# Patient Record
Sex: FEMALE | Race: WHITE | NOT HISPANIC OR LATINO | Employment: FULL TIME | URBAN - METROPOLITAN AREA
[De-identification: names, ages, dates, MRNs, and addresses within clinical notes are randomized per-mention and may not be internally consistent; named-entity substitution may affect disease eponyms.]

---

## 2017-03-10 ENCOUNTER — HOSPITAL ENCOUNTER (EMERGENCY)
Facility: MEDICAL CENTER | Age: 22
End: 2017-03-10
Attending: EMERGENCY MEDICINE
Payer: COMMERCIAL

## 2017-03-10 ENCOUNTER — APPOINTMENT (OUTPATIENT)
Dept: RADIOLOGY | Facility: MEDICAL CENTER | Age: 22
End: 2017-03-10
Attending: EMERGENCY MEDICINE
Payer: COMMERCIAL

## 2017-03-10 VITALS
SYSTOLIC BLOOD PRESSURE: 142 MMHG | DIASTOLIC BLOOD PRESSURE: 71 MMHG | HEART RATE: 120 BPM | BODY MASS INDEX: 30.33 KG/M2 | WEIGHT: 204.81 LBS | HEIGHT: 69 IN | RESPIRATION RATE: 20 BRPM | OXYGEN SATURATION: 96 % | TEMPERATURE: 98.7 F

## 2017-03-10 DIAGNOSIS — E05.90 HYPERTHYROIDISM: ICD-10-CM

## 2017-03-10 DIAGNOSIS — Z3A.01 6 WEEKS GESTATION OF PREGNANCY: ICD-10-CM

## 2017-03-10 DIAGNOSIS — N93.9 VAGINAL BLEEDING: ICD-10-CM

## 2017-03-10 LAB
APPEARANCE UR: ABNORMAL
B-HCG SERPL-ACNC: ABNORMAL MIU/ML (ref 0–10)
BACTERIA GENITAL QL WET PREP: NORMAL
BASOPHILS # BLD AUTO: 0.3 % (ref 0–1.8)
BASOPHILS # BLD: 0.02 K/UL (ref 0–0.12)
COLOR UR: ABNORMAL
EOSINOPHIL # BLD AUTO: 0.06 K/UL (ref 0–0.51)
EOSINOPHIL NFR BLD: 0.9 % (ref 0–6.9)
ERYTHROCYTE [DISTWIDTH] IN BLOOD BY AUTOMATED COUNT: 38.5 FL (ref 35.9–50)
GLUCOSE UR STRIP.AUTO-MCNC: NEGATIVE MG/DL
HCG UR QL: POSITIVE
HCT VFR BLD AUTO: 40.7 % (ref 37–47)
HGB BLD-MCNC: 13.4 G/DL (ref 12–16)
IMM GRANULOCYTES # BLD AUTO: 0.02 K/UL (ref 0–0.11)
IMM GRANULOCYTES NFR BLD AUTO: 0.3 % (ref 0–0.9)
KETONES UR STRIP.AUTO-MCNC: NEGATIVE MG/DL
LEUKOCYTE ESTERASE UR QL STRIP.AUTO: ABNORMAL
LYMPHOCYTES # BLD AUTO: 2.54 K/UL (ref 1–4.8)
LYMPHOCYTES NFR BLD: 37.6 % (ref 22–41)
MCH RBC QN AUTO: 25.6 PG (ref 27–33)
MCHC RBC AUTO-ENTMCNC: 32.9 G/DL (ref 33.6–35)
MCV RBC AUTO: 77.7 FL (ref 81.4–97.8)
MONOCYTES # BLD AUTO: 0.81 K/UL (ref 0–0.85)
MONOCYTES NFR BLD AUTO: 12 % (ref 0–13.4)
NEUTROPHILS # BLD AUTO: 3.3 K/UL (ref 2–7.15)
NEUTROPHILS NFR BLD: 48.9 % (ref 44–72)
NITRITE UR QL STRIP.AUTO: NEGATIVE
NRBC # BLD AUTO: 0 K/UL
NRBC BLD AUTO-RTO: 0 /100 WBC
NUMBER OF RH DOSES IND 8505RD: NORMAL
PH UR STRIP.AUTO: 5.5 [PH]
PLATELET # BLD AUTO: 186 K/UL (ref 164–446)
PMV BLD AUTO: 10.7 FL (ref 9–12.9)
PROT UR QL STRIP: NEGATIVE MG/DL
RBC # BLD AUTO: 5.24 M/UL (ref 4.2–5.4)
RBC UR QL AUTO: ABNORMAL
RH BLD: NORMAL
SIGNIFICANT IND 70042: NORMAL
SITE SITE: NORMAL
SOURCE SOURCE: NORMAL
SP GR UR STRIP.AUTO: 1.01
T4 FREE SERPL-MCNC: 2.83 NG/DL (ref 0.58–1.64)
TSH SERPL DL<=0.005 MIU/L-ACNC: <0.015 UIU/ML (ref 0.35–5.5)
WBC # BLD AUTO: 6.8 K/UL (ref 4.8–10.8)

## 2017-03-10 PROCEDURE — 87491 CHLMYD TRACH DNA AMP PROBE: CPT

## 2017-03-10 PROCEDURE — 86901 BLOOD TYPING SEROLOGIC RH(D): CPT

## 2017-03-10 PROCEDURE — 84702 CHORIONIC GONADOTROPIN TEST: CPT

## 2017-03-10 PROCEDURE — 85025 COMPLETE CBC W/AUTO DIFF WBC: CPT

## 2017-03-10 PROCEDURE — 76817 TRANSVAGINAL US OBSTETRIC: CPT

## 2017-03-10 PROCEDURE — 87591 N.GONORRHOEAE DNA AMP PROB: CPT

## 2017-03-10 PROCEDURE — 84439 ASSAY OF FREE THYROXINE: CPT

## 2017-03-10 PROCEDURE — 36415 COLL VENOUS BLD VENIPUNCTURE: CPT

## 2017-03-10 PROCEDURE — 81025 URINE PREGNANCY TEST: CPT

## 2017-03-10 PROCEDURE — 99284 EMERGENCY DEPT VISIT MOD MDM: CPT

## 2017-03-10 PROCEDURE — 81002 URINALYSIS NONAUTO W/O SCOPE: CPT

## 2017-03-10 PROCEDURE — 84443 ASSAY THYROID STIM HORMONE: CPT

## 2017-03-10 RX ORDER — PROPYLTHIOURACIL 50 MG/1
50 TABLET ORAL 3 TIMES DAILY
Qty: 90 TAB | Refills: 1 | Status: SHIPPED | OUTPATIENT
Start: 2017-03-10 | End: 2018-02-12

## 2017-03-10 RX ORDER — LORATADINE 10 MG/1
10 TABLET ORAL DAILY
Status: SHIPPED | COMMUNITY
End: 2018-02-12

## 2017-03-10 ASSESSMENT — PAIN SCALES - GENERAL: PAINLEVEL_OUTOF10: ASSUMED PAIN PRESENT

## 2017-03-10 NOTE — ED NOTES
The Medication Reconciliation process has been completed by interviewing the patient    Allergies have been reviewed  Antibiotic use in 30 days - NONE    Home Pharmacy:  [facility] [place]

## 2017-03-10 NOTE — ED AVS SNAPSHOT
deskwolf Access Code: [email or web address]  Expires: 4/9/2017  3:34 PM    deskwolf  A secure, online tool to manage your health information     [employer]s deskwolf® is a secure, online tool that connects you to your personalized health information from the privacy of your home -- day or night - making it very easy for you to manage your healthcare. Once the activation process is completed, you can even access your medical information using the deskwolf karla, which is available for free in the Apple Karla store or Google Play store.     deskwolf provides the following levels of access (as shown below):   My Chart Features   [facility] Primary Care Doctor [facility]  Specialists [facility]  Urgent  Care Non-[facility]  Primary Care  Doctor   Email your healthcare team securely and privately 24/7 X X X X   Manage appointments: schedule your next appointment; view details of past/upcoming appointments X      Request prescription refills. X      View recent personal medical records, including lab and immunizations X X X X   View health record, including health history, allergies, medications X X X X   Read reports about your outpatient visits, procedures, consult and ER notes X X X X   See your discharge summary, which is a recap of your hospital and/or ER visit that includes your diagnosis, lab results, and care plan. X X       How to register for deskwolf:  1. Go to  [email or web address].  2. Click on the Sign Up Now box, which takes you to the New Member Sign Up page. You will need to provide the following information:  a. Enter your deskwolf Access Code exactly as it appears at the top of this page. (You will not need to use this code after you’ve completed the sign-up process. If you do not sign up before the expiration date, you must request a new code.)   b. Enter your date of birth.   c. Enter your home email address.   d. Click Submit, and follow the next screen’s instructions.  3. Create a deskwolf ID. This will be your deskwolf  login ID and cannot be changed, so think of one that is secure and easy to remember.  4. Create a [employer] password. You can change your password at any time.  5. Enter your Password Reset Question and Answer. This can be used at a later time if you forget your password.   6. Enter your e-mail address. This allows you to receive e-mail notifications when new information is available in [employer].  7. Click Sign Up. You can now view your health information.    For assistance activating your [employer] account, call [phone]

## 2017-03-10 NOTE — DISCHARGE INSTRUCTIONS
Take PTU as prescribed for your thyroid  Go to [facility] on [address] for continued vaginal bleeding, pain, or other concerns  Follow-up with Dr. Vale who was on-call for us for family practice and can refer you to an endocrinologist  Follow-up with GYN as scheduled on March 23      Hyperthyroidism  Hyperthyroidism is when the thyroid is too active (overactive). Your thyroid is a large gland that is located in your neck. The thyroid helps to control how your body uses food (metabolism). When your thyroid is overactive, it produces too much of a hormone called thyroxine.   CAUSES  Causes of hyperthyroidism may include:  · Graves disease. This is when your immune system attacks the thyroid gland. This is the most common cause.  · Inflammation of the thyroid gland.  · Tumor in the thyroid gland or somewhere else.  · Excessive use of thyroid medicines, including:  · Prescription thyroid supplement.  · Herbal supplements that mimic thyroid hormones.  · Solid or fluid-filled lumps within your thyroid gland (thyroid nodules).  · Excessive ingestion of iodine.  RISK FACTORS  · Being female.  · Having a family history of thyroid conditions.  SIGNS AND SYMPTOMS  Signs and symptoms of hyperthyroidism may include:  · Nervousness.  · Inability to tolerate heat.  · Unexplained weight loss.  · Diarrhea.  · Change in the texture of hair or skin.  · Heart skipping beats or making extra beats.  · Rapid heart rate.  · Loss of menstruation.  · Shaky hands.  · Fatigue.  · Restlessness.  · Increased appetite.  · Sleep problems.  · Enlarged thyroid gland or nodules.  DIAGNOSIS   Diagnosis of hyperthyroidism may include:  · Medical history and physical exam.  · Blood tests.  · Ultrasound tests.  TREATMENT  Treatment may include:  · Medicines to control your thyroid.  · Surgery to remove your thyroid.  · Radiation therapy.  HOME CARE INSTRUCTIONS   · Take medicines only as directed by your health care provider.  · Do  not use any tobacco products, including cigarettes, chewing tobacco, or electronic cigarettes. If you need help quitting, ask your health care provider.  · Do not exercise or do physical activity until your health care provider approves.  · Keep all follow-up appointments as directed by your health care provider. This is important.  SEEK MEDICAL CARE IF:  · Your symptoms do not get better with treatment.  · You have fever.  · You are taking thyroid replacement medicine and you:  ¨ Have depression.  ¨ Feel mentally and physically slow.  ¨ Have weight gain.  SEEK IMMEDIATE MEDICAL CARE IF:   · You have decreased alertness or a change in your awareness.  · You have abdominal pain.  · You feel dizzy.  · You have a rapid heartbeat.  · You have an irregular heartbeat.     This information is not intended to replace advice given to you by your health care provider. Make sure you discuss any questions you have with your health care provider.     Document Released: 12/18/2006 Document Revised: 01/08/2016 Document Reviewed: 05/05/2015  [employer] Interactive Patient Education ©2016 [employer]      Vaginal Bleeding During Pregnancy, First Trimester  A small amount of bleeding (spotting) from the vagina is relatively common in early pregnancy. It usually stops on its own. Various things may cause bleeding or spotting in early pregnancy. Some bleeding may be related to the pregnancy, and some may not. In most cases, the bleeding is normal and is not a problem. However, bleeding can also be a sign of something serious. Be sure to tell your health care provider about any vaginal bleeding right away.  Some possible causes of vaginal bleeding during the first trimester include:  · Infection or inflammation of the cervix.  · Growths (polyps) on the cervix.  · Miscarriage or threatened miscarriage.  · Pregnancy tissue has developed outside of the uterus and in a fallopian tube (tubal pregnancy).  · Tiny cysts have developed in the  uterus instead of pregnancy tissue (molar pregnancy).  HOME CARE INSTRUCTIONS   Watch your condition for any changes. The following actions may help to lessen any discomfort you are feeling:  · Follow your health care provider's instructions for limiting your activity. If your health care provider orders bed rest, you may need to stay in bed and only get up to use the bathroom. However, your health care provider may allow you to continue light activity.  · If needed, make plans for someone to help with your regular activities and responsibilities while you are on bed rest.  · Keep track of the number of pads you use each day, how often you change pads, and how soaked (saturated) they are. Write this down.  · Do not use tampons. Do not douche.  · Do not have sexual intercourse or orgasms until approved by your health care provider.  · If you pass any tissue from your vagina, save the tissue so you can show it to your health care provider.  · Only take over-the-counter or prescription medicines as directed by your health care provider.  · Do not take aspirin because it can make you bleed.  · Keep all follow-up appointments as directed by your health care provider.  SEEK MEDICAL CARE IF:  · You have any vaginal bleeding during any part of your pregnancy.  · You have cramps or labor pains.  · You have a fever, not controlled by medicine.  SEEK IMMEDIATE MEDICAL CARE IF:   · You have severe cramps in your back or belly (abdomen).  · You pass large clots or tissue from your vagina.  · Your bleeding increases.  · You feel light-headed or weak, or you have fainting episodes.  · You have chills.  · You are leaking fluid or have a gush of fluid from your vagina.  · You pass out while having a bowel movement.  MAKE SURE YOU:  · Understand these instructions.  · Will watch your condition.  · Will get help right away if you are not doing well or get worse.     This information is not intended to replace advice given to you by  your health care provider. Make sure you discuss any questions you have with your health care provider.     Document Released: 09/27/2006 Document Revised: 12/23/2014 Document Reviewed: 08/25/2014  Elsevier Interactive Patient Education ©2016 Elsevier Inc.

## 2017-03-10 NOTE — ED AVS SNAPSHOT
Home Care Instructions                                                                                                                Damari Hansen   MRN: [id number]    Department:  [facility], Emergency Dept   Date of Visit:  3/10/2017            [facility], Emergency Dept    [address]    [place] [address]    Phone:  [phone]      You were seen by     Pattie Palmer M.D.      Your Diagnosis Was     Vaginal bleeding     N93.9       Follow-up Information     1. Follow up with [facility].    Why:  As needed, If symptoms worsen    Contact information    [address]  [place] [phone]        2. Follow up with Cesario Vale M.D.. Schedule an appointment as soon as possible for a visit in 3 days.    Specialty:  Family Medicine    Contact information    [address]  [facility] [address] [phone]          3. Follow up with Leslie Hughes M.D..    Specialty:  OB/Gyn    Why:  see GYN as scheduled on 3/23 or sooner if possible    Contact information    [address]  [place] [address] [phone]          4. Follow up with Sanjana Garrett M.D.. Schedule an appointment as soon as possible for a visit in 3 days.    Specialty:  Endocrinology    Contact information    [address]  [place] [address] [phone]        Medication Information     Review all of your home medications and newly ordered medications with your primary doctor and/or pharmacist as soon as possible. Follow medication instructions as directed by your doctor and/or pharmacist.     Please keep your complete medication list with you and share with your physician. Update the information when medications are discontinued, doses are changed, or new medications (including over-the-counter products) are added; and carry medication information at all times in the event of emergency situations.               Medication List         START taking these medications        Instructions    Morning Afternoon Evening Bedtime    propylthiouracil 50 MG Tabs   Commonly known as:  PTU        Take 1 Tab by mouth 3 times a day.   Dose:  50 mg                          ASK your doctor about these medications        Instructions    Morning Afternoon Evening Bedtime    loratadine 10 MG Tabs   Commonly known as:  CLARITIN        Take 10 mg by mouth every day.   Dose:  10 mg                             Where to Get Your Medications      You can get these medications from any pharmacy     Bring a paper prescription for each of these medications    - propylthiouracil 50 MG Tabs            Procedures and tests performed during your visit     CBC WITH DIFFERENTIAL    CHLAMYDIA & GC BY PCR    FREE THYROXINE    HCG QUANTITATIVE SERUM    IV Saline Lock    POC UA    POC URINE PREGNANCY    RH TYPE FOR RHOGAM FROM E.D.    Set Up for Pelvic Exam    TSH    US-OB PELVIS TRANSVAGINAL    WET PREP        Discharge Instructions       Take PTU as prescribed for your thyroid  Go to [facility] on [address] for continued vaginal bleeding, pain, or other concerns  Follow-up with Dr. Vale who was on-call for us for family practice and can refer you to an endocrinologist  Follow-up with GYN as scheduled on March 23      Hyperthyroidism  Hyperthyroidism is when the thyroid is too active (overactive). Your thyroid is a large gland that is located in your neck. The thyroid helps to control how your body uses food (metabolism). When your thyroid is overactive, it produces too much of a hormone called thyroxine.   CAUSES  Causes of hyperthyroidism may include:  · Graves disease. This is when your immune system attacks the thyroid gland. This is the most common cause.  · Inflammation of the thyroid gland.  · Tumor in the thyroid gland or somewhere else.  · Excessive use of thyroid medicines, including:  · Prescription thyroid supplement.  · Herbal supplements that mimic  thyroid hormones.  · Solid or fluid-filled lumps within your thyroid gland (thyroid nodules).  · Excessive ingestion of iodine.  RISK FACTORS  · Being female.  · Having a family history of thyroid conditions.  SIGNS AND SYMPTOMS  Signs and symptoms of hyperthyroidism may include:  · Nervousness.  · Inability to tolerate heat.  · Unexplained weight loss.  · Diarrhea.  · Change in the texture of hair or skin.  · Heart skipping beats or making extra beats.  · Rapid heart rate.  · Loss of menstruation.  · Shaky hands.  · Fatigue.  · Restlessness.  · Increased appetite.  · Sleep problems.  · Enlarged thyroid gland or nodules.  DIAGNOSIS   Diagnosis of hyperthyroidism may include:  · Medical history and physical exam.  · Blood tests.  · Ultrasound tests.  TREATMENT  Treatment may include:  · Medicines to control your thyroid.  · Surgery to remove your thyroid.  · Radiation therapy.  HOME CARE INSTRUCTIONS   · Take medicines only as directed by your health care provider.  · Do not use any tobacco products, including cigarettes, chewing tobacco, or electronic cigarettes. If you need help quitting, ask your health care provider.  · Do not exercise or do physical activity until your health care provider approves.  · Keep all follow-up appointments as directed by your health care provider. This is important.  SEEK MEDICAL CARE IF:  · Your symptoms do not get better with treatment.  · You have fever.  · You are taking thyroid replacement medicine and you:  ¨ Have depression.  ¨ Feel mentally and physically slow.  ¨ Have weight gain.  SEEK IMMEDIATE MEDICAL CARE IF:   · You have decreased alertness or a change in your awareness.  · You have abdominal pain.  · You feel dizzy.  · You have a rapid heartbeat.  · You have an irregular heartbeat.     This information is not intended to replace advice given to you by your health care provider. Make sure you discuss any questions you have with your health care provider.     Document  Released: 12/18/2006 Document Revised: 01/08/2016 Document Reviewed: 05/05/2015  [employer] Interactive Patient Education ©2016 [employer].      Vaginal Bleeding During Pregnancy, First Trimester  A small amount of bleeding (spotting) from the vagina is relatively common in early pregnancy. It usually stops on its own. Various things may cause bleeding or spotting in early pregnancy. Some bleeding may be related to the pregnancy, and some may not. In most cases, the bleeding is normal and is not a problem. However, bleeding can also be a sign of something serious. Be sure to tell your health care provider about any vaginal bleeding right away.  Some possible causes of vaginal bleeding during the first trimester include:  · Infection or inflammation of the cervix.  · Growths (polyps) on the cervix.  · Miscarriage or threatened miscarriage.  · Pregnancy tissue has developed outside of the uterus and in a fallopian tube (tubal pregnancy).  · Tiny cysts have developed in the uterus instead of pregnancy tissue (molar pregnancy).  HOME CARE INSTRUCTIONS   Watch your condition for any changes. The following actions may help to lessen any discomfort you are feeling:  · Follow your health care provider's instructions for limiting your activity. If your health care provider orders bed rest, you may need to stay in bed and only get up to use the bathroom. However, your health care provider may allow you to continue light activity.  · If needed, make plans for someone to help with your regular activities and responsibilities while you are on bed rest.  · Keep track of the number of pads you use each day, how often you change pads, and how soaked (saturated) they are. Write this down.  · Do not use tampons. Do not douche.  · Do not have sexual intercourse or orgasms until approved by your health care provider.  · If you pass any tissue from your vagina, save the tissue so you can show it to your health care provider.  · Only  take over-the-counter or prescription medicines as directed by your health care provider.  · Do not take aspirin because it can make you bleed.  · Keep all follow-up appointments as directed by your health care provider.  SEEK MEDICAL CARE IF:  · You have any vaginal bleeding during any part of your pregnancy.  · You have cramps or labor pains.  · You have a fever, not controlled by medicine.  SEEK IMMEDIATE MEDICAL CARE IF:   · You have severe cramps in your back or belly (abdomen).  · You pass large clots or tissue from your vagina.  · Your bleeding increases.  · You feel light-headed or weak, or you have fainting episodes.  · You have chills.  · You are leaking fluid or have a gush of fluid from your vagina.  · You pass out while having a bowel movement.  MAKE SURE YOU:  · Understand these instructions.  · Will watch your condition.  · Will get help right away if you are not doing well or get worse.     This information is not intended to replace advice given to you by your health care provider. Make sure you discuss any questions you have with your health care provider.     Document Released: 09/27/2006 Document Revised: 12/23/2014 Document Reviewed: 08/25/2014  [employer] Interactive Patient Education ©2016 [employer]            Patient Information     Patient Information    Following emergency treatment: all patient requiring follow-up care must return either to a private physician or a clinic if your condition worsens before you are able to obtain further medical attention, please return to the emergency room.     Billing Information    At [facility], we work to make the billing process streamlined for our patients.  Our Representatives are here to answer any questions you may have regarding your hospital bill.  If you have insurance coverage and have supplied your insurance information to us, we will submit a claim to your insurer on your behalf.  Should you have any questions regarding your bill, we can  be reached online or by phone as follows:  Online: You are able pay your bills online or live chat with our representatives about any billing questions you may have. We are here to help Monday - Friday from 8:00am to 7:30pm and 9:00am - 12:00pm on Saturdays.  Please visit [email or web address]  for more information.   Phone:  [phone] or [phone]    Please note that your emergency physician, surgeon, pathologist, radiologist, anesthesiologist, and other specialists are not employed by [facility] and will therefore bill separately for their services.  Please contact them directly for any questions concerning their bills at the numbers below:     Emergency Physician Services:  [phone]  [facility]:  [phone]  [facility]:  [phone]  [facility]:  [phone]    1. Your final bill may vary from the amount quoted upon discharge if all procedures are not complete at that time, or if your doctor has additional procedures of which we are not aware. You will receive an additional bill if you return to the Emergency Department at [facility] for suture removal regardless of the facility of which the sutures were placed.     2. Please arrange for settlement of this account at the emergency registration.    3. All self-pay accounts are due in full at the time of treatment.  If you are unable to meet this obligation then payment is expected within 4-5 days.     4. If you have had radiology studies (CT, X-ray, Ultrasound, MRI), you have received a preliminary result during your emergency department visit. Please contact the radiology department [phone] to receive a copy of your final result. Please discuss the Final result with your primary physician or with the follow up physician provided.     Crisis Hotline:  [place] Crisis Hotline:  4-644-IKLHCVL or 1-112.780.6763  [place] Crisis Hotline:    1-297.906.6155 or  358.277.3924         ED Discharge Follow Up Questions    1. In order to provide you with very good care, we would like to follow up with a phone call in the next few days.  May we have your permission to contact you?     YES /  NO    2. What is the best phone number to call you? (       )_____-__________    3. What is the best time to call you?      Morning  /  Afternoon  /  Evening                   Patient Signature:  ____________________________________________________________    Date:  ____________________________________________________________

## 2017-03-10 NOTE — ED AVS SNAPSHOT
3/10/2017          Damari Hansen  [address]  [place] [address]    Dear Damari:    [facility] wants to ensure your discharge home is safe and you or your loved ones have had all your questions answered regarding your care after you leave the hospital.    You may receive a telephone call within two days of your discharge.  This call is to make certain you understand your discharge instructions as well as ensure we provided you with the best care possible during your stay with us.     The call will only last approximately 3-5 minutes and will be done by a nurse.    Once again, we want to ensure your discharge home is safe and that you have a clear understanding of any next steps in your care.  If you have any questions or concerns, please do not hesitate to contact us, we are here for you.  Thank you for choosing [facility] for your healthcare needs.    Sincerely,    Temo Petersen    [facility]

## 2017-03-10 NOTE — ED NOTES
Vaginal bleeding x 1 week. Cannot see OBGYN until 3/23. Recently found out she is pregnant. Last menstral cycle end of January. .

## 2017-03-11 LAB
C TRACH DNA SPEC QL NAA+PROBE: NEGATIVE
N GONORRHOEA DNA SPEC QL NAA+PROBE: NEGATIVE
SPECIMEN SOURCE: NORMAL

## 2017-03-11 NOTE — ED NOTES
1535 VSS D/C instn reviewed Pt reassured D/C ambul stable improved condn with male friend Mild vag spotting and cramping reported

## 2018-02-12 ENCOUNTER — RESOLUTE PROFESSIONAL BILLING HOSPITAL PROF FEE (OUTPATIENT)
Dept: HOSPITALIST | Facility: MEDICAL CENTER | Age: 23
End: 2018-02-12
Payer: COMMERCIAL

## 2018-02-12 ENCOUNTER — APPOINTMENT (OUTPATIENT)
Dept: RADIOLOGY | Facility: MEDICAL CENTER | Age: 23
DRG: 872 | End: 2018-02-12
Attending: EMERGENCY MEDICINE
Payer: COMMERCIAL

## 2018-02-12 ENCOUNTER — HOSPITAL ENCOUNTER (INPATIENT)
Facility: MEDICAL CENTER | Age: 23
LOS: 1 days | DRG: 872 | End: 2018-02-13
Attending: EMERGENCY MEDICINE | Admitting: HOSPITALIST
Payer: COMMERCIAL

## 2018-02-12 DIAGNOSIS — A41.9 SEPSIS, DUE TO UNSPECIFIED ORGANISM: ICD-10-CM

## 2018-02-12 DIAGNOSIS — E87.1 HYPONATREMIA: ICD-10-CM

## 2018-02-12 DIAGNOSIS — J02.0 STREP PHARYNGITIS: ICD-10-CM

## 2018-02-12 LAB
ALBUMIN SERPL BCP-MCNC: 3.6 G/DL (ref 3.2–4.9)
ALBUMIN/GLOB SERPL: 1 G/DL
ALP SERPL-CCNC: 78 U/L (ref 30–99)
ALT SERPL-CCNC: 41 U/L (ref 2–50)
ANION GAP SERPL CALC-SCNC: 11 MMOL/L (ref 0–11.9)
APPEARANCE UR: ABNORMAL
AST SERPL-CCNC: 24 U/L (ref 12–45)
BACTERIA #/AREA URNS HPF: ABNORMAL /HPF
BASOPHILS # BLD AUTO: 0.2 % (ref 0–1.8)
BASOPHILS # BLD: 0.04 K/UL (ref 0–0.12)
BILIRUB SERPL-MCNC: 1 MG/DL (ref 0.1–1.5)
BILIRUB UR QL CFM: NORMAL
BUN SERPL-MCNC: 8 MG/DL (ref 8–22)
CALCIUM SERPL-MCNC: 8.8 MG/DL (ref 8.4–10.2)
CHLORIDE SERPL-SCNC: 103 MMOL/L (ref 96–112)
CO2 SERPL-SCNC: 18 MMOL/L (ref 20–33)
COLOR UR: YELLOW
CREAT SERPL-MCNC: 0.61 MG/DL (ref 0.5–1.4)
CULTURE IF INDICATED INDCX: NO UA CULTURE
EKG IMPRESSION: NORMAL
EOSINOPHIL # BLD AUTO: 0 K/UL (ref 0–0.51)
EOSINOPHIL NFR BLD: 0 % (ref 0–6.9)
EPI CELLS #/AREA URNS HPF: ABNORMAL /HPF
ERYTHROCYTE [DISTWIDTH] IN BLOOD BY AUTOMATED COUNT: 34.3 FL (ref 35.9–50)
FLUAV+FLUBV AG SPEC QL IA: NORMAL
GLOBULIN SER CALC-MCNC: 3.5 G/DL (ref 1.9–3.5)
GLUCOSE SERPL-MCNC: 109 MG/DL (ref 65–99)
GLUCOSE UR STRIP.AUTO-MCNC: NEGATIVE MG/DL
HCG UR QL: NEGATIVE
HCT VFR BLD AUTO: 42.2 % (ref 37–47)
HGB BLD-MCNC: 14.2 G/DL (ref 12–16)
IMM GRANULOCYTES # BLD AUTO: 0.04 K/UL (ref 0–0.11)
IMM GRANULOCYTES NFR BLD AUTO: 0.2 % (ref 0–0.9)
KETONES UR STRIP.AUTO-MCNC: 40 MG/DL
LACTATE BLD-SCNC: 1.57 MMOL/L (ref 0.5–2)
LEUKOCYTE ESTERASE UR QL STRIP.AUTO: NEGATIVE
LYMPHOCYTES # BLD AUTO: 2.62 K/UL (ref 1–4.8)
LYMPHOCYTES NFR BLD: 16 % (ref 22–41)
MCH RBC QN AUTO: 24.8 PG (ref 27–33)
MCHC RBC AUTO-ENTMCNC: 33.6 G/DL (ref 33.6–35)
MCV RBC AUTO: 73.6 FL (ref 81.4–97.8)
MICRO URNS: ABNORMAL
MONOCYTES # BLD AUTO: 1.33 K/UL (ref 0–0.85)
MONOCYTES NFR BLD AUTO: 8.1 % (ref 0–13.4)
MUCOUS THREADS #/AREA URNS HPF: ABNORMAL /HPF
NEUTROPHILS # BLD AUTO: 12.33 K/UL (ref 2–7.15)
NEUTROPHILS NFR BLD: 75.5 % (ref 44–72)
NITRITE UR QL STRIP.AUTO: NEGATIVE
NRBC # BLD AUTO: 0 K/UL
NRBC BLD-RTO: 0 /100 WBC
PH UR STRIP.AUTO: 6 [PH]
PLATELET # BLD AUTO: 186 K/UL (ref 164–446)
PMV BLD AUTO: 10.9 FL (ref 9–12.9)
POTASSIUM SERPL-SCNC: 3.4 MMOL/L (ref 3.6–5.5)
PROT SERPL-MCNC: 7.1 G/DL (ref 6–8.2)
PROT UR QL STRIP: 100 MG/DL
RBC # BLD AUTO: 5.73 M/UL (ref 4.2–5.4)
RBC # URNS HPF: ABNORMAL /HPF
RBC UR QL AUTO: ABNORMAL
S PYO AG THROAT QL: ABNORMAL
SIGNIFICANT IND 70042: ABNORMAL
SIGNIFICANT IND 70042: NORMAL
SITE SITE: ABNORMAL
SITE SITE: NORMAL
SODIUM SERPL-SCNC: 132 MMOL/L (ref 135–145)
SOURCE SOURCE: ABNORMAL
SOURCE SOURCE: NORMAL
SP GR UR REFRACTOMETRY: 1.02
SP GR UR STRIP.AUTO: 1.02
T4 FREE SERPL-MCNC: >6 NG/DL (ref 0.58–1.64)
TSH SERPL DL<=0.005 MIU/L-ACNC: <0.005 UIU/ML (ref 0.38–5.33)
UNIDENT CRYS URNS QL MICRO: ABNORMAL /HPF
WBC # BLD AUTO: 16.4 K/UL (ref 4.8–10.8)
WBC #/AREA URNS HPF: ABNORMAL /HPF

## 2018-02-12 PROCEDURE — 700105 HCHG RX REV CODE 258: Performed by: EMERGENCY MEDICINE

## 2018-02-12 PROCEDURE — 93005 ELECTROCARDIOGRAM TRACING: CPT | Performed by: EMERGENCY MEDICINE

## 2018-02-12 PROCEDURE — 84439 ASSAY OF FREE THYROXINE: CPT

## 2018-02-12 PROCEDURE — 80053 COMPREHEN METABOLIC PANEL: CPT

## 2018-02-12 PROCEDURE — A9270 NON-COVERED ITEM OR SERVICE: HCPCS | Performed by: EMERGENCY MEDICINE

## 2018-02-12 PROCEDURE — 81001 URINALYSIS AUTO W/SCOPE: CPT

## 2018-02-12 PROCEDURE — 87880 STREP A ASSAY W/OPTIC: CPT

## 2018-02-12 PROCEDURE — 83036 HEMOGLOBIN GLYCOSYLATED A1C: CPT

## 2018-02-12 PROCEDURE — 87502 INFLUENZA DNA AMP PROBE: CPT

## 2018-02-12 PROCEDURE — 84443 ASSAY THYROID STIM HORMONE: CPT

## 2018-02-12 PROCEDURE — 96361 HYDRATE IV INFUSION ADD-ON: CPT

## 2018-02-12 PROCEDURE — 87040 BLOOD CULTURE FOR BACTERIA: CPT | Mod: 91

## 2018-02-12 PROCEDURE — 99285 EMERGENCY DEPT VISIT HI MDM: CPT

## 2018-02-12 PROCEDURE — 96360 HYDRATION IV INFUSION INIT: CPT

## 2018-02-12 PROCEDURE — 83605 ASSAY OF LACTIC ACID: CPT

## 2018-02-12 PROCEDURE — 71275 CT ANGIOGRAPHY CHEST: CPT

## 2018-02-12 PROCEDURE — 99221 1ST HOSP IP/OBS SF/LOW 40: CPT | Performed by: HOSPITALIST

## 2018-02-12 PROCEDURE — 770001 HCHG ROOM/CARE - MED/SURG/GYN PRIV*

## 2018-02-12 PROCEDURE — 36415 COLL VENOUS BLD VENIPUNCTURE: CPT

## 2018-02-12 PROCEDURE — 87400 INFLUENZA A/B EACH AG IA: CPT

## 2018-02-12 PROCEDURE — 700102 HCHG RX REV CODE 250 W/ 637 OVERRIDE(OP): Performed by: EMERGENCY MEDICINE

## 2018-02-12 PROCEDURE — 81025 URINE PREGNANCY TEST: CPT

## 2018-02-12 PROCEDURE — 85025 COMPLETE CBC W/AUTO DIFF WBC: CPT

## 2018-02-12 PROCEDURE — 700117 HCHG RX CONTRAST REV CODE 255: Performed by: EMERGENCY MEDICINE

## 2018-02-12 RX ORDER — SODIUM CHLORIDE 9 MG/ML
INJECTION, SOLUTION INTRAVENOUS CONTINUOUS
Status: DISCONTINUED | OUTPATIENT
Start: 2018-02-12 | End: 2018-02-13 | Stop reason: HOSPADM

## 2018-02-12 RX ORDER — POLYETHYLENE GLYCOL 3350 17 G/17G
1 POWDER, FOR SOLUTION ORAL
Status: DISCONTINUED | OUTPATIENT
Start: 2018-02-12 | End: 2018-02-13 | Stop reason: HOSPADM

## 2018-02-12 RX ORDER — ZOLPIDEM TARTRATE 5 MG/1
5 TABLET ORAL
Status: DISCONTINUED | OUTPATIENT
Start: 2018-02-12 | End: 2018-02-13 | Stop reason: HOSPADM

## 2018-02-12 RX ORDER — AMOXICILLIN 250 MG
2 CAPSULE ORAL 2 TIMES DAILY
Status: DISCONTINUED | OUTPATIENT
Start: 2018-02-12 | End: 2018-02-13 | Stop reason: HOSPADM

## 2018-02-12 RX ORDER — ONDANSETRON 4 MG/1
4 TABLET, ORALLY DISINTEGRATING ORAL EVERY 4 HOURS PRN
Status: DISCONTINUED | OUTPATIENT
Start: 2018-02-12 | End: 2018-02-13 | Stop reason: HOSPADM

## 2018-02-12 RX ORDER — SODIUM CHLORIDE 9 MG/ML
1000 INJECTION, SOLUTION INTRAVENOUS ONCE
Status: COMPLETED | OUTPATIENT
Start: 2018-02-12 | End: 2018-02-12

## 2018-02-12 RX ORDER — ACETAMINOPHEN 325 MG/1
975 TABLET ORAL ONCE
Status: COMPLETED | OUTPATIENT
Start: 2018-02-12 | End: 2018-02-12

## 2018-02-12 RX ORDER — CLONIDINE HYDROCHLORIDE 0.1 MG/1
0.1 TABLET ORAL EVERY 6 HOURS PRN
Status: DISCONTINUED | OUTPATIENT
Start: 2018-02-12 | End: 2018-02-13 | Stop reason: HOSPADM

## 2018-02-12 RX ORDER — AZITHROMYCIN 250 MG/1
500 TABLET, FILM COATED ORAL ONCE
Status: COMPLETED | OUTPATIENT
Start: 2018-02-12 | End: 2018-02-12

## 2018-02-12 RX ORDER — PROMETHAZINE HYDROCHLORIDE 25 MG/1
12.5-25 TABLET ORAL EVERY 4 HOURS PRN
Status: DISCONTINUED | OUTPATIENT
Start: 2018-02-12 | End: 2018-02-13 | Stop reason: HOSPADM

## 2018-02-12 RX ORDER — ONDANSETRON 2 MG/ML
4 INJECTION INTRAMUSCULAR; INTRAVENOUS EVERY 4 HOURS PRN
Status: DISCONTINUED | OUTPATIENT
Start: 2018-02-12 | End: 2018-02-13 | Stop reason: HOSPADM

## 2018-02-12 RX ORDER — PROMETHAZINE HYDROCHLORIDE 25 MG/1
12.5-25 SUPPOSITORY RECTAL EVERY 4 HOURS PRN
Status: DISCONTINUED | OUTPATIENT
Start: 2018-02-12 | End: 2018-02-13 | Stop reason: HOSPADM

## 2018-02-12 RX ORDER — ACETAMINOPHEN 325 MG/1
650 TABLET ORAL EVERY 6 HOURS PRN
Status: DISCONTINUED | OUTPATIENT
Start: 2018-02-12 | End: 2018-02-13 | Stop reason: HOSPADM

## 2018-02-12 RX ORDER — BISACODYL 10 MG
10 SUPPOSITORY, RECTAL RECTAL
Status: DISCONTINUED | OUTPATIENT
Start: 2018-02-12 | End: 2018-02-13 | Stop reason: HOSPADM

## 2018-02-12 RX ORDER — METHIMAZOLE 10 MG/1
10 TABLET ORAL 2 TIMES DAILY
COMMUNITY
End: 2020-08-11

## 2018-02-12 RX ORDER — SODIUM CHLORIDE 9 MG/ML
500 INJECTION, SOLUTION INTRAVENOUS
Status: DISCONTINUED | OUTPATIENT
Start: 2018-02-12 | End: 2018-02-13 | Stop reason: HOSPADM

## 2018-02-12 RX ORDER — CLINDAMYCIN PHOSPHATE 600 MG/50ML
600 INJECTION, SOLUTION INTRAVENOUS EVERY 8 HOURS
Status: DISCONTINUED | OUTPATIENT
Start: 2018-02-12 | End: 2018-02-13 | Stop reason: HOSPADM

## 2018-02-12 RX ADMIN — IOHEXOL 80 ML: 350 INJECTION, SOLUTION INTRAVENOUS at 23:18

## 2018-02-12 RX ADMIN — SODIUM CHLORIDE 1000 ML: 9 INJECTION, SOLUTION INTRAVENOUS at 20:33

## 2018-02-12 RX ADMIN — ACETAMINOPHEN 975 MG: 325 TABLET, FILM COATED ORAL at 21:39

## 2018-02-12 RX ADMIN — SODIUM CHLORIDE 1000 ML: 9 INJECTION, SOLUTION INTRAVENOUS at 21:39

## 2018-02-12 RX ADMIN — AZITHROMYCIN 500 MG: 250 TABLET, FILM COATED ORAL at 21:27

## 2018-02-12 ASSESSMENT — PAIN SCALES - GENERAL: PAINLEVEL_OUTOF10: 2

## 2018-02-13 VITALS
OXYGEN SATURATION: 97 % | DIASTOLIC BLOOD PRESSURE: 64 MMHG | BODY MASS INDEX: 33.62 KG/M2 | WEIGHT: 227 LBS | SYSTOLIC BLOOD PRESSURE: 137 MMHG | TEMPERATURE: 97.8 F | RESPIRATION RATE: 20 BRPM | HEART RATE: 103 BPM | HEIGHT: 69 IN

## 2018-02-13 PROBLEM — A41.9 SEPSIS (HCC): Status: RESOLVED | Noted: 2018-02-12 | Resolved: 2018-02-13

## 2018-02-13 PROBLEM — E05.90 HYPERTHYROIDISM: Status: ACTIVE | Noted: 2018-02-13

## 2018-02-13 PROBLEM — E66.9 OBESITY: Status: ACTIVE | Noted: 2018-02-13

## 2018-02-13 LAB
ANION GAP SERPL CALC-SCNC: 10 MMOL/L (ref 0–11.9)
BASOPHILS # BLD AUTO: 0.4 % (ref 0–1.8)
BASOPHILS # BLD: 0.04 K/UL (ref 0–0.12)
BUN SERPL-MCNC: 8 MG/DL (ref 8–22)
CALCIUM SERPL-MCNC: 8 MG/DL (ref 8.4–10.2)
CHLORIDE SERPL-SCNC: 108 MMOL/L (ref 96–112)
CO2 SERPL-SCNC: 20 MMOL/L (ref 20–33)
CREAT SERPL-MCNC: 0.51 MG/DL (ref 0.5–1.4)
EOSINOPHIL # BLD AUTO: 0.03 K/UL (ref 0–0.51)
EOSINOPHIL NFR BLD: 0.3 % (ref 0–6.9)
ERYTHROCYTE [DISTWIDTH] IN BLOOD BY AUTOMATED COUNT: 37.2 FL (ref 35.9–50)
EST. AVERAGE GLUCOSE BLD GHB EST-MCNC: 97 MG/DL
FLUAV RNA SPEC QL NAA+PROBE: NEGATIVE
FLUBV RNA SPEC QL NAA+PROBE: NEGATIVE
GLUCOSE SERPL-MCNC: 97 MG/DL (ref 65–99)
HBA1C MFR BLD: 5 % (ref 0–5.6)
HCT VFR BLD AUTO: 37.8 % (ref 37–47)
HGB BLD-MCNC: 12 G/DL (ref 12–16)
IMM GRANULOCYTES # BLD AUTO: 0.04 K/UL (ref 0–0.11)
IMM GRANULOCYTES NFR BLD AUTO: 0.4 % (ref 0–0.9)
LACTATE BLD-SCNC: 0.97 MMOL/L (ref 0.5–2)
LYMPHOCYTES # BLD AUTO: 3.2 K/UL (ref 1–4.8)
LYMPHOCYTES NFR BLD: 32.2 % (ref 22–41)
MCH RBC QN AUTO: 24.6 PG (ref 27–33)
MCHC RBC AUTO-ENTMCNC: 31.7 G/DL (ref 33.6–35)
MCV RBC AUTO: 77.6 FL (ref 81.4–97.8)
MONOCYTES # BLD AUTO: 1.32 K/UL (ref 0–0.85)
MONOCYTES NFR BLD AUTO: 13.3 % (ref 0–13.4)
NEUTROPHILS # BLD AUTO: 5.3 K/UL (ref 2–7.15)
NEUTROPHILS NFR BLD: 53.4 % (ref 44–72)
NRBC # BLD AUTO: 0 K/UL
NRBC BLD-RTO: 0 /100 WBC
PLATELET # BLD AUTO: 151 K/UL (ref 164–446)
PMV BLD AUTO: 11.5 FL (ref 9–12.9)
POTASSIUM SERPL-SCNC: 3.4 MMOL/L (ref 3.6–5.5)
RBC # BLD AUTO: 4.87 M/UL (ref 4.2–5.4)
SODIUM SERPL-SCNC: 138 MMOL/L (ref 135–145)
WBC # BLD AUTO: 9.9 K/UL (ref 4.8–10.8)

## 2018-02-13 PROCEDURE — 700102 HCHG RX REV CODE 250 W/ 637 OVERRIDE(OP): Performed by: HOSPITALIST

## 2018-02-13 PROCEDURE — 83605 ASSAY OF LACTIC ACID: CPT

## 2018-02-13 PROCEDURE — 85025 COMPLETE CBC W/AUTO DIFF WBC: CPT

## 2018-02-13 PROCEDURE — 700101 HCHG RX REV CODE 250

## 2018-02-13 PROCEDURE — 99239 HOSP IP/OBS DSCHRG MGMT >30: CPT | Performed by: HOSPITALIST

## 2018-02-13 PROCEDURE — 700105 HCHG RX REV CODE 258: Performed by: HOSPITALIST

## 2018-02-13 PROCEDURE — 94760 N-INVAS EAR/PLS OXIMETRY 1: CPT

## 2018-02-13 PROCEDURE — 700101 HCHG RX REV CODE 250: Performed by: HOSPITALIST

## 2018-02-13 PROCEDURE — A9270 NON-COVERED ITEM OR SERVICE: HCPCS | Performed by: HOSPITALIST

## 2018-02-13 PROCEDURE — 80048 BASIC METABOLIC PNL TOTAL CA: CPT

## 2018-02-13 RX ORDER — POTASSIUM CHLORIDE 20 MEQ/1
20 TABLET, EXTENDED RELEASE ORAL ONCE
Status: COMPLETED | OUTPATIENT
Start: 2018-02-13 | End: 2018-02-13

## 2018-02-13 RX ORDER — CLINDAMYCIN PHOSPHATE 600 MG/50ML
INJECTION, SOLUTION INTRAVENOUS
Status: COMPLETED
Start: 2018-02-13 | End: 2018-02-13

## 2018-02-13 RX ORDER — CLINDAMYCIN HYDROCHLORIDE 300 MG/1
300 CAPSULE ORAL 3 TIMES DAILY
Qty: 21 CAP | Refills: 0 | Status: SHIPPED | OUTPATIENT
Start: 2018-02-13 | End: 2020-06-10

## 2018-02-13 RX ADMIN — CLINDAMYCIN IN 5 PERCENT DEXTROSE 600 MG: 12 INJECTION, SOLUTION INTRAVENOUS at 05:10

## 2018-02-13 RX ADMIN — CLINDAMYCIN IN 5 PERCENT DEXTROSE 600 MG: 12 INJECTION, SOLUTION INTRAVENOUS at 00:55

## 2018-02-13 RX ADMIN — SODIUM CHLORIDE: 9 INJECTION, SOLUTION INTRAVENOUS at 10:48

## 2018-02-13 RX ADMIN — SODIUM CHLORIDE: 9 INJECTION, SOLUTION INTRAVENOUS at 01:00

## 2018-02-13 RX ADMIN — POTASSIUM CHLORIDE 20 MEQ: 1500 TABLET, EXTENDED RELEASE ORAL at 10:46

## 2018-02-13 RX ADMIN — ZOLPIDEM TARTRATE 5 MG: 5 TABLET ORAL at 00:55

## 2018-02-13 RX ADMIN — CLINDAMYCIN IN 5 PERCENT DEXTROSE 600 MG: 12 INJECTION, SOLUTION INTRAVENOUS at 13:11

## 2018-02-13 ASSESSMENT — ENCOUNTER SYMPTOMS
CONSTITUTIONAL NEGATIVE: 1
SENSORY CHANGE: 0
WEIGHT LOSS: 0
SORE THROAT: 1
BRUISES/BLEEDS EASILY: 0
DEPRESSION: 0
MYALGIAS: 0
WHEEZING: 0
DIZZINESS: 0
HEADACHES: 1
COUGH: 0
MUSCULOSKELETAL NEGATIVE: 1
HALLUCINATIONS: 0
WEAKNESS: 0
FEVER: 0
MEMORY LOSS: 0
CHILLS: 1
ORTHOPNEA: 0
CARDIOVASCULAR NEGATIVE: 1
EYE REDNESS: 0
BACK PAIN: 0
RESPIRATORY NEGATIVE: 1
SPEECH CHANGE: 0
EYES NEGATIVE: 1
FEVER: 1
SEIZURES: 0
CONSTIPATION: 0
HEARTBURN: 0
PHOTOPHOBIA: 0
NAUSEA: 0
PALPITATIONS: 0
DIAPHORESIS: 0
CLAUDICATION: 0
HEMOPTYSIS: 0
LOSS OF CONSCIOUSNESS: 0
SHORTNESS OF BREATH: 1
FALLS: 0
BLURRED VISION: 0
PALPITATIONS: 1
SPUTUM PRODUCTION: 0
POLYDIPSIA: 0
ABDOMINAL PAIN: 0
BLOOD IN STOOL: 0
DIARRHEA: 0
NEUROLOGICAL NEGATIVE: 1
NECK PAIN: 0
STRIDOR: 0
EYE DISCHARGE: 0
PND: 0
FOCAL WEAKNESS: 0
GASTROINTESTINAL NEGATIVE: 1
VOMITING: 0
HEADACHES: 0
FLANK PAIN: 0
TREMORS: 0
PSYCHIATRIC NEGATIVE: 1
NERVOUS/ANXIOUS: 0
CHILLS: 0
INSOMNIA: 0
SINUS PAIN: 0
DOUBLE VISION: 0
TINGLING: 0
EYE PAIN: 0

## 2018-02-13 ASSESSMENT — COPD QUESTIONNAIRES
HAVE YOU SMOKED AT LEAST 100 CIGARETTES IN YOUR ENTIRE LIFE: NO/DON'T KNOW
DO YOU EVER COUGH UP ANY MUCUS OR PHLEGM?: NO/ONLY WITH OCCASIONAL COLDS OR INFECTIONS
COPD SCREENING SCORE: 0
DURING THE PAST 4 WEEKS HOW MUCH DID YOU FEEL SHORT OF BREATH: NONE/LITTLE OF THE TIME

## 2018-02-13 ASSESSMENT — LIFESTYLE VARIABLES
EVER_SMOKED: NEVER
DO YOU DRINK ALCOHOL: NO
SUBSTANCE_ABUSE: 0
EVER_SMOKED: NEVER

## 2018-02-13 ASSESSMENT — PATIENT HEALTH QUESTIONNAIRE - PHQ9
2. FEELING DOWN, DEPRESSED, IRRITABLE, OR HOPELESS: NOT AT ALL
SUM OF ALL RESPONSES TO PHQ QUESTIONS 1-9: 0
SUM OF ALL RESPONSES TO PHQ9 QUESTIONS 1 AND 2: 0
1. LITTLE INTEREST OR PLEASURE IN DOING THINGS: NOT AT ALL

## 2018-02-13 ASSESSMENT — PAIN SCALES - GENERAL: PAINLEVEL_OUTOF10: 0

## 2018-02-13 NOTE — ED NOTES
IV started, labs drawn, flu and strep collected and all specimens sent to lab. O2 placed on pt for comfort.

## 2018-02-13 NOTE — PROGRESS NOTES
[facility]    Date of Service: 2/13/2018    Chief Complaint  22 y.o. female admitted 2/12/2018 with sepsis and strep throat    Interval Problem Update  Doing much better today, congestion has greatly improved, throat pain much improved, now able to swallow without difficulty or significant pain, denies sob, no fevers or chills    Eager to return home, states she will return to er if needed, agrees to establish with a pcp in 1-2 weeks    Consultants/Specialty    Disposition          Review of Systems   Constitutional: Negative.  Negative for chills, diaphoresis, fever, malaise/fatigue and weight loss.   HENT: Positive for congestion and sore throat. Negative for ear discharge, ear pain, hearing loss, nosebleeds, sinus pain and tinnitus.    Eyes: Negative.  Negative for blurred vision, double vision, photophobia, pain, discharge and redness.   Respiratory: Negative.  Negative for cough, hemoptysis, sputum production, wheezing and stridor.    Cardiovascular: Negative.  Negative for chest pain, palpitations, orthopnea, claudication, leg swelling and PND.   Gastrointestinal: Negative.  Negative for abdominal pain, blood in stool, constipation, diarrhea, heartburn, melena, nausea and vomiting.   Genitourinary: Negative.  Negative for dysuria, flank pain, frequency, hematuria and urgency.   Musculoskeletal: Negative.  Negative for back pain, falls, joint pain, myalgias and neck pain.   Skin: Negative.  Negative for itching and rash.   Neurological: Negative.  Negative for dizziness, tingling, tremors, sensory change, speech change, focal weakness, seizures, loss of consciousness, weakness and headaches.   Endo/Heme/Allergies: Negative.  Negative for environmental allergies and polydipsia. Does not bruise/bleed easily.   Psychiatric/Behavioral: Negative.  Negative for depression, hallucinations, memory loss, substance abuse and suicidal ideas. The patient is not nervous/anxious and does not have  insomnia.    All other systems reviewed and are negative.     Physical Exam  Laboratory/Imaging   Hemodynamics  Temp (24hrs), Av.7 °C (98.1 °F), Min:36.4 °C (97.5 °F), Max:38 °C (100.4 °F)   Temperature: 36.6 °C (97.8 °F)  Pulse  Av.6  Min: 60  Max: 145 HR 70  Blood Pressure: 116/73, NIBP: 117/63      Respiratory      Respiration: 20, Pulse Oximetry: 94 %, O2 Daily Delivery Respiratory : Room Air with O2 Available             Fluids    Intake/Output Summary (Last 24 hours) at 18 1115  Last data filed at 18 0800   Gross per 24 hour   Intake              600 ml   Output                0 ml   Net              600 ml       Nutrition  Orders Placed This Encounter   Procedures   • Diet Order     Standing Status:   Standing     Number of Occurrences:   1     Order Specific Question:   Diet:     Answer:   Regular [1]     Physical Exam   Constitutional: She is oriented to person, place, and time. She appears well-developed and well-nourished. No distress.   HENT:   Head: Normocephalic and atraumatic.   Mouth/Throat: Oropharynx is clear and moist. No oropharyngeal exudate.   Eyes: Conjunctivae and EOM are normal. Pupils are equal, round, and reactive to light. Right eye exhibits no discharge. Left eye exhibits no discharge. No scleral icterus.   Neck: Normal range of motion. Neck supple. No tracheal deviation present. No thyromegaly present.   Cardiovascular: Normal rate, normal heart sounds and intact distal pulses.  Exam reveals no gallop and no friction rub.    No murmur heard.  Pulmonary/Chest: Effort normal and breath sounds normal. No stridor. No respiratory distress. She has no wheezes. She has no rales. She exhibits no tenderness.   Abdominal: Soft. Bowel sounds are normal. She exhibits no distension and no mass. There is no tenderness. There is no rebound and no guarding.   Musculoskeletal: Normal range of motion. She exhibits no edema or tenderness.   Lymphadenopathy:     She has cervical  "adenopathy.   Neurological: She is alert and oriented to person, place, and time. She has normal reflexes. No cranial nerve deficit. She exhibits normal muscle tone. Coordination normal.   Skin: Skin is warm and dry. No rash noted. She is not diaphoretic. No erythema. No pallor.   Psychiatric: She has a normal mood and affect. Her behavior is normal. Judgment and thought content normal.   Nursing note and vitals reviewed.      Recent Labs      02/12/18 2030 02/13/18   0315   WBC  16.4*  9.9   RBC  5.73*  4.87   HEMOGLOBIN  14.2  12.0   HEMATOCRIT  42.2  37.8   MCV  73.6*  77.6*   MCH  24.8*  24.6*   MCHC  33.6  31.7*   RDW  34.3*  37.2   PLATELETCT  186  151*   MPV  10.9  11.5     Recent Labs      02/12/18 2030 02/13/18 0315   SODIUM  132*  138   POTASSIUM  3.4*  3.4*   CHLORIDE  103  108   CO2  18*  20   GLUCOSE  109*  97   BUN  8  8   CREATININE  0.61  0.51   CALCIUM  8.8  8.0*                      Assessment/Plan     * Sepsis (CMS-[facility])   Assessment & Plan    Sepsis    Resolved        Obesity   Assessment & Plan    Body mass index is 33.52 kg/m².          Hyperthyroidism   Assessment & Plan    FT4 currently elevated, TSH suppressed  Patient states she missed at least a \"few\" recent doses of methamazole  She agrees to follow up with dr Gerry riggins          Quality-Core Measures      "

## 2018-02-13 NOTE — ED NOTES
Tereza from Lab called with critical result of positive strep at 2114. Critical lab result read back to Tereza.   Dr. Charles notified of critical lab result at 2118.  Critical lab result read back by Dr. Charles.

## 2018-02-13 NOTE — PROGRESS NOTES
Tele strip at 0829 shows ST w/ HR of 113  Measurements: .14/10/.36    Tele Shift Summary:  Rhythm: SR/ST  Rate: 70's-110's  Ectopy: Per CCT [place], pt had no ectopy.    Telemetry monitoring strips faxed to primary RN. Tele strip summary only. Further assessment and monitoring to be done by primary RN.

## 2018-02-13 NOTE — ED NOTES
Pt has temp of 100.4 and c/o chills. ERP notified. Orders for tylenol received. Pt medicated as ordered.

## 2018-02-13 NOTE — H&P
Hospital Medicine History and Physical    Date of Service  2/12/2018    Chief Complaint  Chief Complaint   Patient presents with   • Flu Like Symptoms   • Shortness of Breath       History of Presenting Illness  22 y.o. female with history of hyperthyroidism on methimazole, and with young children in  was in her usual state of health until the day prior to admission, when she had the sudden onset of sore throat with associated shortness of breath, palpitations, fever.  She noted that her rate was out of control, and subsequently presented to the emergency department for further evaluation.  She reports an associated headache.  She otherwise denies vision change, chest pain, abdominal pain, nausea or vomiting, constipation.  She does report diarrhea.    Primary Care Physician  Pcp Pt States None    Consultants  none    Code Status  Full code     Review of Systems  Review of Systems   Constitutional: Positive for chills and fever.   HENT: Positive for sore throat.    Eyes: Negative for blurred vision and double vision.   Respiratory: Positive for shortness of breath. Negative for cough and sputum production.    Cardiovascular: Positive for palpitations. Negative for chest pain.   Gastrointestinal: Negative for abdominal pain, constipation, diarrhea, nausea and vomiting.   Genitourinary: Negative for dysuria.   Neurological: Positive for headaches.        Past Medical History  Past Medical History:   Diagnosis Date   • Hyperthyroidism        Surgical History  No past surgical history on file.    Medications  No current facility-administered medications on file prior to encounter.      No current outpatient prescriptions on file prior to encounter.       Family History  History reviewed. No pertinent family history.    Social History  Social History   Substance Use Topics   • Smoking status: Never Smoker   • Smokeless tobacco: Never Used   • Alcohol use No       Allergies  Allergies   Allergen Reactions   •  Amoxicillin Hives     Rxn - 2016        Physical Exam  Laboratory   Hemodynamics  Temp (24hrs), Av.8 °C (98.2 °F), Min:36.4 °C (97.5 °F), Max:38 °C (100.4 °F)   Temperature: 36.4 °C (97.6 °F)  Pulse  Av.8  Min: 104  Max: 145 Heart Rate (Monitored): (!) 117  Blood Pressure: 119/60, NIBP: 117/63      Respiratory      Respiration: 20, Pulse Oximetry: 100 %             Physical Exam   Constitutional: She is oriented to person, place, and time. She appears well-developed and well-nourished. No distress.   HENT:   Head: Normocephalic and atraumatic.   Mouth/Throat: Oropharyngeal exudate present.   Eyes: Pupils are equal, round, and reactive to light.   Neck: Normal range of motion. Neck supple.   Cardiovascular: Regular rhythm and normal heart sounds.  Exam reveals no gallop and no friction rub.    No murmur heard.  Pulmonary/Chest: Effort normal and breath sounds normal. No respiratory distress. She has no wheezes. She has no rales.   Abdominal: Soft. Bowel sounds are normal. She exhibits no distension. There is no tenderness. There is no rebound.   Musculoskeletal: Normal range of motion. She exhibits no edema.   Lymphadenopathy:     She has cervical adenopathy.   Neurological: She is alert and oriented to person, place, and time. No cranial nerve deficit.   Skin: Skin is warm and dry. She is not diaphoretic.   Psychiatric: She has a normal mood and affect.   Nursing note and vitals reviewed.      Recent Labs      18   WBC  16.4*  9.9   RBC  5.73*  4.87   HEMOGLOBIN  14.2  12.0   HEMATOCRIT  42.2  37.8   MCV  73.6*  77.6*   MCH  24.8*  24.6*   MCHC  33.6  31.7*   RDW  34.3*  37.2   PLATELETCT  186  151*   MPV  10.9  11.5     Recent Labs      18   SODIUM  132*  138   POTASSIUM  3.4*  3.4*   CHLORIDE  103  108   CO2  18*  20   GLUCOSE  109*  97   BUN  8  8   CREATININE  0.61  0.51   CALCIUM  8.8  8.0*     Recent Labs      18    0315   ALTSGPT  41   --    ASTSGOT  24   --    ALKPHOSPHAT  78   --    TBILIRUBIN  1.0   --    GLUCOSE  109*  97                 No results found for: TROPONINI  Urinalysis:    Lab Results  Component Value Date/Time   SPECGRAVITY 1.020 02/12/2018 2155   GLUCOSEUR Negative 02/12/2018 2155   KETONES 40 (A) 02/12/2018 2155   NITRITE Negative 02/12/2018 2155   WBCURINE 0-2 02/12/2018 2155   RBCURINE 2-5 (A) 02/12/2018 2155   BACTERIA Few (A) 02/12/2018 2155   EPITHELCELL Few 02/12/2018 2155        Imaging  CTA chest with no clear evidence of pulmonary embolism    Assessment/Plan     I anticipate this patient will require at least two midnights for appropriate medical management, necessitating inpatient admission.    * Sepsis (CMS-[facility])   Assessment & Plan    This is sepsis (without associated acute organ dysfunction).  Based on tachycardia and leukocytosis with positive streptococcal testing.  Continue current antibiotic regimen, fluids.  Monitor.         Obesity   Assessment & Plan    Body mass index is 33.52 kg/m².          Hyperthyroidism   Assessment & Plan    FT4 currently elevated, TSH suppressed, which can occur in beginning of therapy for hyperthyroidism.  ?if patient compliant with medication regimen, or findings due to current underlying infection.  Monitor.             VTE prophylaxis: SCD, Lovenox.

## 2018-02-13 NOTE — DISCHARGE INSTRUCTIONS
Discharge Instructions    Discharged to home by car with relative. Discharged via wheelchair, hospital escort: Yes.  Special equipment needed: Not Applicable    Be sure to schedule a follow-up appointment with your primary care doctor or any specialists as instructed.     Discharge Plan:   Influenza Vaccine Indication: Not indicated: Previously immunized this influenza season and > 8 years of age    I understand that a diet low in cholesterol, fat, and sodium is recommended for good health. Unless I have been given specific instructions below for another diet, I accept this instruction as my diet prescription.   Other diet:Regular    Special Instructions: None    · Is patient discharged on Warfarin / Coumadin?   No     Depression / Suicide Risk    As you are discharged from this [facility] facility, it is important to learn how to keep safe from harming yourself.    Recognize the warning signs:  · Abrupt changes in personality, positive or negative- including increase in energy   · Giving away possessions  · Change in eating patterns- significant weight changes-  positive or negative  · Change in sleeping patterns- unable to sleep or sleeping all the time   · Unwillingness or inability to communicate  · Depression  · Unusual sadness, discouragement and loneliness  · Talk of wanting to die  · Neglect of personal appearance   · Rebelliousness- reckless behavior  · Withdrawal from people/activities they love  · Confusion- inability to concentrate     If you or a loved one observes any of these behaviors or has concerns about self-harm, here's what you can do:  · Talk about it- your feelings and reasons for harming yourself  · Remove any means that you might use to hurt yourself (examples: pills, rope, extension cords, firearm)  · Get professional help from the community (Mental Health, Substance Abuse, psychological counseling)  · Do not be alone:Call your Safe Contact- someone whom you trust who will be there for  you.  · Call your local CRISIS HOTLINE 363-8149 or 375-970-2478  · Call your local Children's Mobile Crisis Response Team [place] (699) 631-4974 or www.[employer]  · Call the toll free National Suicide Prevention Hotlines   · National Suicide Prevention Lifeline 603-843-PQVH (2018)  · National [employer] Line Network 800-SUICIDE (103-4775)

## 2018-02-13 NOTE — PROGRESS NOTES
Pt arrived via gurney from emergency, alert and oriented, SOB with exertion. Pt denies pain, Discussed POC, safety precautions in place. Will continue to monitor.

## 2018-02-13 NOTE — DISCHARGE SUMMARY
CHIEF COMPLAINT ON ADMISSION  Chief Complaint   Patient presents with   • Flu Like Symptoms   • Shortness of Breath       CODE STATUS  Full Code    HPI & HOSPITAL COURSE  This is a 22 y.o. female here with sepsis and strep throat.     She was treated with clindamycin due to an allergy to pcn.  She responded very quickly to treatment. Her throat pain has greatly improved and she is tolerating a regular diet without difficulty.  Her mild sob, which she attributed to sinus congestion has resolved.  She had signs of sepsis on admission and these resolved with treatment.  Her tachycardia has resolved and so has her leukocytosis.    She was given supplementation for mild hypokalemia.  She was also found to have a significantly low TSH at 0.005 and an elevated Free T4 of 2.83.  She admitted to me that she had missed several recent doses of her methimazole. She agrees to follow up with her endocrinologist later this week.  She also agrees to return to the er if any new or worsening symptoms.  She also agrees to return if tachycardia returns.    The patient recovered much more quickly than anticipated on admission.    Therefore, she is discharged in fair and stable condition with close outpatient follow-up.    SPECIFIC OUTPATIENT FOLLOW-UP  Dr. Gerry Briseno with [facility] pcp    DISCHARGE PROBLEM LIST  Principal Problem (Resolved):    Sepsis (CMS-[facility]) POA: Unknown  Active Problems:    Hyperthyroidism POA: Unknown    Obesity POA: Unknown    Physical Exam   Constitutional: She is oriented to person, place, and time. She appears well-developed and well-nourished. No distress.   HENT:   Head: Normocephalic and atraumatic.   Mouth/Throat: Oropharynx is clear and moist. No oropharyngeal exudate.   Eyes: Conjunctivae and EOM are normal. Pupils are equal, round, and reactive to light. Right eye exhibits no discharge. Left eye exhibits no discharge. No scleral icterus.   Neck: Normal range of motion. Neck supple. No tracheal  deviation present. No thyromegaly present.   Cardiovascular: Normal rate, normal heart sounds and intact distal pulses.  Exam reveals no gallop and no friction rub.    No murmur heard.  Pulmonary/Chest: Effort normal and breath sounds normal. No stridor. No respiratory distress. She has no wheezes. She has no rales. She exhibits no tenderness.   Abdominal: Soft. Bowel sounds are normal. She exhibits no distension and no mass. There is no tenderness. There is no rebound and no guarding.   Musculoskeletal: Normal range of motion. She exhibits no edema or tenderness.   Lymphadenopathy:     She has cervical adenopathy.   Neurological: She is alert and oriented to person, place, and time. She has normal reflexes. No cranial nerve deficit. She exhibits normal muscle tone. Coordination normal.   Skin: Skin is warm and dry. No rash noted. She is not diaphoretic. No erythema. No pallor.   Psychiatric: She has a normal mood and affect. Her behavior is normal. Judgment and thought content normal.   Nursing note and vitals reviewed.    FOLLOW UP  Future Appointments  Date Time Provider Department Center   2/15/2018 9:00 AM CARE MANAGER [facility]   2/22/2018 10:20 AM Alexa Jenkins M.D. [facility]   3/6/2018 10:40 AM ARABELLA Elias M.D.  [address] [place] [address] [address]  [place] [address]  [phone]    Schedule an appointment as soon as possible for a visit in 1 week  Hospital follow-up appointment with PCP    Sanjana Garrett M.D.  [address] [place] [address] [address] [place] [id number]  [phone]    In 1 week        MEDICATIONS ON DISCHARGE   Tyrone Damari   Home Medication Instructions DOMENICA:[id number]    Printed on:02/13/18 1120   Medication Information                      clindamycin (CLEOCIN) 300 MG Cap  Take 1 Cap by mouth 3 times a day.             methimazole (TAPAZOLE) 10 MG Tab  Take 10 mg by mouth 2 Times a Day. Take 3 tablets in am and 2 tablets at pm                 DIET  Orders Placed This  Encounter   Procedures   • Diet Order     Standing Status:   Standing     Number of Occurrences:   1     Order Specific Question:   Diet:     Answer:   Regular [1]       ACTIVITY  As tolerated.        CONSULTATIONS      PROCEDURES  CT-CTA CHEST PULMONARY ARTERY W/ RECONS   Final Result         1. No definite CT evidence of pulmonary embolism but evaluation is limited due to poor contrast enhancement.                LABORATORY  Lab Results   Component Value Date/Time    SODIUM 138 02/13/2018 03:15 AM    POTASSIUM 3.4 (L) 02/13/2018 03:15 AM    CHLORIDE 108 02/13/2018 03:15 AM    CO2 20 02/13/2018 03:15 AM    GLUCOSE 97 02/13/2018 03:15 AM    BUN 8 02/13/2018 03:15 AM    CREATININE 0.51 02/13/2018 03:15 AM        Lab Results   Component Value Date/Time    WBC 9.9 02/13/2018 03:15 AM    HEMOGLOBIN 12.0 02/13/2018 03:15 AM    HEMATOCRIT 37.8 02/13/2018 03:15 AM    PLATELETCT 151 (L) 02/13/2018 03:15 AM        Total time of the discharge process exceeds 36 minutes

## 2018-02-13 NOTE — ASSESSMENT & PLAN NOTE
"FT4 currently elevated, TSH suppressed  Patient states she missed at least a \"few\" recent doses of methamazole  She agrees to follow up with dr Garrett soon  "

## 2018-02-13 NOTE — CARE PLAN
Problem: Safety  Goal: Will remain free from injury  Outcome: PROGRESSING AS EXPECTED  Safety precautions in place, instructed to use call light. Will cont to monitor.    Problem: Fluid Volume:  Goal: Will maintain balanced intake and output  Outcome: PROGRESSING AS EXPECTED  Encouraged to increase fluid intake.

## 2018-02-13 NOTE — ED PROVIDER NOTES
"ER Provider Note         CHIEF COMPLAINT  Chief Complaint   Patient presents with   • Flu Like Symptoms   • Shortness of Breath       HPI  Damari Hansen is a 22 y.o. female who presents to the Emergency Department with sore throat as well as shortness of breath with a mild cough. The patient says that she has not been able to eat and drink very much secondary to the severe pain in her throat. In addition the patient also has a history of Graves' disease and is taking methimazole. The patient denies any chest pain. The patient denies any dysuria at this time. There is no diarrhea either. Patient mentions that she is feeling quite weak. Her shortness of breath is more stress to get up and walk around. She feels some achiness throughout her body as well. His symptoms been going on for about 2-3 days she has had not much change in the symptomology.    REVIEW OF SYSTEMS  See HPI for further details. All other systems are negative.     PAST MEDICAL HISTORY   has a past medical history of Hyperthyroidism.    SURGICAL HISTORY  patient denies any surgical history    SOCIAL HISTORY  Social History   Substance Use Topics   • Smoking status: Never Smoker   • Smokeless tobacco: Never Used   • Alcohol use No      History   Drug Use No       FAMILY HISTORY  History reviewed. No pertinent family history.    CURRENT MEDICATIONS  Home Medications     Reviewed by Lorna Blanc R.N. (Registered Nurse) on 02/12/18 at 2342  Med List Status: Partial   Medication Last Dose Status   methimazole (TAPAZOLE) 10 MG Tab  Active                ALLERGIES  Allergies   Allergen Reactions   • Amoxicillin Hives     Rxn - 2016       PHYSICAL EXAM  VITAL SIGNS: /86   Pulse (!) 112   Temp 36.4 °C (97.5 °F)   Resp 17   Ht 1.753 m (5' 9\")   Wt 103.2 kg (227 lb 8.2 oz)   SpO2 95%   BMI 33.60 kg/m²      Constitutional: Mild distress and tachypnea HENT: No signs of trauma, Bilateral external ears normal, Nose normal. Posterior " oropharyngeal redness   Eyes: Pupils are equal and reactive, Conjunctiva normal, Non-icteric.   Neck: Normal range of motion, No tenderness, Supple, No stridor.   Lymphatic: No lymphadenopathy noted.   Cardiovascular: Tachycardic   Thorax & Lungs: Occasional rhonchi, tachypnea  Abdomen: Bowel sounds normal, Soft, No tenderness, No masses, No pulsatile masses. No peritoneal signs.  Skin: Warm, Dry, No erythema, No rash.   Back: No bony tenderness, No CVA tenderness.   Extremities: Intact distal pulses, No edema, No tenderness, No cyanosis.  Musculoskeletal: Good range of motion in all major joints. No tenderness to palpation or major deformities noted.   Neurologic: Alert , Normal motor function, Normal sensory function, No focal deficits noted.   Psychiatric: Affect normal, Judgment normal, Mood normal.     DIAGNOSTIC STUDIES / PROCEDURES           LABS  Labs Reviewed   CBC WITH DIFFERENTIAL - Abnormal; Notable for the following:        Result Value    WBC 16.4 (*)     RBC 5.73 (*)     MCV 73.6 (*)     MCH 24.8 (*)     RDW 34.3 (*)     Neutrophils-Polys 75.50 (*)     Lymphocytes 16.00 (*)     Neutrophils (Absolute) 12.33 (*)     Monos (Absolute) 1.33 (*)     All other components within normal limits   COMP METABOLIC PANEL - Abnormal; Notable for the following:     Sodium 132 (*)     Potassium 3.4 (*)     Co2 18 (*)     Glucose 109 (*)     All other components within normal limits   RAPID STREP,CULT IF INDICATED - Abnormal; Notable for the following:     Significant Indicator POS (*)     Rapid Strep Screen Positive for Group A streptococcus. (*)     All other components within normal limits   TSH - Abnormal; Notable for the following:     TSH <0.005 (*)     All other components within normal limits   FREE THYROXINE - Abnormal; Notable for the following:     Free T-4 >6.00 (*)     All other components within normal limits   URINALYSIS,CULTURE IF INDICATED - Abnormal; Notable for the following:     Character Sl Cloudy  "(*)     Ketones 40 (*)     Protein 100 (*)     Occult Blood Trace (*)     All other components within normal limits   URINE MICROSCOPIC (W/UA) - Abnormal; Notable for the following:     RBC 2-5 (*)     Bacteria Few (*)     All other components within normal limits   LACTIC ACID   BLOOD CULTURE    Narrative:     Per Hospital Policy: Only change Specimen Src: to \"Line\" if  specified by physician order.   BLOOD CULTURE    Narrative:     Per Hospital Policy: Only change Specimen Src: to \"Line\" if  specified by physician order.   HCG QUALITATIVE UR   INFLUENZA RAPID   REFRACTOMETER SG   ESTIMATED GFR   UR BILI ICTOTEST   HEMOGLOBIN A1C   INFLUENZA A/B BY PCR   CULTURE RESPIRATORY W/ [facility] STN   LACTIC ACID   CBC WITH DIFFERENTIAL   BASIC METABOLIC PANEL       All labs reviewed by me.    RADIOLOGY  CT-CTA CHEST PULMONARY ARTERY W/ RECONS   Final Result         1. No definite CT evidence of pulmonary embolism but evaluation is limited due to poor contrast enhancement.                The radiologist's interpretation of all radiological studies have been reviewed by me.    COURSE & MEDICAL DECISION MAKING  Pertinent Labs & Imaging studies reviewed. (See chart for details)    This is a 22 y.o. female that presents  with tachycardia as well as shortness of breath. I think this could represent pneumonia and I'm also concerned that her sore throat may represent strep pharyngitis. Additionally this could represent a pulmonary embolism. This time I'll give CT scan of the patient's chest as well as a TSH given the fact she does have a history of Graves. Initial lactic acid as well as CBC and BMP. I'll see her influenza swab given the symptoms could represent influenza. I will also send blood cultures given her fever. Finally I will give a rapid strep given the fact the patient has sore throat and fever.      The patient was found to have a negative CT scan of her chest showing no pneumonia. The patient's lactic acid was normal. The " "patient's blood cultures were sent. She was found to have a negative flu as well as a positive strep throat. I gave her azithromycin for her strep throat. In addition the patient did have a leukocytosis of 16. She did have some mild hyponatremia and she did have a low CO2 of 18. At this point the patient is not tolerating oral intake well. Next    After 2 L of fluid the patient continues to be tachycardic. Initially the patient is still mildly tachypneic and given the fact she does have a fever as well as her tachycardia in addition to her strep infection I will admit the patient. She has no meningismus and a supple neck x-ray do not believe there is any deep space infection this time. Patient will be admitted in guarded condition.      I examined the patient 2/13/2018 11:09 pm  Vital Signs:/86   Pulse (!) 112   Temp 36.4 °C (97.5 °F)   Resp 17   Ht 1.753 m (5' 9\")   Wt 103.2 kg (227 lb 8.2 oz)   SpO2 95%   BMI 33.60 kg/m²   Cardiac examination significant for Tachycardia  Pulmonary examination significant for Crackles  Capillary refill is brisk  Peripheral Pulse is 2+   Skin is flushed    FINAL IMPRESSION  1. Sepsis, due to unspecified organism (CMS-HCC)    2. Hyponatremia    3. Strep pharyngitis              Electronically signed by: Chaitanya Charles, 2/12/2018    "

## 2018-02-15 ENCOUNTER — PATIENT OUTREACH (OUTPATIENT)
Dept: HEALTH INFORMATION MANAGEMENT | Facility: OTHER | Age: 23
End: 2018-02-15

## 2018-02-17 LAB
BACTERIA BLD CULT: NORMAL
BACTERIA BLD CULT: NORMAL
SIGNIFICANT IND 70042: NORMAL
SIGNIFICANT IND 70042: NORMAL
SITE SITE: NORMAL
SITE SITE: NORMAL
SOURCE SOURCE: NORMAL
SOURCE SOURCE: NORMAL

## 2018-02-24 NOTE — ADDENDUM NOTE
Encounter addended by: Abdulkadir Pruitt M.D. on: 2/24/2018 10:08 AM<BR>    Actions taken: Sign clinical note, Results reviewed in IB

## 2019-05-23 ENCOUNTER — APPOINTMENT (OUTPATIENT)
Dept: RADIOLOGY | Facility: MEDICAL CENTER | Age: 24
End: 2019-05-23
Attending: EMERGENCY MEDICINE
Payer: COMMERCIAL

## 2019-05-23 ENCOUNTER — HOSPITAL ENCOUNTER (EMERGENCY)
Facility: MEDICAL CENTER | Age: 24
End: 2019-05-24
Attending: EMERGENCY MEDICINE
Payer: COMMERCIAL

## 2019-05-23 ENCOUNTER — APPOINTMENT (OUTPATIENT)
Dept: URGENT CARE | Facility: MEDICAL CENTER | Age: 24
End: 2019-05-23

## 2019-05-23 DIAGNOSIS — N83.201 CYST OF RIGHT OVARY: ICD-10-CM

## 2019-05-23 DIAGNOSIS — R10.2 PELVIC PAIN: ICD-10-CM

## 2019-05-23 LAB
ALBUMIN SERPL BCP-MCNC: 3.9 G/DL (ref 3.2–4.9)
ALBUMIN/GLOB SERPL: 1.3 G/DL
ALP SERPL-CCNC: 84 U/L (ref 30–99)
ALT SERPL-CCNC: 31 U/L (ref 2–50)
ANION GAP SERPL CALC-SCNC: 10 MMOL/L (ref 0–11.9)
APPEARANCE UR: CLEAR
AST SERPL-CCNC: 22 U/L (ref 12–45)
BACTERIA #/AREA URNS HPF: ABNORMAL /HPF
BACTERIA GENITAL QL WET PREP: NORMAL
BASOPHILS # BLD AUTO: 0.4 % (ref 0–1.8)
BASOPHILS # BLD: 0.04 K/UL (ref 0–0.12)
BILIRUB SERPL-MCNC: 0.5 MG/DL (ref 0.1–1.5)
BILIRUB UR QL STRIP.AUTO: NEGATIVE
BUN SERPL-MCNC: 12 MG/DL (ref 8–22)
CALCIUM SERPL-MCNC: 8.8 MG/DL (ref 8.4–10.2)
CHLORIDE SERPL-SCNC: 106 MMOL/L (ref 96–112)
CO2 SERPL-SCNC: 20 MMOL/L (ref 20–33)
COLOR UR: YELLOW
CREAT SERPL-MCNC: 0.67 MG/DL (ref 0.5–1.4)
EOSINOPHIL # BLD AUTO: 0.15 K/UL (ref 0–0.51)
EOSINOPHIL NFR BLD: 1.4 % (ref 0–6.9)
EPI CELLS #/AREA URNS HPF: ABNORMAL /HPF
ERYTHROCYTE [DISTWIDTH] IN BLOOD BY AUTOMATED COUNT: 37 FL (ref 35.9–50)
GLOBULIN SER CALC-MCNC: 3 G/DL (ref 1.9–3.5)
GLUCOSE SERPL-MCNC: 92 MG/DL (ref 65–99)
GLUCOSE UR STRIP.AUTO-MCNC: NEGATIVE MG/DL
HCG SERPL QL: NEGATIVE
HCT VFR BLD AUTO: 43.7 % (ref 37–47)
HGB BLD-MCNC: 14.4 G/DL (ref 12–16)
HYALINE CASTS #/AREA URNS LPF: ABNORMAL /LPF
IMM GRANULOCYTES # BLD AUTO: 0.03 K/UL (ref 0–0.11)
IMM GRANULOCYTES NFR BLD AUTO: 0.3 % (ref 0–0.9)
KETONES UR STRIP.AUTO-MCNC: NEGATIVE MG/DL
LEUKOCYTE ESTERASE UR QL STRIP.AUTO: NEGATIVE
LIPASE SERPL-CCNC: 29 U/L (ref 7–58)
LYMPHOCYTES # BLD AUTO: 3.9 K/UL (ref 1–4.8)
LYMPHOCYTES NFR BLD: 35.1 % (ref 22–41)
MCH RBC QN AUTO: 26.2 PG (ref 27–33)
MCHC RBC AUTO-ENTMCNC: 33 G/DL (ref 33.6–35)
MCV RBC AUTO: 79.6 FL (ref 81.4–97.8)
MICRO URNS: ABNORMAL
MONOCYTES # BLD AUTO: 0.77 K/UL (ref 0–0.85)
MONOCYTES NFR BLD AUTO: 6.9 % (ref 0–13.4)
MUCOUS THREADS #/AREA URNS HPF: ABNORMAL /HPF
NEUTROPHILS # BLD AUTO: 6.22 K/UL (ref 2–7.15)
NEUTROPHILS NFR BLD: 55.9 % (ref 44–72)
NITRITE UR QL STRIP.AUTO: NEGATIVE
NRBC # BLD AUTO: 0 K/UL
NRBC BLD-RTO: 0 /100 WBC
PH UR STRIP.AUTO: 5.5 [PH]
PLATELET # BLD AUTO: 219 K/UL (ref 164–446)
PMV BLD AUTO: 10.6 FL (ref 9–12.9)
POTASSIUM SERPL-SCNC: 3.8 MMOL/L (ref 3.6–5.5)
PROT SERPL-MCNC: 6.9 G/DL (ref 6–8.2)
PROT UR QL STRIP: 30 MG/DL
RBC # BLD AUTO: 5.49 M/UL (ref 4.2–5.4)
RBC # URNS HPF: ABNORMAL /HPF
RBC UR QL AUTO: NEGATIVE
SIGNIFICANT IND 70042: NORMAL
SITE SITE: NORMAL
SODIUM SERPL-SCNC: 136 MMOL/L (ref 135–145)
SOURCE SOURCE: NORMAL
SP GR UR REFRACTOMETRY: 1.03
WBC # BLD AUTO: 11.1 K/UL (ref 4.8–10.8)
WBC #/AREA URNS HPF: ABNORMAL /HPF

## 2019-05-23 PROCEDURE — 84703 CHORIONIC GONADOTROPIN ASSAY: CPT

## 2019-05-23 PROCEDURE — 85025 COMPLETE CBC W/AUTO DIFF WBC: CPT

## 2019-05-23 PROCEDURE — 80053 COMPREHEN METABOLIC PANEL: CPT

## 2019-05-23 PROCEDURE — 96375 TX/PRO/DX INJ NEW DRUG ADDON: CPT

## 2019-05-23 PROCEDURE — 81001 URINALYSIS AUTO W/SCOPE: CPT

## 2019-05-23 PROCEDURE — 83690 ASSAY OF LIPASE: CPT

## 2019-05-23 PROCEDURE — 87491 CHLMYD TRACH DNA AMP PROBE: CPT

## 2019-05-23 PROCEDURE — 87591 N.GONORRHOEAE DNA AMP PROB: CPT

## 2019-05-23 PROCEDURE — 99285 EMERGENCY DEPT VISIT HI MDM: CPT

## 2019-05-23 PROCEDURE — 96374 THER/PROPH/DIAG INJ IV PUSH: CPT

## 2019-05-23 PROCEDURE — 700111 HCHG RX REV CODE 636 W/ 250 OVERRIDE (IP): Performed by: EMERGENCY MEDICINE

## 2019-05-23 RX ORDER — ONDANSETRON 2 MG/ML
4 INJECTION INTRAMUSCULAR; INTRAVENOUS ONCE
Status: COMPLETED | OUTPATIENT
Start: 2019-05-23 | End: 2019-05-23

## 2019-05-23 RX ORDER — KETOROLAC TROMETHAMINE 30 MG/ML
30 INJECTION, SOLUTION INTRAMUSCULAR; INTRAVENOUS ONCE
Status: COMPLETED | OUTPATIENT
Start: 2019-05-23 | End: 2019-05-23

## 2019-05-23 RX ORDER — HYDROCODONE BITARTRATE AND ACETAMINOPHEN 5; 325 MG/1; MG/1
1 TABLET ORAL EVERY 6 HOURS PRN
Qty: 15 TAB | Refills: 0 | Status: SHIPPED | OUTPATIENT
Start: 2019-05-23 | End: 2019-05-26

## 2019-05-23 RX ORDER — KETOROLAC TROMETHAMINE 30 MG/ML
INJECTION, SOLUTION INTRAMUSCULAR; INTRAVENOUS
Status: DISCONTINUED
Start: 2019-05-23 | End: 2019-05-24 | Stop reason: HOSPADM

## 2019-05-23 RX ORDER — MORPHINE SULFATE 4 MG/ML
4 INJECTION, SOLUTION INTRAMUSCULAR; INTRAVENOUS ONCE
Status: COMPLETED | OUTPATIENT
Start: 2019-05-23 | End: 2019-05-23

## 2019-05-23 RX ADMIN — ONDANSETRON 4 MG: 2 INJECTION INTRAMUSCULAR; INTRAVENOUS at 19:55

## 2019-05-23 RX ADMIN — MORPHINE SULFATE 4 MG: 4 INJECTION INTRAVENOUS at 19:59

## 2019-05-23 RX ADMIN — KETOROLAC TROMETHAMINE 30 MG: 30 INJECTION, SOLUTION INTRAMUSCULAR at 22:04

## 2019-05-24 VITALS
OXYGEN SATURATION: 97 % | WEIGHT: 253.53 LBS | SYSTOLIC BLOOD PRESSURE: 143 MMHG | TEMPERATURE: 98.6 F | BODY MASS INDEX: 37.55 KG/M2 | HEART RATE: 94 BPM | DIASTOLIC BLOOD PRESSURE: 68 MMHG | HEIGHT: 69 IN | RESPIRATION RATE: 19 BRPM

## 2019-05-24 PROCEDURE — 76856 US EXAM PELVIC COMPLETE: CPT

## 2019-05-24 NOTE — ED NOTES
Dr. Bailon was at the bedside and gave the pt extensive d/c inst.  The pt verb understanding and denies questions.  The pt amb out of the ed w/o diff.

## 2019-05-24 NOTE — ED PROVIDER NOTES
"ED Provider Note    CHIEF COMPLAINT  Chief Complaint   Patient presents with   • Pelvic Pain     Pt reports acute onset of pelvic pain 3 min ago \" feels like my IUD is stabbing me \"       HPI  Damari Hansen is a 23 y.o. G2, P2 female with LMP 18 months ago with an IUD who presents complaining of severe stabbing pelvic pain.    Patient states she had acute onset of stabbing, severe, lower/suprapubic pain at 5 PM today while eating with her children.  She describes it as 8 out of 10, nonradiating.  She denies exacerbating or alleviating factors.  She denies vaginal bleeding, fever, chills, trauma, nausea, vomiting, flank pain, urinary symptoms.  She took ibuprofen 800 mg without relief.      ALLERGIES  Allergies   Allergen Reactions   • Amoxicillin Hives     Rxn - 2016       CURRENT MEDICATIONS  Methimazole    PAST MEDICAL HISTORY   has a past medical history of Hyperthyroidism.    SURGICAL HISTORY  patient denies any surgical history    SOCIAL HISTORY  Social History     Social History Main Topics   • Smoking status: Never Smoker   • Smokeless tobacco: Never Used   • Alcohol use No   • Drug use: No   • Sexual activity: Yes     Partners: Male     Here with her significant other    REVIEW OF SYSTEMS  See HPI for further details.  All other systems are negative except as above in HPI.      PHYSICAL EXAM  VITAL SIGNS: /100   Pulse 85   Temp 36.4 °C (97.6 °F) (Temporal)   Resp (!) 22 Comment: hyper ventilating  Ht 1.753 m (5' 9\")   Wt 115 kg (253 lb 8.5 oz)   SpO2 96%   BMI 37.44 kg/m²     General:  WD obese, nontoxic appearing in NAD; A+Ox3; V/S as above; afebrile, not tachycardic or hyperventilating on my exam  Skin: warm and dry; good color; no rash  HEENT: NCAT; EOMs intact; PERRL; no scleral icterus   Neck: FROM; soft  Cardiovascular: Regular heart rate and rhythm.  No murmurs, rubs, or gallops; pulses 2+ bilaterally radially and DP areas  Lungs: Clear to auscultation with good air movement " bilaterally.  No wheezes, rhonchi, or rales.   Abdomen: BS present; soft; NTND; no rebound, guarding, or rigidity.  No organomegaly or pulsatile mass; no CVAT   Pelvic: Normal external female genitalia; there is greenish stool on the perineum; white vaginal discharge is present with no cervical motion tenderness or palpable adnexal masses  Extremities: COLLINS x 4; no e/o trauma; no pedal edema; neg Ursula's  Neurologic: CNs III-XII grossly intact; speech clear; distal sensation intact; strength 5/5 UE/LEs  Psychiatric: Appropriate affect, normal mood    LABS  Results for orders placed or performed during the hospital encounter of 05/23/19   CBC WITH DIFFERENTIAL   Result Value Ref Range    WBC 11.1 (H) 4.8 - 10.8 K/uL    RBC 5.49 (H) 4.20 - 5.40 M/uL    Hemoglobin 14.4 12.0 - 16.0 g/dL    Hematocrit 43.7 37.0 - 47.0 %    MCV 79.6 (L) 81.4 - 97.8 fL    MCH 26.2 (L) 27.0 - 33.0 pg    MCHC 33.0 (L) 33.6 - 35.0 g/dL    RDW 37.0 35.9 - 50.0 fL    Platelet Count 219 164 - 446 K/uL    MPV 10.6 9.0 - 12.9 fL    Neutrophils-Polys 55.90 44.00 - 72.00 %    Lymphocytes 35.10 22.00 - 41.00 %    Monocytes 6.90 0.00 - 13.40 %    Eosinophils 1.40 0.00 - 6.90 %    Basophils 0.40 0.00 - 1.80 %    Immature Granulocytes 0.30 0.00 - 0.90 %    Nucleated RBC 0.00 /100 WBC    Neutrophils (Absolute) 6.22 2.00 - 7.15 K/uL    Lymphs (Absolute) 3.90 1.00 - 4.80 K/uL    Monos (Absolute) 0.77 0.00 - 0.85 K/uL    Eos (Absolute) 0.15 0.00 - 0.51 K/uL    Baso (Absolute) 0.04 0.00 - 0.12 K/uL    Immature Granulocytes (abs) 0.03 0.00 - 0.11 K/uL    NRBC (Absolute) 0.00 K/uL   COMP METABOLIC PANEL   Result Value Ref Range    Sodium 136 135 - 145 mmol/L    Potassium 3.8 3.6 - 5.5 mmol/L    Chloride 106 96 - 112 mmol/L    Co2 20 20 - 33 mmol/L    Anion Gap 10.0 0.0 - 11.9    Glucose 92 65 - 99 mg/dL    Bun 12 8 - 22 mg/dL    Creatinine 0.67 0.50 - 1.40 mg/dL    Calcium 8.8 8.4 - 10.2 mg/dL    AST(SGOT) 22 12 - 45 U/L    ALT(SGPT) 31 2 - 50 U/L    Alkaline  Phosphatase 84 30 - 99 U/L    Total Bilirubin 0.5 0.1 - 1.5 mg/dL    Albumin 3.9 3.2 - 4.9 g/dL    Total Protein 6.9 6.0 - 8.2 g/dL    Globulin 3.0 1.9 - 3.5 g/dL    A-G Ratio 1.3 g/dL   LIPASE   Result Value Ref Range    Lipase 29 7 - 58 U/L   HCG QUAL SERUM   Result Value Ref Range    Beta-Hcg Qualitative Serum Negative Negative   URINALYSIS,CULTURE IF INDICATED   Result Value Ref Range    Color Yellow     Character Clear     Ph 5.5 5.0 - 8.0    Glucose Negative Negative mg/dL    Ketones Negative Negative mg/dL    Protein 30 (A) Negative mg/dL    Bilirubin Negative Negative    Nitrite Negative Negative    Leukocyte Esterase Negative Negative    Occult Blood Negative Negative    Micro Urine Req Microscopic    WET PREP   Result Value Ref Range    Significant Indicator NEG     Source GEN     Site VAGINAL     Wet Prep For Parasites       No yeast.  No motile Trichomonas seen.  Few WBCs seen.  Few clue cells seen.     CHLAMYDIA & GC BY PCR   Result Value Ref Range    Source Vaginal    REFRACTOMETER SG   Result Value Ref Range    Specific Gravity 1.033    URINE MICROSCOPIC (W/UA)   Result Value Ref Range    WBC 0-2 /hpf    RBC 0-2 /hpf    Bacteria Few (A) None /hpf    Epithelial Cells Few Few /hpf    Mucous Threads Many /hpf    Hyaline Cast 0-2 /lpf   ESTIMATED GFR   Result Value Ref Range    GFR If African American >60 >60 mL/min/1.73 m 2    GFR If Non African American >60 >60 mL/min/1.73 m 2         IMAGING  US-PELVIC COMPLETE (TRANSABDOMINAL/TRANSVAGINAL) (COMBO)    (Results Pending)       MEDICAL RECORD  I have reviewed patient's medical record and pertinent results are listed below.      COURSE & MEDICAL DECISION MAKING  I have reviewed any medical record information, laboratory studies and radiographic results as noted.    Damari Hansen is a 23 y.o. female who presents complaining of midline pelvic/suprapubic pain that was acute in onset prior to arrival.  Patient was quite anxious on arrival but on my exam is  more calm, afebrile, hemodynamically stable.  Patient has a soft, nontender, nonsurgical abdomen.      Differential diagnosis includes but is not limited to ectopic pregnancy, ovarian cyst, ovarian torsion, UTI, IUD displacement.    Patient was given morphine and Zofran for pain.    Labs demonstrate no UTI.  Few clue cells noted.  Patient has a mild leukocytosis.    Patient is awaiting ultrasound.  She requests more pain medication.  Toradol has been ordered.    I will sign the patient out to Dr. Bailon who will follow up on ultrasound results.  If negative and the patient is feeling improved, she may be discharged with abdominal pain return precautions for [facility] for continued pain, fever, vomiting, or other concerns.  She should follow-up with her gynecologist for recheck tomorrow.    11:00 PM  Pt advised of plan to f/u on US results by Dr. Bailon.      FINAL IMPRESSION  1. Pelvic pain             Electronically signed by: Pattie Palmer, 5/23/2019 7:17 PM

## 2019-05-24 NOTE — DISCHARGE INSTRUCTIONS
Today your ultrasound showed: IUD identified in correct position within the endometrial cavity.    probable hemorrhagic cyst in the right ovary measuring 2.3 cm.  Small amount of pelvic free fluid.    Follow-up with your gynecologist tomorrow for recheck    Take Norco as needed for severe pain that is not controlled by ibuprofen    Go to [facility] where there is a gynecologist on call for increased persistent or increased pain, fever, or other concerns    You are being prescribed a narcotic. Narcotics are addictive. Please take the narcotic sparingly and only as needed for pain. Do not drink alcohol, operate heavy machinery, or drive while taking a narcotic as it may impair your judgment and motor skills. If the narcotic contains acetaminophen, you should not take other acetaminophen containing products, such as Tylenol, while taking this medication as it may affect your liver.

## 2019-05-24 NOTE — ED TRIAGE NOTES
Pt denies vag bleeding Pt appears distressed Crying,  Hyperventilating and  shaking in triage  Pink warm and dry Pt reassured

## 2019-05-24 NOTE — ED NOTES
"The pt stated that her pain \" is coming back \".  ERP updated.  N.o. Rec'd. Medicated for pain.   "

## 2019-05-24 NOTE — ED PROVIDER NOTES
Patient CARE signed out to me pending ultrasound.  US-PELVIC COMPLETE (TRANSABDOMINAL/TRANSVAGINAL) (COMBO)   Final Result   Addendum 1 of 1   There is a typo in the findings and impression of the report. In the    findings section it should say IUD identified appropriately positioned    within the endometrial cavity.      In the impression it should read: IUD identified appropriately positioned    within the endometrial cavity.      Final      IUD identified probably position within the endometrial cavity.      Probable hemorrhagic cyst in the right ovary measuring 2.3 cm.      Small amount of pelvic free fluid.         Patient made aware of ultrasound results and follow-up plan with GYN.  Patient tolerating p.o. prior to discharge.  I explained to patient and significant other that these hemorrhagic cysts can have worsening pain and rarely can result in worsening bleeding and if she were to experience significant worsening pain that she should return within 24 to 40 hours for repeat abdominal exam in the emergency department.  Patient agrees to discuss today's visit with her GYN and obtain f/u visit in next week.

## 2020-06-10 ENCOUNTER — OFFICE VISIT (OUTPATIENT)
Dept: URGENT CARE | Facility: CLINIC | Age: 25
End: 2020-06-10
Payer: COMMERCIAL

## 2020-06-10 VITALS
HEART RATE: 78 BPM | RESPIRATION RATE: 20 BRPM | WEIGHT: 258 LBS | DIASTOLIC BLOOD PRESSURE: 85 MMHG | SYSTOLIC BLOOD PRESSURE: 132 MMHG | BODY MASS INDEX: 38.1 KG/M2 | OXYGEN SATURATION: 96 % | TEMPERATURE: 98.3 F

## 2020-06-10 DIAGNOSIS — H65.93 MEE (MIDDLE EAR EFFUSION), BILATERAL: ICD-10-CM

## 2020-06-10 DIAGNOSIS — G43.009 MIGRAINE WITHOUT AURA AND WITHOUT STATUS MIGRAINOSUS, NOT INTRACTABLE: Primary | ICD-10-CM

## 2020-06-10 PROBLEM — E04.9 NON-TOXIC GOITER: Status: ACTIVE | Noted: 2020-06-10

## 2020-06-10 PROBLEM — Z34.90 PATIENT CURRENTLY PREGNANT: Status: ACTIVE | Noted: 2020-06-10

## 2020-06-10 PROCEDURE — 99204 OFFICE O/P NEW MOD 45 MIN: CPT | Performed by: PHYSICIAN ASSISTANT

## 2020-06-10 RX ORDER — KETOROLAC TROMETHAMINE 30 MG/ML
60 INJECTION, SOLUTION INTRAMUSCULAR; INTRAVENOUS ONCE
Status: COMPLETED | OUTPATIENT
Start: 2020-06-10 | End: 2020-06-10

## 2020-06-10 RX ORDER — DIPHENHYDRAMINE HCL 25 MG
50 CAPSULE ORAL ONCE
Status: COMPLETED | OUTPATIENT
Start: 2020-06-10 | End: 2020-06-10

## 2020-06-10 RX ORDER — METHYLPREDNISOLONE 4 MG/1
TABLET ORAL
Qty: 21 TAB | Refills: 0 | Status: SHIPPED | OUTPATIENT
Start: 2020-06-10 | End: 2020-08-06

## 2020-06-10 RX ORDER — PROMETHAZINE HYDROCHLORIDE 25 MG/1
25 TABLET ORAL EVERY 6 HOURS PRN
Qty: 30 TAB | Refills: 0 | Status: SHIPPED | OUTPATIENT
Start: 2020-06-10 | End: 2021-05-13

## 2020-06-10 RX ORDER — SUMATRIPTAN 50 MG/1
50 TABLET, FILM COATED ORAL
Qty: 10 TAB | Refills: 3 | Status: SHIPPED | OUTPATIENT
Start: 2020-06-10 | End: 2022-08-02

## 2020-06-10 RX ORDER — ONDANSETRON 2 MG/ML
4 INJECTION INTRAMUSCULAR; INTRAVENOUS ONCE
Status: COMPLETED | OUTPATIENT
Start: 2020-06-10 | End: 2020-06-10

## 2020-06-10 RX ADMIN — KETOROLAC TROMETHAMINE 60 MG: 30 INJECTION, SOLUTION INTRAMUSCULAR; INTRAVENOUS at 10:33

## 2020-06-10 RX ADMIN — Medication 50 MG: at 10:30

## 2020-06-10 RX ADMIN — ONDANSETRON 4 MG: 2 INJECTION INTRAMUSCULAR; INTRAVENOUS at 10:39

## 2020-06-10 ASSESSMENT — FIBROSIS 4 INDEX: FIB4 SCORE: 0.47

## 2020-06-10 NOTE — PROGRESS NOTES
Subjective:      PT is a 24 y.o. female who presents with Migraine (X 3 days headache and nauseas,)            HPI  This is a recurrent issue. Pt notes migraine x 3 days with nausea and not responding to excedrin migraine which usually works for her she notes. Pt has not taken any Rx medications for this condition. Pt states the pain is a 7/10, aching in nature and worse at night. Pt denies CP, SOB, NVD, paresthesias, headaches, dizziness, change in vision, hives, or other joint pain. The pt's medication list, problem list, and allergies have been evaluated and reviewed during today's visit. Pt also notes mild right ear pain x 1 month.    PMH:  Past Medical History:   Diagnosis Date   • Hyperthyroidism        PSH:  Negative per pt.      Fam Hx:  the patient's family history is not pertinent to their current complaint      Soc HX:  Social History     Socioeconomic History   • Marital status: Single     Spouse name: Not on file   • Number of children: Not on file   • Years of education: Not on file   • Highest education level: Not on file   Occupational History   • Not on file   Social Needs   • Financial resource strain: Not on file   • Food insecurity     Worry: Not on file     Inability: Not on file   • Transportation needs     Medical: Not on file     Non-medical: Not on file   Tobacco Use   • Smoking status: Never Smoker   • Smokeless tobacco: Never Used   Substance and Sexual Activity   • Alcohol use: No   • Drug use: No   • Sexual activity: Yes     Partners: Male   Lifestyle   • Physical activity     Days per week: Not on file     Minutes per session: Not on file   • Stress: Not on file   Relationships   • Social connections     Talks on phone: Not on file     Gets together: Not on file     Attends Rastafari service: Not on file     Active member of club or organization: Not on file     Attends meetings of clubs or organizations: Not on file     Relationship status: Not on file   • Intimate partner violence      Fear of current or ex partner: Not on file     Emotionally abused: Not on file     Physically abused: Not on file     Forced sexual activity: Not on file   Other Topics Concern   • Not on file   Social History Narrative   • Not on file         Medications:    Current Outpatient Medications:   •  promethazine (PHENERGAN) 25 MG Tab, Take 1 Tab by mouth every 6 hours as needed for Nausea/Vomiting., Disp: 30 Tab, Rfl: 0  •  SUMAtriptan (IMITREX) 50 MG Tab, Take 1 Tab by mouth Once PRN for Migraine for up to 1 dose., Disp: 10 Tab, Rfl: 3  •  methimazole (TAPAZOLE) 10 MG Tab, Take 10 mg by mouth 2 Times a Day. Take 3 tablets in am and 2 tablets at pm, Disp: , Rfl:     Current Facility-Administered Medications:   •  diphenhydrAMINE (BENADRYL) capsule 50 mg, 50 mg, Oral, Once, Anoop Baez, P.A.-C.  •  ketorolac (TORADOL) injection 60 mg, 60 mg, Intramuscular, Once, Anoop Baez, P.A.-C.  •  ondansetron (ZOFRAN) syringe/vial injection 4 mg, 4 mg, Intramuscular, Once, Anoop Baez, P.A.-C.      Allergies:  Amoxicillin    ROS  Constitutional: Negative for fever, chills and malaise/fatigue.   HENT: Negative for congestion and sore throat.  +right ear pain  Eyes: Negative for blurred vision, double vision and photophobia.   Respiratory: Negative for cough and shortness of breath.  Cardiovascular: Negative for chest pain and palpitations.   Gastrointestinal: Negative for heartburn,  vomiting, abdominal pain, diarrhea and constipation. +nausea  Genitourinary: Negative for dysuria and flank pain.   Musculoskeletal: Negative for joint pain and myalgias.   Skin: Negative for itching and rash.   Neurological: Negative for dizziness, tingling and +headaches.   Endo/Heme/Allergies: Does not bruise/bleed easily.   Psychiatric/Behavioral: Negative for depression. The patient is not nervous/anxious.           Objective:     /85   Pulse 78   Temp 36.8 °C (98.3 °F)   Resp 20   Wt 117 kg (258 lb)   SpO2 96%    Breastfeeding Unknown   BMI 38.10 kg/m²      Physical Exam  Constitutional: PT is oriented to person, place, and time. PT appears well-developed and well-nourished. +mild distress from headache.   HENT:   Head: Normocephalic and atraumatic.   EARS: B/L ears: Hearing, external ear and ear canal normal. Tympanic membrane is bulging. A middle ear effusion is present with right worse than left   Mouth/Throat: Oropharynx is clear and moist. No oropharyngeal exudate.   Eyes: Conjunctivae normal and EOM are normal. Pupils are equal, round, and reactive to light.   Neck: Normal range of motion. Neck supple. No thyromegaly present.   Cardiovascular: Normal rate, regular rhythm, normal heart sounds and intact distal pulses.  Exam reveals no gallop and no friction rub.    No murmur heard.  Pulmonary/Chest: Effort normal and breath sounds normal. No respiratory distress. PT has no wheezes. PT has no rales. Pt exhibits no tenderness.   Abdominal: Soft. Bowel sounds are normal. PT exhibits no distension and no mass. There is no tenderness. There is no rebound and no guarding.   Musculoskeletal: Normal range of motion. PT exhibits no edema and no tenderness.   Neurological: PT is alert and oriented to person, place, and time. PT has normal reflexes. No cranial nerve deficit.   Skin: Skin is warm and dry. No rash noted. PT is not diaphoretic. No erythema.       Psychiatric: PT has a normal mood and affect. PT behavior is normal. Judgment and thought content normal.             Assessment/Plan:       1. Migraine without aura and without status migrainosus, not intractable  In clinic:  - diphenhydrAMINE (BENADRYL) capsule 50 mg  - ketorolac (TORADOL) injection 60 mg  - ondansetron (ZOFRAN) syringe/vial injection 4 mg  For home:  - promethazine (PHENERGAN) 25 MG Tab; Take 1 Tab by mouth every 6 hours as needed for Nausea/Vomiting.  Dispense: 30 Tab; Refill: 0  - SUMAtriptan (IMITREX) 50 MG Tab; Take 1 Tab by mouth Once PRN for  Migraine for up to 1 dose.  Dispense: 10 Tab; Refill: 3    2. MAGNUS (middle ear effusion), bilateral    Medrol pack as pt notes 1 month of flonase and antihistamine are not working    Rest, fluids encouraged.  AVS with medical info given.  Pt was in full understanding and agreement with the plan.  Differential diagnosis, natural history, supportive care, and indications for immediate follow-up discussed. All questions answered. Patient agrees with the plan of care.  Follow-up as needed if symptoms worsen or fail to improve to PCP, Urgent care or Emergency Room.

## 2020-08-04 ENCOUNTER — TELEPHONE (OUTPATIENT)
Dept: SCHEDULING | Facility: IMAGING CENTER | Age: 25
End: 2020-08-04

## 2020-08-06 ENCOUNTER — OFFICE VISIT (OUTPATIENT)
Dept: MEDICAL GROUP | Facility: IMAGING CENTER | Age: 25
End: 2020-08-06
Payer: COMMERCIAL

## 2020-08-06 VITALS
SYSTOLIC BLOOD PRESSURE: 120 MMHG | RESPIRATION RATE: 12 BRPM | BODY MASS INDEX: 36.29 KG/M2 | HEART RATE: 114 BPM | DIASTOLIC BLOOD PRESSURE: 80 MMHG | OXYGEN SATURATION: 96 % | TEMPERATURE: 98.1 F | HEIGHT: 69 IN | WEIGHT: 245 LBS

## 2020-08-06 DIAGNOSIS — Z86.19 H/O VIRAL ILLNESS: ICD-10-CM

## 2020-08-06 DIAGNOSIS — E05.90 HYPERTHYROIDISM: ICD-10-CM

## 2020-08-06 DIAGNOSIS — Z13.0 SCREENING FOR DEFICIENCY ANEMIA: ICD-10-CM

## 2020-08-06 DIAGNOSIS — Z01.84 IMMUNITY STATUS TESTING: ICD-10-CM

## 2020-08-06 DIAGNOSIS — Z13.220 SCREENING FOR HYPERLIPIDEMIA: ICD-10-CM

## 2020-08-06 DIAGNOSIS — Z76.89 ENCOUNTER TO ESTABLISH CARE WITH NEW DOCTOR: ICD-10-CM

## 2020-08-06 DIAGNOSIS — Z13.1 SCREENING FOR DIABETES MELLITUS (DM): ICD-10-CM

## 2020-08-06 PROBLEM — Z34.90 PATIENT CURRENTLY PREGNANT: Status: RESOLVED | Noted: 2020-06-10 | Resolved: 2020-08-06

## 2020-08-06 PROCEDURE — 99214 OFFICE O/P EST MOD 30 MIN: CPT | Performed by: NURSE PRACTITIONER

## 2020-08-06 RX ORDER — LORATADINE 10 MG/1
10 TABLET ORAL DAILY
COMMUNITY
End: 2023-10-02

## 2020-08-06 SDOH — HEALTH STABILITY: MENTAL HEALTH: HOW MANY STANDARD DRINKS CONTAINING ALCOHOL DO YOU HAVE ON A TYPICAL DAY?: 1 OR 2

## 2020-08-06 SDOH — HEALTH STABILITY: MENTAL HEALTH: HOW OFTEN DO YOU HAVE A DRINK CONTAINING ALCOHOL?: MONTHLY OR LESS

## 2020-08-06 ASSESSMENT — PAIN SCALES - GENERAL: PAINLEVEL: NO PAIN

## 2020-08-06 ASSESSMENT — PATIENT HEALTH QUESTIONNAIRE - PHQ9: CLINICAL INTERPRETATION OF PHQ2 SCORE: 0

## 2020-08-06 ASSESSMENT — FIBROSIS 4 INDEX: FIB4 SCORE: 0.47

## 2020-08-06 NOTE — PROGRESS NOTES
"Subjective:   CC: Establish Care and Fatigue    HISTORY OF THE PRESENT ILLNESS: Patient is a 24 y.o. female. Denies prior PCP. Previously seeing Dr. Sanjana Caceres, endocrinology for management of hyperthyroid. Patient has history of hyperthyroid, asthma, migraines, and allergies. Patient is here today to establish care and discuss recent viral illness.     States she needs paperwork filled out for her work to return due to previously reporting viral like symptoms. States she became ill on 7/19/2002 while at work. States she started to vomit while at work and was sent home and has had ongoing viral symptoms. States she was tested on 7/28/2020 for COVID-19, reports as negative. States overall she is feeling better and denies any fever, body aches, chills, N/V/D/C, cough, or sore throat at this time.  States she continues to feel fatigue and has intermittent SOB. States she has a history and uses her albuterol intermittently. Denies using medication at this time.    Hyperthyroidism  States she has a history of hyperthyroid. States she is currently not treating due to not likely how medication makes her feel. States previously on methimazole 10 mg and stopped medication due to feeling depression on medication. States she was under the treatment of Dr. Sanjana Caceres, but hasn't seen provider \"in a while\". States she was diagnosed when she was 14 years old. States she experiences intermittent racing heart rate, anxiety, and difficulty sleeping.    Allergies: Amoxicillin    Current Outpatient Medications Ordered in Epic   Medication Sig Dispense Refill   • loratadine (CLARITIN) 10 MG Tab Take 10 mg by mouth every day.     • SUMAtriptan (IMITREX) 50 MG Tab Take 1 Tab by mouth Once PRN for Migraine for up to 1 dose. 10 Tab 3   • promethazine (PHENERGAN) 25 MG Tab Take 1 Tab by mouth every 6 hours as needed for Nausea/Vomiting. 30 Tab 0     No current Epic-ordered facility-administered medications on file.      Past Medical " History:   Diagnosis Date   • Allergy     year round   • Asthma    • Hyperthyroidism     hyperthyroid    • Migraine    • Patient currently pregnant 6/10/2020     Past Surgical History:   Procedure Laterality Date   • TONSILLECTOMY       Social History     Tobacco Use   • Smoking status: Never Smoker   • Smokeless tobacco: Never Used   Substance Use Topics   • Alcohol use: Yes     Frequency: Monthly or less     Drinks per session: 1 or 2   • Drug use: No     Social History     Social History Narrative   • Not on file     Family History   Problem Relation Age of Onset   • Cancer Father         melanoma   • Cancer Paternal Grandfather         lymphoma   • Dementia Mother    • Cancer Maternal Aunt         breast cancer   • Dementia Maternal Grandmother    • Dementia Maternal Grandfather    • No Known Problems Paternal Grandmother    • Cancer Maternal Aunt         lymphmoa     Health Maintenance: Completed.    ROS:   Constitutional: Denies fever, chills, night sweats, weight loss/gain or malaise.  Fatigue, see HPI.  HENT: Denies nasal congestion, sore throat, hearing loss, enlarged thyroid, or difficulty swallowing.    Eyes: Denies changes in vision, pain. Does wear corrective wear.   Respiratory: Denies cough. Intermittent SOB at rest or activity, see HPI.    Cardiovascular: Denies chest pain, palpitations, or  leg swelling. Intermittent tachycardia, see HPI.  Gastrointestinal: Denies N/V/C/D, abdominal pain, loss appetite, reflux, or hematochezia.  Genitourinary: Denies difficulty voiding, dysuria, nocturia, or hematuria.   Skin: Negative for rash. History of worrisome moles. Sees Derm every 6 months.  Neurological: Negative for dizziness, focal weakness and headaches. History of migraines.  Endo/Heme/Allergies: Denies bruise/bleed easily, allergies.   Psychiatric/Behavioral: Denies depression, nervous/anxious. Difficulty sleeping, see HPI.     Objective:   Exam: /80   Pulse (!) 114   Temp 36.7 °C (98.1 °F)    "Resp 12   Ht 1.753 m (5' 9\")   Wt 111.1 kg (245 lb)   SpO2 96%  Body mass index is 36.18 kg/m².    General: Normal appearing. No distress.  HEENT: Normocephalic. Eyes conjunctiva clear lids with slight ptosis, dark circles around eyes bilaterally, PERRLA, ears normal shape and contour, canals are clear bilaterally, tympanic membranes pearly gray, intact, non-bulging, no drainage noted, no turbinate erythema, no polyps visible, oropharynx is with mild erythema, edema or exudates. Teeth intact. Patient wore a mask during visit.  Neck: Supple without JVD or abnormal masses. Enlarged thickened semi-firm mobile thyroid palpated, no nodules palpated, non-tender.  Pulmonary: Clear to ausculation.  Normal effort. No rales, ronchi, or wheezing.  Cardiovascular: Regular rate and rhythm without murmur. Pedal and radial pulses are intact and equal bilaterally.  Abdomen: Soft, nontender, nondistended. Normal bowel sounds. Liver and spleen are not palpable.  Neurologic: Grossly non-focal.  Lymph: No cervical, submandibular, or supraclavicular lymph nodes are palpable.  Skin: Warm and dry.  No obvious lesions.  Musculoskeletal: Normal gait. No extremity cyanosis, clubbing, or edema.  Psych: Normal mood and affect. Alert and oriented x3. Judgment and insight is normal.    Assessment & Plan:   1. Encounter to establish care with new doctor  2. Screening for diabetes mellitus (DM)  3. Screening for deficiency anemia  4. Screening for hyperlipidemia  5. Immunity status testing  Reviewed with patient medication use and side effects. Medical, past, surgical history reviewed with patient. Discussed with patient the risk and benefits of receiving vaccines. Discussed CDC recommendations for immunizations and USPSTF guidelines for screening exams. Discussed collecting Varicella titer before consider vaccine, verbalized understanding. Encouraged patient to wash hands regularly and avoid sick contacts while supporting immune system.  " Discussed preventive screening labs with patient, verbalized understanding. Will evaluate plan of care once labs are obtained.    -     VARICELLA ZOSTER IGG AB; Future        -     Comp Metabolic Panel; Future        -     CBC WITH DIFFERENTIAL; Future        -     Lipid Profile; Future    6. Hyperthyroidism  This is a chronic condition. Discussed at length with patient the importance of treating hyperthyroid, verbalized understanding. Discussed returning to Dr. Sanjana Caceres for further evaluation. States she will consider it. Discussed seeking emergency services if she experiences worsening symptoms. Will evaluate plan of care once labs are obtained.    -     TSH WITH REFLEX TO FT4  -     T3 FREE; Future    7. H/O viral illness  This is a resolving stable condition. Discussed supporting immune system, washing hands regularly, and wearing a mask when in public due to current COIVD-19 outbreak, verbalized understanding. Instructed to maintain hydration with water. Patient work signed that was provided by patient.    Return After completing lab work.    Please note that this dictation was created using voice recognition software. I have made every reasonable attempt to correct obvious errors, but I expect that there are errors of grammar and possibly content that I did not discover before finalizing the note.

## 2020-08-11 ENCOUNTER — TELEPHONE (OUTPATIENT)
Dept: MEDICAL GROUP | Facility: IMAGING CENTER | Age: 25
End: 2020-08-11

## 2020-08-11 PROBLEM — Z97.5 IUD (INTRAUTERINE DEVICE) IN PLACE: Status: ACTIVE | Noted: 2020-08-11

## 2020-08-11 NOTE — TELEPHONE ENCOUNTER
Please request latest pap smear results from Dr. Jasso, OB/GYN in [place], [place].  Bebe Vázquez, APRN-C

## 2020-08-11 NOTE — ASSESSMENT & PLAN NOTE
"States she has a history of hyperthyroid. States she is currently not treating due to not likely how medication makes her feel. States previously on methimazole 10 mg and stopped medication due to feeling depression on medication. States she was under the treatment of Dr. Sanjana Caceres, but hasn't seen provider \"in a while\". States she was diagnosed when she was 14 years old. States she experiences intermittent racing heart rate, anxiety, and difficulty sleeping.  "

## 2021-04-15 ENCOUNTER — OFFICE VISIT (OUTPATIENT)
Dept: MEDICAL GROUP | Facility: MEDICAL CENTER | Age: 26
End: 2021-04-15
Payer: COMMERCIAL

## 2021-04-15 VITALS
TEMPERATURE: 97.4 F | HEIGHT: 69 IN | WEIGHT: 270.5 LBS | SYSTOLIC BLOOD PRESSURE: 120 MMHG | BODY MASS INDEX: 40.07 KG/M2 | HEART RATE: 85 BPM | OXYGEN SATURATION: 98 % | RESPIRATION RATE: 20 BRPM | DIASTOLIC BLOOD PRESSURE: 80 MMHG

## 2021-04-15 DIAGNOSIS — F32.A ANXIETY AND DEPRESSION: ICD-10-CM

## 2021-04-15 DIAGNOSIS — F41.9 ANXIETY AND DEPRESSION: ICD-10-CM

## 2021-04-15 DIAGNOSIS — E05.90 HYPERTHYROIDISM: ICD-10-CM

## 2021-04-15 DIAGNOSIS — J45.20 MILD INTERMITTENT ASTHMA WITHOUT COMPLICATION: ICD-10-CM

## 2021-04-15 PROCEDURE — 99214 OFFICE O/P EST MOD 30 MIN: CPT | Performed by: NURSE PRACTITIONER

## 2021-04-15 RX ORDER — MONTELUKAST SODIUM 10 MG/1
10 TABLET ORAL DAILY
Qty: 30 TABLET | Refills: 1 | Status: SHIPPED | OUTPATIENT
Start: 2021-04-15 | End: 2021-06-10

## 2021-04-15 RX ORDER — FLUOXETINE 10 MG/1
10 CAPSULE ORAL DAILY
Qty: 30 CAPSULE | Refills: 1 | Status: SHIPPED | OUTPATIENT
Start: 2021-04-15 | End: 2021-05-13

## 2021-04-15 ASSESSMENT — ANXIETY QUESTIONNAIRES
4. TROUBLE RELAXING: NEARLY EVERY DAY
GAD7 TOTAL SCORE: 18
1. FEELING NERVOUS, ANXIOUS, OR ON EDGE: NEARLY EVERY DAY
2. NOT BEING ABLE TO STOP OR CONTROL WORRYING: NEARLY EVERY DAY
5. BEING SO RESTLESS THAT IT IS HARD TO SIT STILL: NEARLY EVERY DAY
3. WORRYING TOO MUCH ABOUT DIFFERENT THINGS: NEARLY EVERY DAY
6. BECOMING EASILY ANNOYED OR IRRITABLE: NOT AT ALL
7. FEELING AFRAID AS IF SOMETHING AWFUL MIGHT HAPPEN: NEARLY EVERY DAY

## 2021-04-15 ASSESSMENT — PATIENT HEALTH QUESTIONNAIRE - PHQ9
CLINICAL INTERPRETATION OF PHQ2 SCORE: 6
SUM OF ALL RESPONSES TO PHQ QUESTIONS 1-9: 12
5. POOR APPETITE OR OVEREATING: 3 - NEARLY EVERY DAY

## 2021-04-15 ASSESSMENT — FIBROSIS 4 INDEX: FIB4 SCORE: 0.49

## 2021-04-15 NOTE — PATIENT INSTRUCTIONS
Natural treatment options for frequent headache or migraine:    1. Start magnesium oxide 400mg by mouth every night, may take extra dose if needed for headache (over the counter), hold for diarrhea  2. Start Riboflavin (Vitamin B2) 400mg by mouth every night (over the counter),may turn urine bright yellow  3. Start COQ 10, take 300mg every night. (over the counter)  4. Attempt to go to bed and get up at the same time every night  5. Eat meals on regular basis  6. Stay hydrated  7. Exercise 30 minutes daily  8. Avoid aged or smoked foods, avoid processed foods, red wine, aged cheese  9. Keep headache diary, include foods that you may have eaten.  10. Avoid overusing over the counter medications:  Do not take more than 500mg acetaminophen (tylenol), more than 4 times weekly, more frequent or larger doses are associated with medication overuse headache.

## 2021-04-15 NOTE — ASSESSMENT & PLAN NOTE
H/o hyperthyroidism diagnosed around age 14.  She had been on medication at 1 point but felt that this made her symptoms worse and has been off for more than 3 years now.  She is now having weight gain, feeling more depressed, anxious, palpitation at times

## 2021-04-15 NOTE — ASSESSMENT & PLAN NOTE
Subjective this is present she reports anxiety and depression symptoms dating back to about age 14 when her father passed away from melanoma.  She had seen a therapist at the time and had tried a medication as a teenager which she did not find to be beneficial.  She does not recall which medication she had taken.    In the last few months she has had an increase in stress which is making her symptoms worse.  Her pain structure at work is changed which has now required her to move in with her mother.  She has 2 young daughters, ages 5 and 3, and is mostly raising them independently with little support from the father.  She is tearful, overwhelmed, not sleeping well.  She is having nearly daily headaches  Depression Screening    Little interest or pleasure in doing things?  3 - nearly every day   Feeling down, depressed , or hopeless? 3 - nearly every day   Trouble falling or staying asleep, or sleeping too much?  1 - several days   Feeling tired or having little energy?  1 - several days   Poor appetite or overeating?  3 - nearly every day   Feeling bad about yourself - or that you are a failure or have let yourself or your family down? 1 - several days   Trouble concentrating on things, such as reading the newspaper or watching television? 0 - not at all   Moving or speaking so slowly that other people could have noticed.  Or the opposite - being so fidgety or restless that you have been moving around a lot more than usual?  0 - not at all   Thoughts that you would be better off dead, or of hurting yourself?  0 - not at all   Patient Health Questionnaire Score: 12       If depressive symptoms identified deferred to follow up visit unless specifically addressed in assesment and plan.    Interpretation of PHQ-9 Total Score   Score Severity   1-4 No Depression   5-9 Mild Depression   10-14 Moderate Depression   15-19 Moderately Severe Depression   20-27 Severe Depression    MEGA-7 Questionnaire    Feeling nervous,  anxious, or on edge: Nearly every day  Not being able to sop or control worrying: Nearly every day  Worrying too much about different things: Nearly every day  Trouble relaxing: Nearly every day  Being so restless that it's hard to sit still: Nearly every day  Becoming easily annoyed or irritable: Not at all  Feeling afraid as if something awful might happen: Nearly every day  Total: 18    Interpretation of MEGA 7 Total Score   Score Severity :  0-4 No Anxiety   5-9 Mild Anxiety  10-14 Moderate Anxiety  15-21 Severe Anxiety

## 2021-04-15 NOTE — ASSESSMENT & PLAN NOTE
Chronic issue, tends to be triggered by allergies which are currently uncontrolled.  She is using Claritin but still having trouble with coughing, congestion.  She has a prescription for albuterol but notes that this increases her palpitations so she avoids using it.  No recent illness including fever, chills, shortness of breath

## 2021-04-15 NOTE — PROGRESS NOTES
Subjective:     Chief Complaint   Patient presents with   • New Patient          Damari Hansen is a 25 y.o. female here today to follow up on:      Anxiety and depression    Subjective this is present she reports anxiety and depression symptoms dating back to about age 14 when her father passed away from melanoma.  She had seen a therapist at the time and had tried a medication as a teenager which she did not find to be beneficial.  She does not recall which medication she had taken.    In the last few months she has had an increase in stress which is making her symptoms worse.  Her pain structure at work is changed which has now required her to move in with her mother.  She has 2 young daughters, ages 5 and 3, and is mostly raising them independently with little support from the father.  She is tearful, overwhelmed, not sleeping well.  She is having nearly daily headaches  Depression Screening    Little interest or pleasure in doing things?  3 - nearly every day   Feeling down, depressed , or hopeless? 3 - nearly every day   Trouble falling or staying asleep, or sleeping too much?  1 - several days   Feeling tired or having little energy?  1 - several days   Poor appetite or overeating?  3 - nearly every day   Feeling bad about yourself - or that you are a failure or have let yourself or your family down? 1 - several days   Trouble concentrating on things, such as reading the newspaper or watching television? 0 - not at all   Moving or speaking so slowly that other people could have noticed.  Or the opposite - being so fidgety or restless that you have been moving around a lot more than usual?  0 - not at all   Thoughts that you would be better off dead, or of hurting yourself?  0 - not at all   Patient Health Questionnaire Score: 12       If depressive symptoms identified deferred to follow up visit unless specifically addressed in assesment and plan.    Interpretation of PHQ-9 Total Score   Score Severity   1-4  No Depression   5-9 Mild Depression   10-14 Moderate Depression   15-19 Moderately Severe Depression   20-27 Severe Depression    MEGA-7 Questionnaire    Feeling nervous, anxious, or on edge: Nearly every day  Not being able to sop or control worrying: Nearly every day  Worrying too much about different things: Nearly every day  Trouble relaxing: Nearly every day  Being so restless that it's hard to sit still: Nearly every day  Becoming easily annoyed or irritable: Not at all  Feeling afraid as if something awful might happen: Nearly every day  Total: 18    Interpretation of MEGA 7 Total Score   Score Severity :  0-4 No Anxiety   5-9 Mild Anxiety  10-14 Moderate Anxiety  15-21 Severe Anxiety        Hyperthyroidism  H/o hyperthyroidism diagnosed around age 14.  She had been on medication at 1 point but felt that this made her symptoms worse and has been off for more than 3 years now.  She is now having weight gain, feeling more depressed, anxious, palpitation at times    Mild persistent asthma  Chronic issue, tends to be triggered by allergies which are currently uncontrolled.  She is using Claritin but still having trouble with coughing, congestion.  She has a prescription for albuterol but notes that this increases her palpitations so she avoids using it.  No recent illness including fever, chills, shortness of breath       Current medicines (including changes today)  Current Outpatient Medications   Medication Sig Dispense Refill   • FLUoxetine (PROZAC) 10 MG Cap Take 1 capsule by mouth every day. 30 capsule 1   • montelukast (SINGULAIR) 10 MG Tab Take 1 tablet by mouth every day. 30 tablet 1   • loratadine (CLARITIN) 10 MG Tab Take 10 mg by mouth every day.     • promethazine (PHENERGAN) 25 MG Tab Take 1 Tab by mouth every 6 hours as needed for Nausea/Vomiting. 30 Tab 0   • SUMAtriptan (IMITREX) 50 MG Tab Take 1 Tab by mouth Once PRN for Migraine for up to 1 dose. 10 Tab 3     No current facility-administered  "medications for this visit.     She  has a past medical history of Allergy, Asthma, Hyperthyroidism, Migraine, and Patient currently pregnant (6/10/2020).    ROS included above     Objective:     /80 (BP Location: Right arm, Patient Position: Sitting, BP Cuff Size: Large adult)   Pulse 85   Temp 36.3 °C (97.4 °F) (Temporal)   Resp 20   Ht 1.753 m (5' 9\")   Wt 123 kg (270 lb 8.1 oz)   SpO2 98%  Body mass index is 39.95 kg/m².     Physical Exam:  General: Alert, oriented in no acute distress.  Eye contact is good, speech is normal, affect tearful  Lungs: clear to auscultation bilaterally, normal effort, no wheeze/ rhonchi/ rales.  CV: regular rate and rhythm, S1, S2, no murmur  Ext: no edema, color normal, vascularity normal, temperature normal    Assessment and Plan:   The following treatment plan was discussed   1. Mild intermittent asthma without complication   increase in symptoms recently secondary to environmental allergies.  We will start trial of Singulair, recheck at next visit  montelukast (SINGULAIR) 10 MG Tab   2. Hyperthyroidism   diagnosed around age 14, off medication the last few years.  Now having trouble with weight gain, depression.  Reevaluate labs prior to next visit  TSH    FREE THYROXINE    TRIIDOTHYRONINE    Comp Metabolic Panel    CBC WITH DIFFERENTIAL   3. Anxiety and depression   chronic issue with recent increase secondary to life stressors.  She had tried a medication at one point as a teenager but does not recall what she had taken.  Treatment options discussed.  We will start trial of:  FLUoxetine (PROZAC) 10 MG Cap  Risks and side effects reviewed, discussed need for sustained therapy to obtain results.  Counseling discussed, she is unsure if she would like to pursue at this time as she did not have success with counseling in the past.  Self-care including healthy diet, regular exercise, good sleep patterns, avoidance of alcohol, tobacco drugs reviewed.  She has had an " increase in her headaches which is likely secondary to stress as well.  Natural treatment options for headache prevention discussed as detailed  Natural treatment options for frequent headache or migraine:    1. Start magnesium oxide 400mg by mouth every night, may take extra dose if needed for headache (over the counter), hold for diarrhea  2. Start Riboflavin (Vitamin B2) 400mg by mouth every night (over the counter),may turn urine bright yellow  3. Start COQ 10, take 300mg every night. (over the counter)  4. Attempt to go to bed and get up at the same time every night  5. Eat meals on regular basis  6. Stay hydrated  7. Exercise 30 minutes daily  8. Avoid aged or smoked foods, avoid processed foods, red wine, aged cheese  9. Keep headache diary, include foods that you may have eaten.  10. Avoid overusing over the counter medications:  Do not take more than 500mg acetaminophen (tylenol), more than 4 times weekly, more frequent or larger doses are associated with medication overuse headache.                                   CBC WITH DIFFERENTIAL     Patient had mentioned bloating and abdominal discomfort as one of her concerns to the medical assistant but this was not discussed during our conversation.  We will readdress at next appointment    Followup: 1 month         Please note that this dictation was created using voice recognition software. I have worked with consultants from the vendor as well as technical experts from [employer] to optimize the interface. I have made every reasonable attempt to correct obvious errors, but I expect that there are errors of grammar and possibly content that I did not discover before finalizing the note.

## 2021-05-07 ENCOUNTER — HOSPITAL ENCOUNTER (OUTPATIENT)
Dept: LAB | Facility: MEDICAL CENTER | Age: 26
End: 2021-05-07
Attending: NURSE PRACTITIONER
Payer: COMMERCIAL

## 2021-05-07 DIAGNOSIS — E05.90 HYPERTHYROIDISM: ICD-10-CM

## 2021-05-07 DIAGNOSIS — F32.A ANXIETY AND DEPRESSION: ICD-10-CM

## 2021-05-07 DIAGNOSIS — F41.9 ANXIETY AND DEPRESSION: ICD-10-CM

## 2021-05-07 LAB
BASOPHILS # BLD AUTO: 0.7 % (ref 0–1.8)
BASOPHILS # BLD: 0.04 K/UL (ref 0–0.12)
EOSINOPHIL # BLD AUTO: 0.12 K/UL (ref 0–0.51)
EOSINOPHIL NFR BLD: 2.2 % (ref 0–6.9)
ERYTHROCYTE [DISTWIDTH] IN BLOOD BY AUTOMATED COUNT: 37.1 FL (ref 35.9–50)
HCT VFR BLD AUTO: 44.3 % (ref 37–47)
HGB BLD-MCNC: 14.2 G/DL (ref 12–16)
IMM GRANULOCYTES # BLD AUTO: 0.01 K/UL (ref 0–0.11)
IMM GRANULOCYTES NFR BLD AUTO: 0.2 % (ref 0–0.9)
LYMPHOCYTES # BLD AUTO: 2.51 K/UL (ref 1–4.8)
LYMPHOCYTES NFR BLD: 45.6 % (ref 22–41)
MCH RBC QN AUTO: 26.2 PG (ref 27–33)
MCHC RBC AUTO-ENTMCNC: 32.1 G/DL (ref 33.6–35)
MCV RBC AUTO: 81.6 FL (ref 81.4–97.8)
MONOCYTES # BLD AUTO: 0.48 K/UL (ref 0–0.85)
MONOCYTES NFR BLD AUTO: 8.7 % (ref 0–13.4)
NEUTROPHILS # BLD AUTO: 2.35 K/UL (ref 2–7.15)
NEUTROPHILS NFR BLD: 42.6 % (ref 44–72)
NRBC # BLD AUTO: 0 K/UL
NRBC BLD-RTO: 0 /100 WBC
PLATELET # BLD AUTO: 223 K/UL (ref 164–446)
PMV BLD AUTO: 11.7 FL (ref 9–12.9)
RBC # BLD AUTO: 5.43 M/UL (ref 4.2–5.4)
WBC # BLD AUTO: 5.5 K/UL (ref 4.8–10.8)

## 2021-05-07 PROCEDURE — 36415 COLL VENOUS BLD VENIPUNCTURE: CPT

## 2021-05-07 PROCEDURE — 80053 COMPREHEN METABOLIC PANEL: CPT

## 2021-05-07 PROCEDURE — 84439 ASSAY OF FREE THYROXINE: CPT

## 2021-05-07 PROCEDURE — 84480 ASSAY TRIIODOTHYRONINE (T3): CPT

## 2021-05-07 PROCEDURE — 84443 ASSAY THYROID STIM HORMONE: CPT

## 2021-05-07 PROCEDURE — 85025 COMPLETE CBC W/AUTO DIFF WBC: CPT

## 2021-05-08 LAB
ALBUMIN SERPL BCP-MCNC: 4.1 G/DL (ref 3.2–4.9)
ALBUMIN/GLOB SERPL: 1.4 G/DL
ALP SERPL-CCNC: 96 U/L (ref 30–99)
ALT SERPL-CCNC: 40 U/L (ref 2–50)
ANION GAP SERPL CALC-SCNC: 4 MMOL/L (ref 7–16)
AST SERPL-CCNC: 26 U/L (ref 12–45)
BILIRUB SERPL-MCNC: 0.7 MG/DL (ref 0.1–1.5)
BUN SERPL-MCNC: 11 MG/DL (ref 8–22)
CALCIUM SERPL-MCNC: 9 MG/DL (ref 8.5–10.5)
CHLORIDE SERPL-SCNC: 104 MMOL/L (ref 96–112)
CO2 SERPL-SCNC: 26 MMOL/L (ref 20–33)
CREAT SERPL-MCNC: 0.57 MG/DL (ref 0.5–1.4)
FASTING STATUS PATIENT QL REPORTED: NORMAL
GLOBULIN SER CALC-MCNC: 3 G/DL (ref 1.9–3.5)
GLUCOSE SERPL-MCNC: 88 MG/DL (ref 65–99)
POTASSIUM SERPL-SCNC: 4 MMOL/L (ref 3.6–5.5)
PROT SERPL-MCNC: 7.1 G/DL (ref 6–8.2)
SODIUM SERPL-SCNC: 134 MMOL/L (ref 135–145)
T3 SERPL-MCNC: 234 NG/DL (ref 60–181)
T4 FREE SERPL-MCNC: 2.37 NG/DL (ref 0.93–1.7)
TSH SERPL DL<=0.005 MIU/L-ACNC: <0.005 UIU/ML (ref 0.38–5.33)

## 2021-05-13 ENCOUNTER — OFFICE VISIT (OUTPATIENT)
Dept: MEDICAL GROUP | Facility: MEDICAL CENTER | Age: 26
End: 2021-05-13
Payer: COMMERCIAL

## 2021-05-13 VITALS
BODY MASS INDEX: 39.77 KG/M2 | TEMPERATURE: 98.7 F | RESPIRATION RATE: 20 BRPM | OXYGEN SATURATION: 96 % | HEIGHT: 69 IN | DIASTOLIC BLOOD PRESSURE: 76 MMHG | HEART RATE: 100 BPM | WEIGHT: 268.52 LBS | SYSTOLIC BLOOD PRESSURE: 118 MMHG

## 2021-05-13 DIAGNOSIS — F41.9 ANXIETY AND DEPRESSION: ICD-10-CM

## 2021-05-13 DIAGNOSIS — J45.30 MILD PERSISTENT ASTHMA WITHOUT COMPLICATION: ICD-10-CM

## 2021-05-13 DIAGNOSIS — E05.90 HYPERTHYROIDISM: ICD-10-CM

## 2021-05-13 DIAGNOSIS — J30.1 NON-SEASONAL ALLERGIC RHINITIS DUE TO POLLEN: ICD-10-CM

## 2021-05-13 DIAGNOSIS — F32.A ANXIETY AND DEPRESSION: ICD-10-CM

## 2021-05-13 PROCEDURE — 99214 OFFICE O/P EST MOD 30 MIN: CPT | Performed by: NURSE PRACTITIONER

## 2021-05-13 RX ORDER — FLUOXETINE HYDROCHLORIDE 20 MG/1
20 CAPSULE ORAL DAILY
Qty: 30 CAPSULE | Refills: 1 | Status: SHIPPED | OUTPATIENT
Start: 2021-05-13 | End: 2021-07-08

## 2021-05-13 RX ORDER — FLUTICASONE PROPIONATE 50 MCG
1 SPRAY, SUSPENSION (ML) NASAL DAILY
Qty: 16 G | Refills: 3 | Status: SHIPPED | OUTPATIENT
Start: 2021-05-13 | End: 2023-10-02

## 2021-05-13 RX ORDER — METHIMAZOLE 10 MG/1
10 TABLET ORAL DAILY
Qty: 30 TABLET | Refills: 1 | Status: SHIPPED | OUTPATIENT
Start: 2021-05-13 | End: 2021-06-07

## 2021-05-13 RX ORDER — METHIMAZOLE 10 MG/1
TABLET ORAL
COMMUNITY
End: 2021-05-13

## 2021-05-13 ASSESSMENT — PATIENT HEALTH QUESTIONNAIRE - PHQ9
5. POOR APPETITE OR OVEREATING: 3 - NEARLY EVERY DAY
SUM OF ALL RESPONSES TO PHQ QUESTIONS 1-9: 12
CLINICAL INTERPRETATION OF PHQ2 SCORE: 2

## 2021-05-13 ASSESSMENT — ANXIETY QUESTIONNAIRES
GAD7 TOTAL SCORE: 6
1. FEELING NERVOUS, ANXIOUS, OR ON EDGE: SEVERAL DAYS
3. WORRYING TOO MUCH ABOUT DIFFERENT THINGS: SEVERAL DAYS
5. BEING SO RESTLESS THAT IT IS HARD TO SIT STILL: NOT AT ALL
7. FEELING AFRAID AS IF SOMETHING AWFUL MIGHT HAPPEN: SEVERAL DAYS
IF YOU CHECKED OFF ANY PROBLEMS ON THIS QUESTIONNAIRE, HOW DIFFICULT HAVE THESE PROBLEMS MADE IT FOR YOU TO DO YOUR WORK, TAKE CARE OF THINGS AT HOME, OR GET ALONG WITH OTHER PEOPLE: SOMEWHAT DIFFICULT
4. TROUBLE RELAXING: SEVERAL DAYS
2. NOT BEING ABLE TO STOP OR CONTROL WORRYING: SEVERAL DAYS
6. BECOMING EASILY ANNOYED OR IRRITABLE: SEVERAL DAYS

## 2021-05-13 ASSESSMENT — FIBROSIS 4 INDEX: FIB4 SCORE: 0.5

## 2021-05-13 NOTE — ASSESSMENT & PLAN NOTE
Here today to follow-up on new prescription for fluoxetine 10 mg daily.  She feels that this is been helping with her anxiety, mood is about the same.  She does feel that the medication makes her somewhat tired, otherwise is not experiencing any side effects including nausea, increased headaches, insomnia  MEGA-7 score has improved, PHQ-9 remains the same    Depression Screening    Little interest or pleasure in doing things?  1 - several days   Feeling down, depressed , or hopeless? 1 - several days   Trouble falling or staying asleep, or sleeping too much?  3 - nearly every day   Feeling tired or having little energy?  3 - nearly every day   Poor appetite or overeating?  3 - nearly every day   Feeling bad about yourself - or that you are a failure or have let yourself or your family down? 1 - several days   Trouble concentrating on things, such as reading the newspaper or watching television? 0 - not at all   Moving or speaking so slowly that other people could have noticed.  Or the opposite - being so fidgety or restless that you have been moving around a lot more than usual?  0 - not at all   Thoughts that you would be better off dead, or of hurting yourself?  0 - not at all   Patient Health Questionnaire Score: 12       If depressive symptoms identified deferred to follow up visit unless specifically addressed in assesment and plan.    Interpretation of PHQ-9 Total Score   Score Severity   1-4 No Depression   5-9 Mild Depression   10-14 Moderate Depression   15-19 Moderately Severe Depression   20-27 Severe Depression    MEGA-7 Questionnaire    Feeling nervous, anxious, or on edge: Several days  Not being able to sop or control worrying: Several days  Worrying too much about different things: Several days  Trouble relaxing: Several days  Being so restless that it's hard to sit still: Not at all  Becoming easily annoyed or irritable: Several days  Feeling afraid as if something awful might happen: Several  days  Total: 6    Interpretation of MEGA 7 Total Score   Score Severity :  0-4 No Anxiety   5-9 Mild Anxiety  10-14 Moderate Anxiety  15-21 Severe Anxiety

## 2021-05-13 NOTE — ASSESSMENT & PLAN NOTE
She does feel that the addition of Singulair has helped some with her asthma symptoms, but continues to have uncontrolled congestion and rhinitis

## 2021-05-13 NOTE — ASSESSMENT & PLAN NOTE
Initially diagnosed around age 14, she was previously followed by endocrinology and treated with methimazole.  She states that this actually made her feel worse, she had more than 100 pound weight gain when on medication and has now been off any treatment for about 3 years.  Recent labs reviewed as noted below.  She denies unexpected weight loss, palpitation, diarrhea.  She has been anxious as discussed above, sometimes feels that her heart is racing

## 2021-05-13 NOTE — PROGRESS NOTES
Subjective:     Chief Complaint   Patient presents with   • Follow-Up     Richy Reyes     Damari Brianna Hansen is a 25 y.o. female here today to follow up on:    Anxiety and depression  Here today to follow-up on new prescription for fluoxetine 10 mg daily.  She feels that this is been helping with her anxiety, mood is about the same.  She does feel that the medication makes her somewhat tired, otherwise is not experiencing any side effects including nausea, increased headaches, insomnia  MEGA-7 score has improved, PHQ-9 remains the same    Depression Screening    Little interest or pleasure in doing things?  1 - several days   Feeling down, depressed , or hopeless? 1 - several days   Trouble falling or staying asleep, or sleeping too much?  3 - nearly every day   Feeling tired or having little energy?  3 - nearly every day   Poor appetite or overeating?  3 - nearly every day   Feeling bad about yourself - or that you are a failure or have let yourself or your family down? 1 - several days   Trouble concentrating on things, such as reading the newspaper or watching television? 0 - not at all   Moving or speaking so slowly that other people could have noticed.  Or the opposite - being so fidgety or restless that you have been moving around a lot more than usual?  0 - not at all   Thoughts that you would be better off dead, or of hurting yourself?  0 - not at all   Patient Health Questionnaire Score: 12       If depressive symptoms identified deferred to follow up visit unless specifically addressed in assesment and plan.    Interpretation of PHQ-9 Total Score   Score Severity   1-4 No Depression   5-9 Mild Depression   10-14 Moderate Depression   15-19 Moderately Severe Depression   20-27 Severe Depression    MEGA-7 Questionnaire    Feeling nervous, anxious, or on edge: Several days  Not being able to sop or control worrying: Several days  Worrying too much about different things: Several days  Trouble relaxing:  Several days  Being so restless that it's hard to sit still: Not at all  Becoming easily annoyed or irritable: Several days  Feeling afraid as if something awful might happen: Several days  Total: 6    Interpretation of MEGA 7 Total Score   Score Severity :  0-4 No Anxiety   5-9 Mild Anxiety  10-14 Moderate Anxiety  15-21 Severe Anxiety        Allergic rhinitis  Chronic issue, year round, currently having a flare.  She is consistent with Claritin, also taking Singulair.  Not currently using any nasal corticosteroids.  No recent fever, chills, pain in the face or teeth.  She is interested in allergy testing    Mild persistent asthma  She does feel that the addition of Singulair has helped some with her asthma symptoms, but continues to have uncontrolled congestion and rhinitis    Hyperthyroidism  Initially diagnosed around age 14, she was previously followed by endocrinology and treated with methimazole.  She states that this actually made her feel worse, she had more than 100 pound weight gain when on medication and has now been off any treatment for about 3 years.  Recent labs reviewed as noted below.  She denies unexpected weight loss, palpitation, diarrhea.  She has been anxious as discussed above, sometimes feels that her heart is racing       Current medicines (including changes today)  Current Outpatient Medications   Medication Sig Dispense Refill   • methimazole (TAPAZOLE) 10 MG Tab Take 1 tablet by mouth every day. 30 tablet 1   • FLUoxetine (PROZAC) 20 MG Cap Take 1 capsule by mouth every day. 30 capsule 1   • fluticasone (FLONASE) 50 MCG/ACT nasal spray Administer 1 Spray into affected nostril(S) every day. 16 g 3   • montelukast (SINGULAIR) 10 MG Tab Take 1 tablet by mouth every day. 30 tablet 1   • loratadine (CLARITIN) 10 MG Tab Take 10 mg by mouth every day.     • SUMAtriptan (IMITREX) 50 MG Tab Take 1 Tab by mouth Once PRN for Migraine for up to 1 dose. 10 Tab 3     No current facility-administered  "medications for this visit.     She  has a past medical history of Allergy, Asthma, Hyperthyroidism, Migraine, and Patient currently pregnant (6/10/2020).    ROS included above     Objective:     /76 (BP Location: Left arm, Patient Position: Sitting, BP Cuff Size: Adult)   Pulse 100   Temp 37.1 °C (98.7 °F) (Temporal)   Resp 20   Ht 1.753 m (5' 9\")   Wt 122 kg (268 lb 8.3 oz)   SpO2 96%  Body mass index is 39.65 kg/m².     Physical Exam:  General: Alert, oriented in no acute distress.  Eye contact is good, speech is normal, affect calm  HEENT: Thyroid grossly enlarged without obvious nodule or mass  Lungs: clear to auscultation bilaterally, normal effort, no wheeze/ rhonchi/ rales.  CV: regular rate and rhythm, S1, S2, no murmur  Ext: no edema, color normal, vascularity normal, temperature normal    Assessment and Plan:   The following treatment plan was discussed  1. Anxiety and depression   some improvement in MEGA 7 reading although her PHQ-9 score is still 12.  She feels that the medication is causing some drowsiness which is not typical for this prescription.  She may try taking it at night.  We will also increase dose to 20 mg daily, follow-up in about 3 weeks  FLUoxetine (PROZAC) 20 MG Cap   2. Non-seasonal allergic rhinitis  chronic congestion, rhinitis, inadequately controlled on multiple medications.  She is interested in allergy testing, referral placed  Advised trial of Flonase and daily nasal irrigation in addition to the Claritin and Singulair which she is already taking  REFERRAL TO ALLERGY    fluticasone (FLONASE) 50 MCG/ACT nasal spray   3. Hyperthyroidism   this is been a chronic issue dating back approximately 9 years, she has been off any treatment for at least 3 and years now.  We will obtain thyroid uptake scan.  Discussed importance of proper management of this issue to prevent further complication, she is agreeable to restarting methimazole.  She is fairly asymptomatic.  We will " follow up on this at next visit  NM-THYROID UPTAKE 5HR SCAN    methimazole (TAPAZOLE) 10 MG Tab    REFERRAL TO ENDOCRINOLOGY       4. Mild persistent asthma without complication   stable       Followup: 3 weeks         Please note that this dictation was created using voice recognition software. I have worked with consultants from the vendor as well as technical experts from [facility] [employer] to optimize the interface. I have made every reasonable attempt to correct obvious errors, but I expect that there are errors of grammar and possibly content that I did not discover before finalizing the note.

## 2021-05-13 NOTE — ASSESSMENT & PLAN NOTE
Chronic issue, year round, currently having a flare.  She is consistent with Claritin, also taking Singulair.  Not currently using any nasal corticosteroids.  No recent fever, chills, pain in the face or teeth.  She is interested in allergy testing

## 2021-06-02 ENCOUNTER — HOSPITAL ENCOUNTER (OUTPATIENT)
Dept: RADIOLOGY | Facility: MEDICAL CENTER | Age: 26
End: 2021-06-02
Attending: NURSE PRACTITIONER
Payer: COMMERCIAL

## 2021-06-02 DIAGNOSIS — E05.90 HYPERTHYROIDISM: ICD-10-CM

## 2021-06-02 PROCEDURE — 78014 THYROID IMAGING W/BLOOD FLOW: CPT

## 2021-06-03 ENCOUNTER — OFFICE VISIT (OUTPATIENT)
Dept: MEDICAL GROUP | Facility: MEDICAL CENTER | Age: 26
End: 2021-06-03
Payer: COMMERCIAL

## 2021-06-03 VITALS
DIASTOLIC BLOOD PRESSURE: 70 MMHG | SYSTOLIC BLOOD PRESSURE: 110 MMHG | BODY MASS INDEX: 39.18 KG/M2 | HEIGHT: 69 IN | OXYGEN SATURATION: 96 % | TEMPERATURE: 97.7 F | WEIGHT: 264.55 LBS | HEART RATE: 109 BPM | RESPIRATION RATE: 20 BRPM

## 2021-06-03 DIAGNOSIS — E05.90 HYPERTHYROIDISM: ICD-10-CM

## 2021-06-03 DIAGNOSIS — J02.9 SORE THROAT: ICD-10-CM

## 2021-06-03 DIAGNOSIS — F41.9 ANXIETY AND DEPRESSION: ICD-10-CM

## 2021-06-03 DIAGNOSIS — F32.A ANXIETY AND DEPRESSION: ICD-10-CM

## 2021-06-03 LAB
INT CON NEG: NEGATIVE
INT CON POS: POSITIVE
S PYO AG THROAT QL: NORMAL

## 2021-06-03 PROCEDURE — 87880 STREP A ASSAY W/OPTIC: CPT | Performed by: NURSE PRACTITIONER

## 2021-06-03 PROCEDURE — 99213 OFFICE O/P EST LOW 20 MIN: CPT | Performed by: NURSE PRACTITIONER

## 2021-06-03 RX ORDER — METHIMAZOLE 10 MG/1
TABLET ORAL
COMMUNITY
End: 2021-06-03

## 2021-06-03 ASSESSMENT — ANXIETY QUESTIONNAIRES
GAD7 TOTAL SCORE: 1
3. WORRYING TOO MUCH ABOUT DIFFERENT THINGS: SEVERAL DAYS
2. NOT BEING ABLE TO STOP OR CONTROL WORRYING: NOT AT ALL
4. TROUBLE RELAXING: NOT AT ALL
IF YOU CHECKED OFF ANY PROBLEMS ON THIS QUESTIONNAIRE, HOW DIFFICULT HAVE THESE PROBLEMS MADE IT FOR YOU TO DO YOUR WORK, TAKE CARE OF THINGS AT HOME, OR GET ALONG WITH OTHER PEOPLE: NOT DIFFICULT AT ALL
6. BECOMING EASILY ANNOYED OR IRRITABLE: NOT AT ALL
1. FEELING NERVOUS, ANXIOUS, OR ON EDGE: NOT AT ALL
7. FEELING AFRAID AS IF SOMETHING AWFUL MIGHT HAPPEN: NOT AT ALL
5. BEING SO RESTLESS THAT IT IS HARD TO SIT STILL: NOT AT ALL

## 2021-06-03 ASSESSMENT — PATIENT HEALTH QUESTIONNAIRE - PHQ9: CLINICAL INTERPRETATION OF PHQ2 SCORE: 0

## 2021-06-03 ASSESSMENT — FIBROSIS 4 INDEX: FIB4 SCORE: 0.5

## 2021-06-03 NOTE — LETTER
Sandra 3, 2021        RE: Damari Dille Tyrone    To Whom It May Concern;    Damari Tyrone was seen today in my office for a medical appointment. Please excuse her work absence 6/3/21 and 6/4/21 due to illness.    Sincerely,          Patito CAZARES

## 2021-06-03 NOTE — ASSESSMENT & PLAN NOTE
For detailed history please see notes dated 4/15/2021 and 5/13/2021.  Dose increased on fluoxetine from 10 to 20 mg at last visit.  This is working much better for her, her mood is improved and anxiety has been reduced substantially.  Denies any side effects related to medication including nausea, irritability, jitteriness  Depression Screening    Little interest or pleasure in doing things?  0 - not at all  Feeling down, depressed , or hopeless? 0 - not at all  Patient Health Questionnaire Score: 0    If depressive symptoms identified deferred to follow up visit unless specifically addressed in assesment and plan.      Interpretation of PHQ-9 Total Score   Score Severity   1-4 Minimal Depression   5-9 Mild Depression   10-14 Moderate Depression   15-19 Moderately Severe Depression   20-27 Severe Depression    MEGA-7 Questionnaire    Feeling nervous, anxious, or on edge: Not at all  Not being able to sop or control worrying: Not at all  Worrying too much about different things: Several days  Trouble relaxing: Not at all  Being so restless that it's hard to sit still: Not at all  Becoming easily annoyed or irritable: Not at all  Feeling afraid as if something awful might happen: Not at all  Total: 1    Interpretation of MEGA 7 Total Score   Score Severity :  0-4 No Anxiety   5-9 Mild Anxiety  10-14 Moderate Anxiety  15-21 Severe Anxiety

## 2021-06-07 DIAGNOSIS — E05.90 HYPERTHYROIDISM: ICD-10-CM

## 2021-06-07 RX ORDER — METHIMAZOLE 10 MG/1
10 TABLET ORAL DAILY
Qty: 30 TABLET | Refills: 1 | Status: SHIPPED | OUTPATIENT
Start: 2021-06-07 | End: 2021-06-30

## 2021-06-09 ENCOUNTER — TELEPHONE (OUTPATIENT)
Dept: MEDICAL GROUP | Facility: MEDICAL CENTER | Age: 26
End: 2021-06-09

## 2021-06-30 DIAGNOSIS — E05.90 HYPERTHYROIDISM: ICD-10-CM

## 2021-06-30 RX ORDER — METHIMAZOLE 10 MG/1
10 TABLET ORAL DAILY
Qty: 30 TABLET | Refills: 1 | Status: SHIPPED | OUTPATIENT
Start: 2021-06-30 | End: 2021-08-03

## 2021-06-30 NOTE — TELEPHONE ENCOUNTER
Received request via: Pharmacy    Was the patient seen in the last year in this department? Yes    Does the patient have an active prescription (recently filled or refills available) for medication(s) requested? No     Requested Prescriptions     Pending Prescriptions Disp Refills   • methimazole (TAPAZOLE) 10 MG Tab [Pharmacy Med Name: METHIMAZOLE 10 MG TABLET] 30 tablet 1     Sig: TAKE 1 TABLET BY MOUTH EVERY DAY

## 2021-07-08 DIAGNOSIS — F32.A ANXIETY AND DEPRESSION: ICD-10-CM

## 2021-07-08 DIAGNOSIS — F41.9 ANXIETY AND DEPRESSION: ICD-10-CM

## 2021-07-08 RX ORDER — FLUOXETINE HYDROCHLORIDE 20 MG/1
20 CAPSULE ORAL DAILY
Qty: 90 CAPSULE | Refills: 3 | Status: SHIPPED | OUTPATIENT
Start: 2021-07-08 | End: 2022-08-02

## 2021-08-03 DIAGNOSIS — E05.90 HYPERTHYROIDISM: ICD-10-CM

## 2021-08-03 RX ORDER — METHIMAZOLE 10 MG/1
10 TABLET ORAL DAILY
Qty: 30 TABLET | Refills: 1 | Status: SHIPPED | OUTPATIENT
Start: 2021-08-03 | End: 2022-08-02

## 2021-09-14 ENCOUNTER — OFFICE VISIT (OUTPATIENT)
Dept: MEDICAL GROUP | Facility: MEDICAL CENTER | Age: 26
End: 2021-09-14
Payer: COMMERCIAL

## 2021-09-14 ENCOUNTER — HOSPITAL ENCOUNTER (OUTPATIENT)
Dept: RADIOLOGY | Facility: MEDICAL CENTER | Age: 26
End: 2021-09-14
Attending: FAMILY MEDICINE
Payer: COMMERCIAL

## 2021-09-14 ENCOUNTER — HOSPITAL ENCOUNTER (OUTPATIENT)
Facility: MEDICAL CENTER | Age: 26
End: 2021-09-14
Attending: FAMILY MEDICINE
Payer: COMMERCIAL

## 2021-09-14 VITALS
BODY MASS INDEX: 40.7 KG/M2 | HEART RATE: 94 BPM | HEIGHT: 69 IN | TEMPERATURE: 98.4 F | WEIGHT: 274.8 LBS | OXYGEN SATURATION: 97 % | SYSTOLIC BLOOD PRESSURE: 110 MMHG | DIASTOLIC BLOOD PRESSURE: 62 MMHG

## 2021-09-14 DIAGNOSIS — R09.81 NASAL CONGESTION: ICD-10-CM

## 2021-09-14 DIAGNOSIS — R05.9 COUGH: ICD-10-CM

## 2021-09-14 LAB — COVID ORDER STATUS COVID19: NORMAL

## 2021-09-14 PROCEDURE — 99213 OFFICE O/P EST LOW 20 MIN: CPT | Performed by: FAMILY MEDICINE

## 2021-09-14 PROCEDURE — 71046 X-RAY EXAM CHEST 2 VIEWS: CPT

## 2021-09-14 PROCEDURE — U0005 INFEC AGEN DETEC AMPLI PROBE: HCPCS

## 2021-09-14 PROCEDURE — U0003 INFECTIOUS AGENT DETECTION BY NUCLEIC ACID (DNA OR RNA); SEVERE ACUTE RESPIRATORY SYNDROME CORONAVIRUS 2 (SARS-COV-2) (CORONAVIRUS DISEASE [COVID-19]), AMPLIFIED PROBE TECHNIQUE, MAKING USE OF HIGH THROUGHPUT TECHNOLOGIES AS DESCRIBED BY CMS-2020-01-R: HCPCS

## 2021-09-14 RX ORDER — DOXYCYCLINE HYCLATE 100 MG
100 TABLET ORAL 2 TIMES DAILY
Qty: 14 TABLET | Refills: 0 | Status: SHIPPED | OUTPATIENT
Start: 2021-09-14 | End: 2021-09-21

## 2021-09-14 ASSESSMENT — ENCOUNTER SYMPTOMS
TINGLING: 0
CONSTIPATION: 0
SHORTNESS OF BREATH: 1
ABDOMINAL PAIN: 0
NERVOUS/ANXIOUS: 0
VOMITING: 0
DIZZINESS: 0
FEVER: 0
NAUSEA: 0
COUGH: 1
DEPRESSION: 0
BLURRED VISION: 0
SORE THROAT: 0
MYALGIAS: 0
BRUISES/BLEEDS EASILY: 0
SPUTUM PRODUCTION: 1
CHILLS: 0
WEAKNESS: 1
WEIGHT LOSS: 0
DOUBLE VISION: 0
DIARRHEA: 0
PALPITATIONS: 0

## 2021-09-14 ASSESSMENT — FIBROSIS 4 INDEX: FIB4 SCORE: 0.5

## 2021-09-14 NOTE — PROGRESS NOTES
Damari Hansen is a pleasant 25 y.o. female here for   Chief Complaint   Patient presents with   • Cold Exposure      HPI:   Nasal congestion  Nasal congestion started approximately 3 weeks ago along with a cough.  Positive for shortness of breath, body aches and chest wall pain is aggravated with coughing.  She has tried over-the-counter cough medications but nothing seems to be helping her cough or her nasal congestion.  She does utilize a prescription strength nasal spray, this has not been very helpful for her.  Denies fevers, sore throat, loss of taste or smell, diarrhea, or headaches.  Patient did have a Covid test in her second week of illness which came back negative.  She states her symptoms have progressively worsened.    Cough  Started approximately 3 weeks ago along with nasal congestion.  Patient reports that she feels as though her cough is progressively getting worse and now more productive than it was before.  Reporting increasing chest discomfort during her coughing fits.  Positive history of asthma, uses her inhaler for when her cough and shortness of breath get really bad.      Current Medicines (including changes today)  Current Outpatient Medications   Medication Sig Dispense Refill   • doxycycline (VIBRAMYCIN) 100 MG Tab Take 1 Tablet by mouth 2 times a day for 7 days. 14 Tablet 0   • methimazole (TAPAZOLE) 10 MG Tab TAKE 1 TABLET BY MOUTH EVERY DAY 30 tablet 1   • FLUoxetine (PROZAC) 20 MG Cap TAKE 1 CAPSULE BY MOUTH EVERY DAY 90 capsule 3   • montelukast (SINGULAIR) 10 MG Tab TAKE 1 TABLET BY MOUTH EVERY DAY 30 tablet 11   • fluticasone (FLONASE) 50 MCG/ACT nasal spray Administer 1 Spray into affected nostril(S) every day. 16 g 3   • loratadine (CLARITIN) 10 MG Tab Take 10 mg by mouth every day.     • SUMAtriptan (IMITREX) 50 MG Tab Take 1 Tab by mouth Once PRN for Migraine for up to 1 dose. 10 Tab 3     No current facility-administered medications for this visit.     Past Medical/  "Surgical History  She  has a past medical history of Allergy, Asthma, Hyperthyroidism, Migraine, and Patient currently pregnant (6/10/2020).  She  has a past surgical history that includes tonsillectomy.    Review of Systems   Constitutional: Positive for malaise/fatigue. Negative for chills, fever and weight loss.   HENT: Positive for congestion. Negative for hearing loss and sore throat.    Eyes: Negative for blurred vision and double vision.   Respiratory: Positive for cough, sputum production and shortness of breath.    Cardiovascular: Negative for chest pain, palpitations and leg swelling.   Gastrointestinal: Negative for abdominal pain, constipation, diarrhea, nausea and vomiting.   Musculoskeletal: Negative for myalgias.   Skin: Negative for rash.   Neurological: Positive for weakness. Negative for dizziness and tingling.   Endo/Heme/Allergies: Does not bruise/bleed easily.   Psychiatric/Behavioral: Negative for depression. The patient is not nervous/anxious.          Objective:     /62 (BP Location: Left arm, Patient Position: Sitting, BP Cuff Size: Adult)   Pulse 94   Temp 36.9 °C (98.4 °F) (Temporal)   Ht 1.753 m (5' 9\")   Wt 125 kg (274 lb 12.8 oz)   SpO2 97%  Body mass index is 40.58 kg/m².    Physical Exam  Constitutional:       General: She is not in acute distress.  HENT:      Head: Normocephalic and atraumatic.      Right Ear: External ear normal.      Left Ear: External ear normal.      Nose: Congestion and rhinorrhea present. No nasal tenderness. Rhinorrhea is clear.      Right Turbinates: Not enlarged.      Left Turbinates: Swollen.      Right Sinus: No maxillary sinus tenderness or frontal sinus tenderness.      Left Sinus: No maxillary sinus tenderness or frontal sinus tenderness.      Mouth/Throat:      Mouth: Mucous membranes are moist.      Pharynx: Posterior oropharyngeal erythema present. No pharyngeal swelling.      Tonsils: No tonsillar exudate.   Eyes:      Conjunctiva/sclera: " Conjunctivae normal.      Pupils: Pupils are equal, round, and reactive to light.   Cardiovascular:      Rate and Rhythm: Normal rate and regular rhythm.      Heart sounds: No murmur heard.   No friction rub. No gallop.    Pulmonary:      Effort: Pulmonary effort is normal.      Breath sounds: Normal breath sounds. No wheezing or rales.   Abdominal:      General: Bowel sounds are normal.      Palpations: Abdomen is soft.      Tenderness: There is no abdominal tenderness.   Musculoskeletal:      Cervical back: Normal range of motion and neck supple.      Right lower leg: No edema.      Left lower leg: No edema.   Skin:     General: Skin is warm and dry.      Findings: No rash.   Neurological:      Mental Status: She is alert and oriented to person, place, and time.      Gait: Gait is intact.   Psychiatric:         Mood and Affect: Affect normal.        Imaging:  Recent imaging to review    Labs  No recent labs to review  Assessment and Plan:   The following treatment plan was discussed    1. Nasal congestion  Acute.  Due to patient's report of prolonged nasal congestion despite sinus pressure we will go ahead and get her started on an antibiotic, doxycycline 100 mg twice daily x7 days.  We will go ahead and repeat her Covid test result.  I discussed with the patient about the importance of staying orally hydrated, the use of over-the-counter medications to relieve any discomfort she may be experiencing.  I did recommend that she use Afrin nasal spray to decrease some of the swelling.  I did recommend that if she is not experiencing any relief in the next 48 to 72 hours she should set up a new appointment with Kristy for follow-up.  - doxycycline (VIBRAMYCIN) 100 MG Tab; Take 1 Tablet by mouth 2 times a day for 7 days.  Dispense: 14 Tablet; Refill: 0  - SARS-CoV-2 PCR (24 hour In-House): Collect NP swab in [facility]; Future    2. Cough  Acute.  Due to patient's worsening of her cough and no improvement in her overall  symptoms we will go ahead and obtain a chest x-ray to rule out possible pneumonia.  - DX-CHEST-2 VIEWS; Future  - SARS-CoV-2 PCR (24 hour In-House): Collect NP swab in [facility]; Future      Followup: No follow-ups on file.    I have placed Covid swab orders.  The MA is preforming Covid swab orders under the direction of Dr. Segura    Please note that this dictation was created using voice recognition software. I have made every reasonable attempt to correct obvious errors, but I expect that there are errors of grammar and possibly content that I did not discover before finalizing the note.

## 2021-09-14 NOTE — ASSESSMENT & PLAN NOTE
Nasal congestion started approximately 3 weeks ago along with a cough.  Positive for shortness of breath, body aches and chest wall pain is aggravated with coughing.  She has tried over-the-counter cough medications but nothing seems to be helping her cough or her nasal congestion.  She does utilize a prescription strength nasal spray, this has not been very helpful for her.  Denies fevers, sore throat, loss of taste or smell, diarrhea, or headaches.  Patient did have a Covid test in her second week of illness which came back negative.  She states her symptoms have progressively worsened.

## 2021-09-14 NOTE — ASSESSMENT & PLAN NOTE
Started approximately 3 weeks ago along with nasal congestion.  Patient reports that she feels as though her cough is progressively getting worse and now more productive than it was before.  Reporting increasing chest discomfort during her coughing fits.  Positive history of asthma, uses her inhaler for when her cough and shortness of breath get really bad.

## 2021-09-15 LAB
SARS-COV-2 RNA RESP QL NAA+PROBE: NOTDETECTED
SPECIMEN SOURCE: NORMAL

## 2022-03-30 ENCOUNTER — APPOINTMENT (OUTPATIENT)
Dept: RADIOLOGY | Facility: MEDICAL CENTER | Age: 27
End: 2022-03-30
Payer: COMMERCIAL

## 2022-03-30 ENCOUNTER — HOSPITAL ENCOUNTER (EMERGENCY)
Facility: MEDICAL CENTER | Age: 27
End: 2022-03-30
Attending: EMERGENCY MEDICINE
Payer: COMMERCIAL

## 2022-03-30 ENCOUNTER — APPOINTMENT (OUTPATIENT)
Dept: RADIOLOGY | Facility: MEDICAL CENTER | Age: 27
End: 2022-03-30
Attending: EMERGENCY MEDICINE
Payer: COMMERCIAL

## 2022-03-30 ENCOUNTER — OFFICE VISIT (OUTPATIENT)
Dept: URGENT CARE | Facility: CLINIC | Age: 27
End: 2022-03-30
Payer: COMMERCIAL

## 2022-03-30 VITALS
TEMPERATURE: 98.7 F | SYSTOLIC BLOOD PRESSURE: 111 MMHG | BODY MASS INDEX: 43.4 KG/M2 | RESPIRATION RATE: 19 BRPM | HEART RATE: 116 BPM | HEIGHT: 69 IN | WEIGHT: 293 LBS | OXYGEN SATURATION: 95 % | DIASTOLIC BLOOD PRESSURE: 59 MMHG

## 2022-03-30 VITALS
DIASTOLIC BLOOD PRESSURE: 84 MMHG | SYSTOLIC BLOOD PRESSURE: 128 MMHG | RESPIRATION RATE: 28 BRPM | WEIGHT: 293 LBS | TEMPERATURE: 101.2 F | BODY MASS INDEX: 43.4 KG/M2 | HEIGHT: 69 IN | HEART RATE: 136 BPM | OXYGEN SATURATION: 96 %

## 2022-03-30 DIAGNOSIS — K08.89 PAIN, DENTAL: ICD-10-CM

## 2022-03-30 DIAGNOSIS — A41.9 SEPSIS, DUE TO UNSPECIFIED ORGANISM, UNSPECIFIED WHETHER ACUTE ORGAN DYSFUNCTION PRESENT (HCC): ICD-10-CM

## 2022-03-30 DIAGNOSIS — B34.9 VIRAL SYNDROME: ICD-10-CM

## 2022-03-30 DIAGNOSIS — R50.9 FEVER, UNSPECIFIED FEVER CAUSE: ICD-10-CM

## 2022-03-30 DIAGNOSIS — R65.10 SIRS (SYSTEMIC INFLAMMATORY RESPONSE SYNDROME) (HCC): ICD-10-CM

## 2022-03-30 LAB
ALBUMIN SERPL BCP-MCNC: 4.4 G/DL (ref 3.2–4.9)
ALBUMIN/GLOB SERPL: 1.3 G/DL
ALP SERPL-CCNC: 89 U/L (ref 30–99)
ALT SERPL-CCNC: 30 U/L (ref 2–50)
ANION GAP SERPL CALC-SCNC: 16 MMOL/L (ref 7–16)
APPEARANCE UR: CLEAR
AST SERPL-CCNC: 22 U/L (ref 12–45)
BASOPHILS # BLD AUTO: 0.6 % (ref 0–1.8)
BASOPHILS # BLD: 0.06 K/UL (ref 0–0.12)
BILIRUB SERPL-MCNC: 0.5 MG/DL (ref 0.1–1.5)
BILIRUB UR QL STRIP.AUTO: NEGATIVE
BUN SERPL-MCNC: 10 MG/DL (ref 8–22)
CALCIUM SERPL-MCNC: 9.4 MG/DL (ref 8.4–10.2)
CHLORIDE SERPL-SCNC: 102 MMOL/L (ref 96–112)
CO2 SERPL-SCNC: 18 MMOL/L (ref 20–33)
COLOR UR: YELLOW
CREAT SERPL-MCNC: 0.78 MG/DL (ref 0.5–1.4)
EOSINOPHIL # BLD AUTO: 0.16 K/UL (ref 0–0.51)
EOSINOPHIL NFR BLD: 1.6 % (ref 0–6.9)
ERYTHROCYTE [DISTWIDTH] IN BLOOD BY AUTOMATED COUNT: 38.4 FL (ref 35.9–50)
FLUAV RNA SPEC QL NAA+PROBE: NEGATIVE
FLUBV RNA SPEC QL NAA+PROBE: NEGATIVE
GFR SERPLBLD CREATININE-BSD FMLA CKD-EPI: 106 ML/MIN/1.73 M 2
GLOBULIN SER CALC-MCNC: 3.5 G/DL (ref 1.9–3.5)
GLUCOSE SERPL-MCNC: 112 MG/DL (ref 65–99)
GLUCOSE UR STRIP.AUTO-MCNC: NEGATIVE MG/DL
HCG SERPL QL: NEGATIVE
HCT VFR BLD AUTO: 43.8 % (ref 37–47)
HGB BLD-MCNC: 14.7 G/DL (ref 12–16)
IMM GRANULOCYTES # BLD AUTO: 0.03 K/UL (ref 0–0.11)
IMM GRANULOCYTES NFR BLD AUTO: 0.3 % (ref 0–0.9)
KETONES UR STRIP.AUTO-MCNC: NEGATIVE MG/DL
LACTATE BLD-SCNC: 2.1 MMOL/L (ref 0.5–2)
LEUKOCYTE ESTERASE UR QL STRIP.AUTO: NEGATIVE
LYMPHOCYTES # BLD AUTO: 0.91 K/UL (ref 1–4.8)
LYMPHOCYTES NFR BLD: 9.2 % (ref 22–41)
MCH RBC QN AUTO: 27.7 PG (ref 27–33)
MCHC RBC AUTO-ENTMCNC: 33.6 G/DL (ref 33.6–35)
MCV RBC AUTO: 82.5 FL (ref 81.4–97.8)
MICRO URNS: NORMAL
MONOCYTES # BLD AUTO: 0.71 K/UL (ref 0–0.85)
MONOCYTES NFR BLD AUTO: 7.2 % (ref 0–13.4)
NEUTROPHILS # BLD AUTO: 8.03 K/UL (ref 2–7.15)
NEUTROPHILS NFR BLD: 81.1 % (ref 44–72)
NITRITE UR QL STRIP.AUTO: NEGATIVE
NRBC # BLD AUTO: 0 K/UL
NRBC BLD-RTO: 0 /100 WBC
PH UR STRIP.AUTO: 7.5 [PH] (ref 5–8)
PLATELET # BLD AUTO: 221 K/UL (ref 164–446)
PMV BLD AUTO: 10.4 FL (ref 9–12.9)
POTASSIUM SERPL-SCNC: 4.2 MMOL/L (ref 3.6–5.5)
PROCALCITONIN SERPL-MCNC: 0.06 NG/ML
PROT SERPL-MCNC: 7.9 G/DL (ref 6–8.2)
PROT UR QL STRIP: NEGATIVE MG/DL
RBC # BLD AUTO: 5.31 M/UL (ref 4.2–5.4)
RBC UR QL AUTO: NEGATIVE
RSV RNA SPEC QL NAA+PROBE: NEGATIVE
SARS-COV-2 RNA RESP QL NAA+PROBE: NOTDETECTED
SODIUM SERPL-SCNC: 136 MMOL/L (ref 135–145)
SP GR UR STRIP.AUTO: 1.01
SPECIMEN SOURCE: NORMAL
T4 FREE SERPL-MCNC: 1.27 NG/DL (ref 0.93–1.7)
TSH SERPL DL<=0.005 MIU/L-ACNC: 0.05 UIU/ML (ref 0.38–5.33)
WBC # BLD AUTO: 9.9 K/UL (ref 4.8–10.8)

## 2022-03-30 PROCEDURE — 84439 ASSAY OF FREE THYROXINE: CPT

## 2022-03-30 PROCEDURE — 84145 PROCALCITONIN (PCT): CPT

## 2022-03-30 PROCEDURE — 96375 TX/PRO/DX INJ NEW DRUG ADDON: CPT

## 2022-03-30 PROCEDURE — 87040 BLOOD CULTURE FOR BACTERIA: CPT | Mod: 91

## 2022-03-30 PROCEDURE — 700111 HCHG RX REV CODE 636 W/ 250 OVERRIDE (IP): Performed by: EMERGENCY MEDICINE

## 2022-03-30 PROCEDURE — 700101 HCHG RX REV CODE 250: Performed by: EMERGENCY MEDICINE

## 2022-03-30 PROCEDURE — 87086 URINE CULTURE/COLONY COUNT: CPT

## 2022-03-30 PROCEDURE — 96365 THER/PROPH/DIAG IV INF INIT: CPT

## 2022-03-30 PROCEDURE — 71045 X-RAY EXAM CHEST 1 VIEW: CPT

## 2022-03-30 PROCEDURE — 99284 EMERGENCY DEPT VISIT MOD MDM: CPT

## 2022-03-30 PROCEDURE — 83605 ASSAY OF LACTIC ACID: CPT

## 2022-03-30 PROCEDURE — 700105 HCHG RX REV CODE 258: Performed by: EMERGENCY MEDICINE

## 2022-03-30 PROCEDURE — 700102 HCHG RX REV CODE 250 W/ 637 OVERRIDE(OP): Performed by: EMERGENCY MEDICINE

## 2022-03-30 PROCEDURE — 99215 OFFICE O/P EST HI 40 MIN: CPT | Performed by: NURSE PRACTITIONER

## 2022-03-30 PROCEDURE — 36415 COLL VENOUS BLD VENIPUNCTURE: CPT

## 2022-03-30 PROCEDURE — 81003 URINALYSIS AUTO W/O SCOPE: CPT

## 2022-03-30 PROCEDURE — 84443 ASSAY THYROID STIM HORMONE: CPT

## 2022-03-30 PROCEDURE — A9270 NON-COVERED ITEM OR SERVICE: HCPCS | Performed by: EMERGENCY MEDICINE

## 2022-03-30 PROCEDURE — 0241U HCHG SARS-COV-2 COVID-19 NFCT DS RESP RNA 4 TRGT MIC: CPT

## 2022-03-30 PROCEDURE — 84703 CHORIONIC GONADOTROPIN ASSAY: CPT

## 2022-03-30 PROCEDURE — 80053 COMPREHEN METABOLIC PANEL: CPT

## 2022-03-30 PROCEDURE — 85025 COMPLETE CBC W/AUTO DIFF WBC: CPT

## 2022-03-30 PROCEDURE — 96376 TX/PRO/DX INJ SAME DRUG ADON: CPT

## 2022-03-30 PROCEDURE — C9803 HOPD COVID-19 SPEC COLLECT: HCPCS | Performed by: EMERGENCY MEDICINE

## 2022-03-30 RX ORDER — IBUPROFEN 600 MG/1
600 TABLET ORAL ONCE
Status: COMPLETED | OUTPATIENT
Start: 2022-03-30 | End: 2022-03-30

## 2022-03-30 RX ORDER — CLINDAMYCIN HYDROCHLORIDE 300 MG/1
300 CAPSULE ORAL 3 TIMES DAILY
Qty: 21 CAPSULE | Refills: 0 | Status: SHIPPED | OUTPATIENT
Start: 2022-03-30 | End: 2022-04-06

## 2022-03-30 RX ORDER — SODIUM CHLORIDE, SODIUM LACTATE, POTASSIUM CHLORIDE, CALCIUM CHLORIDE 600; 310; 30; 20 MG/100ML; MG/100ML; MG/100ML; MG/100ML
1000 INJECTION, SOLUTION INTRAVENOUS ONCE
Status: COMPLETED | OUTPATIENT
Start: 2022-03-30 | End: 2022-03-30

## 2022-03-30 RX ORDER — ONDANSETRON 2 MG/ML
4 INJECTION INTRAMUSCULAR; INTRAVENOUS ONCE
Status: COMPLETED | OUTPATIENT
Start: 2022-03-30 | End: 2022-03-30

## 2022-03-30 RX ORDER — HYDROMORPHONE HYDROCHLORIDE 1 MG/ML
0.5 INJECTION, SOLUTION INTRAMUSCULAR; INTRAVENOUS; SUBCUTANEOUS ONCE
Status: COMPLETED | OUTPATIENT
Start: 2022-03-30 | End: 2022-03-30

## 2022-03-30 RX ORDER — ACETAMINOPHEN 500 MG
1000 TABLET ORAL ONCE
Status: COMPLETED | OUTPATIENT
Start: 2022-03-30 | End: 2022-03-30

## 2022-03-30 RX ORDER — KETOROLAC TROMETHAMINE 30 MG/ML
15 INJECTION, SOLUTION INTRAMUSCULAR; INTRAVENOUS ONCE
Status: COMPLETED | OUTPATIENT
Start: 2022-03-30 | End: 2022-03-30

## 2022-03-30 RX ORDER — CLINDAMYCIN PHOSPHATE 600 MG/50ML
600 INJECTION, SOLUTION INTRAVENOUS ONCE
Status: COMPLETED | OUTPATIENT
Start: 2022-03-30 | End: 2022-03-30

## 2022-03-30 RX ADMIN — ONDANSETRON 4 MG: 2 INJECTION INTRAMUSCULAR; INTRAVENOUS at 22:43

## 2022-03-30 RX ADMIN — IBUPROFEN 600 MG: 600 TABLET ORAL at 20:42

## 2022-03-30 RX ADMIN — HYDROMORPHONE HYDROCHLORIDE 0.5 MG: 1 INJECTION, SOLUTION INTRAMUSCULAR; INTRAVENOUS; SUBCUTANEOUS at 19:23

## 2022-03-30 RX ADMIN — SODIUM CHLORIDE, POTASSIUM CHLORIDE, SODIUM LACTATE AND CALCIUM CHLORIDE 1000 ML: 600; 310; 30; 20 INJECTION, SOLUTION INTRAVENOUS at 19:22

## 2022-03-30 RX ADMIN — KETOROLAC TROMETHAMINE 15 MG: 30 INJECTION, SOLUTION INTRAMUSCULAR at 22:42

## 2022-03-30 RX ADMIN — ONDANSETRON 4 MG: 2 INJECTION INTRAMUSCULAR; INTRAVENOUS at 19:22

## 2022-03-30 RX ADMIN — CLINDAMYCIN IN 5 PERCENT DEXTROSE 600 MG: 12 INJECTION, SOLUTION INTRAVENOUS at 20:42

## 2022-03-30 RX ADMIN — SODIUM CHLORIDE, POTASSIUM CHLORIDE, SODIUM LACTATE AND CALCIUM CHLORIDE 1000 ML: 600; 310; 30; 20 INJECTION, SOLUTION INTRAVENOUS at 22:09

## 2022-03-30 RX ADMIN — Medication 1000 MG: at 18:21

## 2022-03-30 ASSESSMENT — PAIN DESCRIPTION - PAIN TYPE: TYPE: ACUTE PAIN

## 2022-03-30 ASSESSMENT — ENCOUNTER SYMPTOMS
FEVER: 1
SINUS PAIN: 1
COUGH: 1

## 2022-03-30 ASSESSMENT — FIBROSIS 4 INDEX
FIB4 SCORE: 0.52
FIB4 SCORE: 0.52

## 2022-03-31 NOTE — ED NOTES
Pt provided with discharge paper and follow up care instructions. Pt denies questions about new medications. Pt to ambulate out of Er with parent.

## 2022-03-31 NOTE — ED PROVIDER NOTES
ED Provider Note    CHIEF COMPLAINT  Chief Complaint   Patient presents with   • Fever   • Shortness of Breath   • Dental Pain   • Dizziness   • Tailbone Pain     S/P fall approx 1 weeks ago       HPI  Damari Hansen is a 26 y.o. female who presents for evaluation of reported fever runny nose cough left upper second molar pain.  The patient denies any chest pain leg swelling or hemoptysis.  She has been battling a toothache for several days but also developed runny nose cough congestion with low-grade fever over the past few days as well.  No known sick contacts.  She denies productive cough.  She is in the process of getting set up with a dentist.  She has known history of thyroid disease for which she has declined therapy.  She denies pregnancy.  No associated strokelike symptoms such as numbness weakness tingling to the arms legs or face    REVIEW OF SYSTEMS  See HPI for further details.  Positive for low-grade fever cough congestion dental pain all other systems are negative.     PAST MEDICAL HISTORY  Past Medical History:   Diagnosis Date   • Patient currently pregnant 6/10/2020   • Allergy     year round   • Asthma    • Hyperthyroidism     hyperthyroid    • Migraine        FAMILY HISTORY  noncontributory   SOCIAL HISTORY  Social History     Socioeconomic History   • Marital status: Single   Tobacco Use   • Smoking status: Never Smoker   • Smokeless tobacco: Never Used   Vaping Use   • Vaping Use: Never used   Substance and Sexual Activity   • Alcohol use: Yes     Comment: rare   • Drug use: No   • Sexual activity: Yes     Partners: Male     Birth control/protection: I.U.D.   Denies sexual activity    SURGICAL HISTORY  Past Surgical History:   Procedure Laterality Date   • TONSILLECTOMY         CURRENT MEDICATIONS  No current facility-administered medications for this encounter.    Current Outpatient Medications:   •  methimazole (TAPAZOLE) 10 MG Tab, TAKE 1 TABLET BY MOUTH EVERY DAY (Patient not taking:  "Reported on 3/30/2022), Disp: 30 tablet, Rfl: 1  •  FLUoxetine (PROZAC) 20 MG Cap, TAKE 1 CAPSULE BY MOUTH EVERY DAY (Patient not taking: Reported on 3/30/2022), Disp: 90 capsule, Rfl: 3  •  montelukast (SINGULAIR) 10 MG Tab, TAKE 1 TABLET BY MOUTH EVERY DAY (Patient not taking: Reported on 3/30/2022), Disp: 30 tablet, Rfl: 11  •  fluticasone (FLONASE) 50 MCG/ACT nasal spray, Administer 1 Spray into affected nostril(S) every day., Disp: 16 g, Rfl: 3  •  loratadine (CLARITIN) 10 MG Tab, Take 10 mg by mouth every day., Disp: , Rfl:   •  SUMAtriptan (IMITREX) 50 MG Tab, Take 1 Tab by mouth Once PRN for Migraine for up to 1 dose. (Patient not taking: Reported on 3/30/2022), Disp: 10 Tab, Rfl: 3      ALLERGIES  Allergies   Allergen Reactions   • Amoxicillin Hives     Rxn - 2016       PHYSICAL EXAM  VITAL SIGNS: /59   Pulse (!) 116   Temp (!) 38.2 °C (100.7 °F) (Temporal)   Resp 19   Ht 1.753 m (5' 9\")   Wt (!) 139 kg (305 lb 12.5 oz)   SpO2 95%   BMI 45.16 kg/m²       Constitutional: Well developed, Well nourished, No acute distress, Non-toxic appearance.   HENT: Normocephalic, Atraumatic, Bilateral external ears normal, Oropharynx moist, No oral exudates, Nose normal.  Widespread poor dentition with focal tenderness noted in the left upper second molar where there is an exposed cavity no visualized or palpated abscess  Eyes: PERRLA, EOMI, Conjunctiva normal, No discharge.   Neck: Normal range of motion, No tenderness, Supple, No stridor.   Cardiovascular: Mild tachycardia normal rhythm, No murmurs, No rubs, No gallops.   Thorax & Lungs: Minimal scattered rhonchi no wheezing, No chest tenderness.   Abdomen: Bowel sounds normal, Soft, No tenderness, No masses, No pulsatile masses.   Skin: Warm, Dry, No erythema, No rash.   Back: No tenderness, No CVA tenderness.   Extremities: Intact distal pulses, No edema, No tenderness, No cyanosis, No clubbing.   Neurologic: Alert & oriented x 3, Normal motor function, " Normal sensory function, No focal deficits noted.   Psychiatric: Anxious.     DX-CHEST-PORTABLE (1 VIEW)   Final Result         1.  No acute cardiopulmonary disease.        Results for orders placed or performed during the hospital encounter of 03/30/22   CBC WITH DIFFERENTIAL   Result Value Ref Range    WBC 9.9 4.8 - 10.8 K/uL    RBC 5.31 4.20 - 5.40 M/uL    Hemoglobin 14.7 12.0 - 16.0 g/dL    Hematocrit 43.8 37.0 - 47.0 %    MCV 82.5 81.4 - 97.8 fL    MCH 27.7 27.0 - 33.0 pg    MCHC 33.6 33.6 - 35.0 g/dL    RDW 38.4 35.9 - 50.0 fL    Platelet Count 221 164 - 446 K/uL    MPV 10.4 9.0 - 12.9 fL    Neutrophils-Polys 81.10 (H) 44.00 - 72.00 %    Lymphocytes 9.20 (L) 22.00 - 41.00 %    Monocytes 7.20 0.00 - 13.40 %    Eosinophils 1.60 0.00 - 6.90 %    Basophils 0.60 0.00 - 1.80 %    Immature Granulocytes 0.30 0.00 - 0.90 %    Nucleated RBC 0.00 /100 WBC    Neutrophils (Absolute) 8.03 (H) 2.00 - 7.15 K/uL    Lymphs (Absolute) 0.91 (L) 1.00 - 4.80 K/uL    Monos (Absolute) 0.71 0.00 - 0.85 K/uL    Eos (Absolute) 0.16 0.00 - 0.51 K/uL    Baso (Absolute) 0.06 0.00 - 0.12 K/uL    Immature Granulocytes (abs) 0.03 0.00 - 0.11 K/uL    NRBC (Absolute) 0.00 K/uL   COMP METABOLIC PANEL   Result Value Ref Range    Sodium 136 135 - 145 mmol/L    Potassium 4.2 3.6 - 5.5 mmol/L    Chloride 102 96 - 112 mmol/L    Co2 18 (L) 20 - 33 mmol/L    Anion Gap 16.0 7.0 - 16.0    Glucose 112 (H) 65 - 99 mg/dL    Bun 10 8 - 22 mg/dL    Creatinine 0.78 0.50 - 1.40 mg/dL    Calcium 9.4 8.4 - 10.2 mg/dL    AST(SGOT) 22 12 - 45 U/L    ALT(SGPT) 30 2 - 50 U/L    Alkaline Phosphatase 89 30 - 99 U/L    Total Bilirubin 0.5 0.1 - 1.5 mg/dL    Albumin 4.4 3.2 - 4.9 g/dL    Total Protein 7.9 6.0 - 8.2 g/dL    Globulin 3.5 1.9 - 3.5 g/dL    A-G Ratio 1.3 g/dL   URINALYSIS    Specimen: Urine   Result Value Ref Range    Color Yellow     Character Clear     Specific Gravity 1.010 <1.035    Ph 7.5 5.0 - 8.0    Glucose Negative Negative mg/dL    Ketones Negative  Negative mg/dL    Protein Negative Negative mg/dL    Bilirubin Negative Negative    Nitrite Negative Negative    Leukocyte Esterase Negative Negative    Occult Blood Negative Negative    Micro Urine Req see below    LACTIC ACID   Result Value Ref Range    Lactic Acid 2.1 (H) 0.5 - 2.0 mmol/L   COV-2, FLU A/B, AND RSV BY PCR (2-4 HOURS CEPHEID): Collect NP swab in VTM    Specimen: Nasopharyngeal; Respirate   Result Value Ref Range    Influenza virus A RNA Negative Negative    Influenza virus B, PCR Negative Negative    RSV, PCR Negative Negative    SARS-CoV-2 by PCR NotDetected     SARS-CoV-2 Source NP Swab    HCG QUAL SERUM   Result Value Ref Range    Beta-Hcg Qualitative Serum Negative Negative   TSH WITH REFLEX TO FT4   Result Value Ref Range    TSH 0.050 (L) 0.380 - 5.330 uIU/mL   PROCALCITONIN   Result Value Ref Range    Procalcitonin 0.06 <0.25 ng/mL   ESTIMATED GFR   Result Value Ref Range    GFR (CKD-EPI) 106 >60 mL/min/1.73 m 2   FREE THYROXINE   Result Value Ref Range    Free T-4 1.27 0.93 - 1.70 ng/dL        COURSE & MEDICAL DECISION MAKING  Pertinent Labs & Imaging studies reviewed. (See chart for details)  Presented here with SIRS.  Septic protocol was initiated.  Patient had fever and mild tachycardia.  Lactic acid is reassuring at less than 3.  She has no leukocytosis or bandemia and Covid and influenza tests are all negative.  Chest x-ray is clear.  Procalcitonin level is elevated.  Blood cultures were performed.  The patient was given antipyretics as well as IV fluids.  I administered clindamycin due to her extensive antibiotic allergies for presumable dental infection there is no clear evidence of any dental abscess.  Urinalysis and the rest of her septic work-up is reassuring.  After treatment of IV fluids with heart rate temperature came down and blood pressure improved and remained stable.  Patient the patient is otherwise healthy and likely does have an undiagnosable viral process.  She will  require antibiotics for dental infection.  Return precautions have been reviewed    FINAL IMPRESSION  1.  SIRS  2.  Viral syndrome  3.  Dental infection         Electronically signed by: Beny Lamb M.D., 3/30/2022 10:59 PM

## 2022-03-31 NOTE — PROGRESS NOTES
Subjective     Damari Hansen is a 26 y.o. female who presents with Fever (x1day), Nasal Congestion (x1day), Cough (x1day), and Dental Pain (Left side, pt has a root canal scheduled for Tuesday, swollen, 5/10 pain when pressing on face )            Fever   This is a new problem. Episode onset: pt reports new onset of dental pain that started 2 days ago to the upper left side of her mouth. She reports new onset of left sided facial pain today below her left eye. new fevers today, tmax 104F at 1200. +HA and sinus congestion. The problem has been unchanged. Associated symptoms include congestion and coughing. Associated symptoms comments: Pt reports she knows she has a dental infection to that upper left tooth and she is scheduled for a root canal next week. She has tried NSAIDs for the symptoms.       Review of Systems   Constitutional: Positive for fever.   HENT: Positive for congestion and sinus pain.    Respiratory: Positive for cough.    All other systems reviewed and are negative.         Past Medical History:   Diagnosis Date   • Allergy     year round   • Asthma    • Hyperthyroidism     hyperthyroid    • Migraine    • Patient currently pregnant 6/10/2020      Past Surgical History:   Procedure Laterality Date   • TONSILLECTOMY        Social History     Socioeconomic History   • Marital status: Single     Spouse name: Not on file   • Number of children: Not on file   • Years of education: Not on file   • Highest education level: Not on file   Occupational History   • Not on file   Tobacco Use   • Smoking status: Never Smoker   • Smokeless tobacco: Never Used   Vaping Use   • Vaping Use: Never used   Substance and Sexual Activity   • Alcohol use: Yes     Comment: rare   • Drug use: No   • Sexual activity: Yes     Partners: Male     Birth control/protection: I.U.D.   Other Topics Concern   • Not on file   Social History Narrative   • Not on file     Social Determinants of Health     Financial Resource Strain:  "Not on file   Food Insecurity: Not on file   Transportation Needs: Not on file   Physical Activity: Not on file   Stress: Not on file   Social Connections: Not on file   Intimate Partner Violence: Not on file   Housing Stability: Not on file         Objective     /84   Pulse (!) 136   Temp (!) 38.4 °C (101.2 °F) (Temporal)   Resp (!) 28   Ht 1.753 m (5' 9\")   Wt (!) 139 kg (306 lb)   SpO2 96%   Breastfeeding No   BMI 45.19 kg/m²      Physical Exam  Vitals and nursing note reviewed.   Constitutional:       Appearance: Normal appearance. She is normal weight.   HENT:      Head: Normocephalic and atraumatic.        Nose: Nasal tenderness present.      Left Sinus: Maxillary sinus tenderness present.      Mouth/Throat:      Mouth: Mucous membranes are moist.      Pharynx: Oropharynx is clear.   Eyes:      Extraocular Movements: Extraocular movements intact.      Pupils: Pupils are equal, round, and reactive to light.   Cardiovascular:      Rate and Rhythm: Normal rate and regular rhythm.   Pulmonary:      Effort: Pulmonary effort is normal.      Breath sounds: Normal breath sounds.   Musculoskeletal:         General: Normal range of motion.      Cervical back: Normal range of motion.   Skin:     General: Skin is warm and dry.      Capillary Refill: Capillary refill takes less than 2 seconds.   Neurological:      General: No focal deficit present.      Mental Status: She is alert and oriented to person, place, and time. Mental status is at baseline.   Psychiatric:         Mood and Affect: Mood normal.         Speech: Speech normal.         Thought Content: Thought content normal.         Judgment: Judgment normal.                             Assessment & Plan        1. Fever, unspecified fever cause  - acetaminophen (TYLENOL) tablet 1,000 mg    2. Sepsis, due to unspecified organism, unspecified whether acute organ dysfunction present (HCC)          Needs to be evaluated in ED tonight for concerns of " sepsis  Mother will drive her by POV  Called report to transfer center  Pt ambulated OOC with no acute changes to condition

## 2022-03-31 NOTE — ED TRIAGE NOTES
"Pt amb to triage with mother with   Chief Complaint   Patient presents with   • Fever   • Shortness of Breath   • Dental Pain   • Dizziness   • Tailbone Pain     S/P fall approx 1 weeks ago     Sepsis score of 4, Charge RN aware, protocol ordered.     /68   Pulse (!) 142   Temp (!) 38.2 °C (100.7 °F) (Temporal)   Resp (!) 26 Comment: pt encouraged to slow breathing  Ht 1.753 m (5' 9\")   Wt (!) 139 kg (305 lb 12.5 oz)   SpO2 98%   BMI 45.16 kg/m²     Pt/family updated on triage process, informed to let triage RN know of any changes in s/s, pt/family verbalized understanding.     "

## 2022-04-02 LAB
BACTERIA UR CULT: NORMAL
SIGNIFICANT IND 70042: NORMAL
SITE SITE: NORMAL
SOURCE SOURCE: NORMAL

## 2022-08-02 ENCOUNTER — OFFICE VISIT (OUTPATIENT)
Dept: MEDICAL GROUP | Facility: LAB | Age: 27
End: 2022-08-02
Payer: COMMERCIAL

## 2022-08-02 ENCOUNTER — HOSPITAL ENCOUNTER (OUTPATIENT)
Dept: LAB | Facility: MEDICAL CENTER | Age: 27
End: 2022-08-02
Attending: FAMILY MEDICINE
Payer: COMMERCIAL

## 2022-08-02 VITALS
SYSTOLIC BLOOD PRESSURE: 118 MMHG | HEART RATE: 96 BPM | HEIGHT: 69 IN | TEMPERATURE: 97.1 F | RESPIRATION RATE: 12 BRPM | DIASTOLIC BLOOD PRESSURE: 74 MMHG | OXYGEN SATURATION: 96 % | BODY MASS INDEX: 43.4 KG/M2 | WEIGHT: 293 LBS

## 2022-08-02 DIAGNOSIS — Z13.6 ENCOUNTER FOR SCREENING FOR CARDIOVASCULAR DISORDERS: ICD-10-CM

## 2022-08-02 DIAGNOSIS — R60.9 PERIPHERAL EDEMA: ICD-10-CM

## 2022-08-02 DIAGNOSIS — F41.9 ANXIETY AND DEPRESSION: ICD-10-CM

## 2022-08-02 DIAGNOSIS — E05.90 HYPERTHYROIDISM: ICD-10-CM

## 2022-08-02 DIAGNOSIS — J30.1 NON-SEASONAL ALLERGIC RHINITIS DUE TO POLLEN: ICD-10-CM

## 2022-08-02 DIAGNOSIS — F32.A ANXIETY AND DEPRESSION: ICD-10-CM

## 2022-08-02 LAB
ALBUMIN SERPL BCP-MCNC: 4.4 G/DL (ref 3.2–4.9)
ALBUMIN/GLOB SERPL: 1.6 G/DL
ALP SERPL-CCNC: 71 U/L (ref 30–99)
ALT SERPL-CCNC: 35 U/L (ref 2–50)
ANION GAP SERPL CALC-SCNC: 10 MMOL/L (ref 7–16)
APPEARANCE UR: ABNORMAL
AST SERPL-CCNC: 30 U/L (ref 12–45)
BACTERIA #/AREA URNS HPF: NEGATIVE /HPF
BILIRUB SERPL-MCNC: 0.5 MG/DL (ref 0.1–1.5)
BILIRUB UR QL STRIP.AUTO: NEGATIVE
BUN SERPL-MCNC: 6 MG/DL (ref 8–22)
CALCIUM SERPL-MCNC: 8.5 MG/DL (ref 8.5–10.5)
CHLORIDE SERPL-SCNC: 106 MMOL/L (ref 96–112)
CHOLEST SERPL-MCNC: 141 MG/DL (ref 100–199)
CO2 SERPL-SCNC: 23 MMOL/L (ref 20–33)
COLOR UR: YELLOW
CREAT SERPL-MCNC: 0.49 MG/DL (ref 0.5–1.4)
EPI CELLS #/AREA URNS HPF: ABNORMAL /HPF
FASTING STATUS PATIENT QL REPORTED: NORMAL
GFR SERPLBLD CREATININE-BSD FMLA CKD-EPI: 131 ML/MIN/1.73 M 2
GLOBULIN SER CALC-MCNC: 2.7 G/DL (ref 1.9–3.5)
GLUCOSE SERPL-MCNC: 90 MG/DL (ref 65–99)
GLUCOSE UR STRIP.AUTO-MCNC: NEGATIVE MG/DL
HDLC SERPL-MCNC: 41 MG/DL
KETONES UR STRIP.AUTO-MCNC: NEGATIVE MG/DL
LDLC SERPL CALC-MCNC: 84 MG/DL
LEUKOCYTE ESTERASE UR QL STRIP.AUTO: ABNORMAL
MICRO URNS: ABNORMAL
NITRITE UR QL STRIP.AUTO: NEGATIVE
PH UR STRIP.AUTO: 6.5 [PH] (ref 5–8)
POTASSIUM SERPL-SCNC: 3.9 MMOL/L (ref 3.6–5.5)
PROT SERPL-MCNC: 7.1 G/DL (ref 6–8.2)
PROT UR QL STRIP: NEGATIVE MG/DL
RBC # URNS HPF: ABNORMAL /HPF
RBC UR QL AUTO: NEGATIVE
RENAL EPI CELLS #/AREA URNS HPF: NEGATIVE /HPF
SODIUM SERPL-SCNC: 139 MMOL/L (ref 135–145)
SP GR UR STRIP.AUTO: 1.02
T4 FREE SERPL-MCNC: 2.22 NG/DL (ref 0.93–1.7)
TRIGL SERPL-MCNC: 79 MG/DL (ref 0–149)
TSH SERPL DL<=0.005 MIU/L-ACNC: <0.005 UIU/ML (ref 0.38–5.33)
UROBILINOGEN UR STRIP.AUTO-MCNC: 1 MG/DL
WBC #/AREA URNS HPF: ABNORMAL /HPF

## 2022-08-02 PROCEDURE — 99204 OFFICE O/P NEW MOD 45 MIN: CPT | Performed by: FAMILY MEDICINE

## 2022-08-02 PROCEDURE — 84439 ASSAY OF FREE THYROXINE: CPT

## 2022-08-02 PROCEDURE — 80061 LIPID PANEL: CPT

## 2022-08-02 PROCEDURE — 36415 COLL VENOUS BLD VENIPUNCTURE: CPT

## 2022-08-02 PROCEDURE — 81001 URINALYSIS AUTO W/SCOPE: CPT

## 2022-08-02 PROCEDURE — 80053 COMPREHEN METABOLIC PANEL: CPT

## 2022-08-02 PROCEDURE — 84443 ASSAY THYROID STIM HORMONE: CPT

## 2022-08-02 RX ORDER — FLUOXETINE HYDROCHLORIDE 20 MG/1
20 CAPSULE ORAL DAILY
Qty: 90 CAPSULE | Refills: 3 | Status: SHIPPED | OUTPATIENT
Start: 2022-08-02 | End: 2023-10-02

## 2022-08-02 ASSESSMENT — ANXIETY QUESTIONNAIRES
GAD7 TOTAL SCORE: 21
1. FEELING NERVOUS, ANXIOUS, OR ON EDGE: NEARLY EVERY DAY
5. BEING SO RESTLESS THAT IT IS HARD TO SIT STILL: NEARLY EVERY DAY
6. BECOMING EASILY ANNOYED OR IRRITABLE: NEARLY EVERY DAY
4. TROUBLE RELAXING: NEARLY EVERY DAY
IF YOU CHECKED OFF ANY PROBLEMS ON THIS QUESTIONNAIRE, HOW DIFFICULT HAVE THESE PROBLEMS MADE IT FOR YOU TO DO YOUR WORK, TAKE CARE OF THINGS AT HOME, OR GET ALONG WITH OTHER PEOPLE: VERY DIFFICULT
2. NOT BEING ABLE TO STOP OR CONTROL WORRYING: NEARLY EVERY DAY
3. WORRYING TOO MUCH ABOUT DIFFERENT THINGS: NEARLY EVERY DAY
7. FEELING AFRAID AS IF SOMETHING AWFUL MIGHT HAPPEN: NEARLY EVERY DAY

## 2022-08-02 ASSESSMENT — PATIENT HEALTH QUESTIONNAIRE - PHQ9
5. POOR APPETITE OR OVEREATING: 3 - NEARLY EVERY DAY
SUM OF ALL RESPONSES TO PHQ QUESTIONS 1-9: 22
CLINICAL INTERPRETATION OF PHQ2 SCORE: 4

## 2022-08-02 ASSESSMENT — FIBROSIS 4 INDEX: FIB4 SCORE: 0.51

## 2022-08-02 NOTE — PROGRESS NOTES
Chief Complaint   Patient presents with   • New Patient         Damari Hansen is a 26 y.o. female here to establish care and for evaluation and management of:        HPI:    Anxiety worsening lately.   Was on prozac in the past. Stopped going to doctor and so self sabotaged.   Prozac was helpful somewhat. Has 2 kids, works full time.     Hyperactive thyroid since age 14. Was seeing Dr Garrett for a time. Didn't go well. Meds cause weight gain.     Diet: not good. Single mom. Overwhelmed a lot. Easy, convenience foods. Rarely eating dinner. One meal a day.   Exercise: trying to be active during the day    Last methimazole for 2 months, but off now for close to a year.     Mirena in place. No period on this. Due for replacement October 2022.     Depression Screening    Little interest or pleasure in doing things?  2 - more than half the days   Feeling down, depressed , or hopeless? 2 - more than half the days   Trouble falling or staying asleep, or sleeping too much?  3 - nearly every day   Feeling tired or having little energy?  3 - nearly every day   Poor appetite or overeating?  3 - nearly every day   Feeling bad about yourself - or that you are a failure or have let yourself or your family down? 3 - nearly every day   Trouble concentrating on things, such as reading the newspaper or watching television? 3 - nearly every day   Moving or speaking so slowly that other people could have noticed.  Or the opposite - being so fidgety or restless that you have been moving around a lot more than usual?  2 - more than half the days   Thoughts that you would be better off dead, or of hurting yourself?  1 - several days   Patient Health Questionnaire Score: 22       If depressive symptoms identified deferred to follow up visit unless specifically addressed in assesment and plan.    Interpretation of PHQ-9 Total Score   Score Severity   1-4 No Depression   5-9 Mild Depression   10-14 Moderate Depression   15-19 Moderately  Severe Depression   20-27 Severe Depression    MEGA-7 Questionnaire    Feeling nervous, anxious, or on edge: Nearly every day  Not being able to sop or control worrying: Nearly every day  Worrying too much about different things: Nearly every day  Trouble relaxing: Nearly every day  Being so restless that it's hard to sit still: Nearly every day  Becoming easily annoyed or irritable: Nearly every day  Feeling afraid as if something awful might happen: Nearly every day  Total: 21    Interpretation of MEGA 7 Total Score   Score Severity :  0-4 No Anxiety   5-9 Mild Anxiety  10-14 Moderate Anxiety  15-21 Severe Anxiety        Allergies   Allergen Reactions   • Amoxicillin Hives     Rxn - 2016       Current medicines (including changes today)  Current Outpatient Medications   Medication Sig Dispense Refill   • FLUoxetine (PROZAC) 20 MG Cap Take 1 Capsule by mouth every day. 90 Capsule 3   • fluticasone (FLONASE) 50 MCG/ACT nasal spray Administer 1 Spray into affected nostril(S) every day. 16 g 3   • loratadine (CLARITIN) 10 MG Tab Take 10 mg by mouth every day.       No current facility-administered medications for this visit.     She  has a past medical history of Allergy, Asthma, Hyperthyroidism, Migraine, and Patient currently pregnant (6/10/2020).  She  has a past surgical history that includes tonsillectomy.  Social History     Tobacco Use   • Smoking status: Never Smoker   • Smokeless tobacco: Never Used   Vaping Use   • Vaping Use: Never used   Substance Use Topics   • Alcohol use: Yes     Comment: rare   • Drug use: No     Social History     Social History Narrative   • Not on file     Family History   Problem Relation Age of Onset   • Cancer Father         melanoma   • Cancer Paternal Grandfather         lymphoma   • Dementia Mother    • Cancer Maternal Aunt         breast cancer   • Dementia Maternal Grandmother    • Dementia Maternal Grandfather    • No Known Problems Paternal Grandmother    • Cancer Maternal  "Aunt         lymphmoa     Family Status   Relation Name Status   • Fa     • PGFa     • Mo  Alive   • MAunt  Alive   • MGMo     • MGFa     • PGMo     • MAunt  (Not Specified)         ROS  No fever or chills.  No nausea or vomiting.  No chest pain or palpitations.  No cough or SOB.  No pain with urination or hematuria.  No black or bloody stools.  All other systems reviewed and are negative     Objective:     /74   Pulse 96   Temp 36.2 °C (97.1 °F)   Resp 12   Ht 1.753 m (5' 9\")   Wt (!) 135 kg (297 lb)   SpO2 96%  Body mass index is 43.86 kg/m².  Physical Exam:      Well developed, well nourished.  Alert, oriented in no acute distress.  Psych: Eye contact is good, speech goal directed, affect calm  Eyes: conjunctiva non-injected, sclera non-icteric.  Ears: Pinna normal. TM pearly gray.   Nose: Nares are patent.  Normal mucosa  Mouth: Oral mucous membranes pink and moist with no lesions.  Neck Supple.  No adenopathy or masses in the neck or supraclavicular regions. No thyromegaly  Lungs: clear to auscultation bilaterally with good excursion. No wheezes or rhonchi  CV: regular rate and rhythm. No murmur  Abdomen: soft, nontender, no masses or organomegaly.  No rebound or gaurding  Ext: no edema, color normal, vascularity normal, temperature normal      Assessment and Plan:   The following treatment plan was discussed  1. Hyperthyroidism  Discussed hyperthyroidism.  Discussed that she has been off medications for over a year now and this is likely precipitating some anxiety, even without causing any weight loss.  I think she very well may be very complicated from an endocrinology standpoint with obesity as well as hyperthyroidism and so may need a referral to endocrine.  We will get labs done today.  - TSH WITH REFLEX TO FT4; Future    2. Anxiety and depression  Discussed the importance of getting regulated on medication but also counseling and therapy as well.  She " reports that she is does not have time for this right now and so we will see how well she does with some medication to help calm things down.  We did discuss benefit of therapy and also self-care with exercise and different dietary habits as well.  - FLUoxetine (PROZAC) 20 MG Cap; Take 1 Capsule by mouth every day.  Dispense: 90 Capsule; Refill: 3    3. Encounter for screening for cardiovascular disorders  Discussed routine blood work and cardiovascular risk screening.  - Comp Metabolic Panel; Future  - Lipid Profile; Future    4. Peripheral edema  Reports some issues with swelling in her feet.  We will get a urinalysis to evaluate for proteinuria.  I do think this is likely related to her endocrine system as well as weight.  - URINALYSIS,CULTURE IF INDICATED; Future    5. Non-seasonal allergic rhinitis due to pollen  Reports that she has seen Dr. Garcia in the past and was supposed to get set up for allergy shots but her insurance did not cover at the time.  She thinks that she is able to get these down.  - Referral to Allergy      Records requested.        Followup: Return in about 3 weeks (around 8/23/2022) for follow up meds.

## 2022-08-04 DIAGNOSIS — Z71.9 ENCOUNTER FOR CONSULTATION: ICD-10-CM

## 2022-08-05 ENCOUNTER — E-CONSULT (OUTPATIENT)
Dept: ENDOCRINOLOGY | Facility: MEDICAL CENTER | Age: 27
End: 2022-08-05
Payer: COMMERCIAL

## 2022-08-05 DIAGNOSIS — Z71.9 ENCOUNTER FOR CONSULTATION: ICD-10-CM

## 2022-08-06 NOTE — PROGRESS NOTES
E-Consult Response     After careful review of the patient's information available in the medical record, the following are my findings and recommendations:    Reason for consult: Recommendations for patient with overt hyperthyroidism from parable Graves' disease who does not want to take methimazole    Summary of data reviewed: In summary this is a 26-year-old female born on August 2, 2024     She has Graves' disease apparently this is not new and was already diagnosed in 2021 her thyroid uptake and scan showed 5-hour uptake of 70% with diffuse uptake throughout the gland consistent with Graves' hyperthyroidism      She is not on medical therapy    She is not on methimazole based on the medical records    Her most recent labs show that she has uncontrolled hyperthyroidism as of August 2, 2022 with an undetectable TSH and frankly elevated free T4      According to the office notes on August 2, 2022 the patient used to see Dr. Garrett and did not like taking methimazole because of the weight gain    She has been off methimazole for close to a year        Recommendations: This patient has few options for treatment of her Graves' hyperthyroidism the 3 main choices for hyperthyroidism management are medical therapy with antithyroid drugs, radioactive iodine therapy and surgery as a last resort      Any effort to control the overactive thyroid from the above choices  will unfortunately result in some form of weight gain because the metabolism corrects itself and resets    However the weight gain itself is not absolute and can be managed over time    I recommend that she review the treatment options with a board certified specialist    Since the claimant is obese with a BMI > 40  I recommend a dual approach of treating the grave's disease with medications and treating obesity with weight loss medications to address the issues of concern         E-Consult Time: 15 minutes were spent with >50% of the total time spent  discussing plan of care with requesting physician (Use code [id number])    Jimbo Beasley M.D.

## 2022-08-23 ENCOUNTER — TELEPHONE (OUTPATIENT)
Dept: MEDICAL GROUP | Facility: LAB | Age: 27
End: 2022-08-23

## 2022-08-23 ENCOUNTER — OFFICE VISIT (OUTPATIENT)
Dept: MEDICAL GROUP | Facility: LAB | Age: 27
End: 2022-08-23
Payer: COMMERCIAL

## 2022-08-23 VITALS
RESPIRATION RATE: 12 BRPM | OXYGEN SATURATION: 95 % | TEMPERATURE: 96.8 F | HEIGHT: 69 IN | SYSTOLIC BLOOD PRESSURE: 110 MMHG | DIASTOLIC BLOOD PRESSURE: 76 MMHG | HEART RATE: 82 BPM | WEIGHT: 290 LBS | BODY MASS INDEX: 42.95 KG/M2

## 2022-08-23 DIAGNOSIS — F51.04 PSYCHOPHYSIOLOGICAL INSOMNIA: ICD-10-CM

## 2022-08-23 DIAGNOSIS — E05.90 HYPERTHYROIDISM: ICD-10-CM

## 2022-08-23 PROCEDURE — 99214 OFFICE O/P EST MOD 30 MIN: CPT | Performed by: FAMILY MEDICINE

## 2022-08-23 RX ORDER — METHIMAZOLE 10 MG/1
10 TABLET ORAL DAILY
Qty: 30 TABLET | Refills: 1 | Status: SHIPPED | OUTPATIENT
Start: 2022-08-23 | End: 2022-09-13 | Stop reason: SDUPTHER

## 2022-08-23 RX ORDER — TRAZODONE HYDROCHLORIDE 50 MG/1
50 TABLET ORAL NIGHTLY
Qty: 30 TABLET | Refills: 3 | Status: SHIPPED | OUTPATIENT
Start: 2022-08-23 | End: 2023-10-02

## 2022-08-23 RX ORDER — PHENTERMINE HYDROCHLORIDE 37.5 MG/1
37.5 TABLET ORAL
Qty: 30 TABLET | Refills: 0 | Status: SHIPPED | OUTPATIENT
Start: 2022-08-23 | End: 2022-09-13 | Stop reason: SDUPTHER

## 2022-08-23 ASSESSMENT — FIBROSIS 4 INDEX: FIB4 SCORE: 0.64

## 2022-08-23 NOTE — PROGRESS NOTES
"Subjective:     Chief Complaint   Patient presents with    Follow-Up         HPI:   Damari presents today to follow up anxiety/depression and hyperthyroidism.   Still finding triggers with her kids and driving. Panic attacks, needs to have her mom drive.   No other meds in the past for anxiety.   Still struggling with sleep as well. Taking fluoxetine in the mornings.             Current Outpatient Medications Ordered in Epic   Medication Sig Dispense Refill    traZODone (DESYREL) 50 MG Tab Take 1 Tablet by mouth every evening. 30 Tablet 3    methimazole (TAPAZOLE) 10 MG Tab Take 1 Tablet by mouth every day. 30 Tablet 1    phentermine (ADIPEX-P) 37.5 MG tablet Take 1 Tablet by mouth every morning before breakfast for 30 days. 30 Tablet 0    FLUoxetine (PROZAC) 20 MG Cap Take 1 Capsule by mouth every day. 90 Capsule 3    fluticasone (FLONASE) 50 MCG/ACT nasal spray Administer 1 Spray into affected nostril(S) every day. 16 g 3    loratadine (CLARITIN) 10 MG Tab Take 10 mg by mouth every day.       No current [facility]-ordered facility-administered medications on file.         ROS:  Gen: no fevers/chills, no changes in weight  Eyes: no changes in vision  ENT: no sore throat, no hearing loss, no bloody nose  Pulm: no sob, no cough  CV: no chest pain, no palpitations  GI: no nausea/vomiting, no diarrhea  : no dysuria  MSk: no myalgias  Skin: no rash  Neuro: no headaches, no numbness/tingling  Heme/Lymph: no easy bruising      Objective:     Exam:  /76 (BP Location: Right arm, Patient Position: Sitting, BP Cuff Size: Large adult)   Pulse 82   Temp 36 °C (96.8 °F)   Resp 12   Ht 1.753 m (5' 9\")   Wt (!) 132 kg (290 lb)   SpO2 95%   BMI 42.83 kg/m²  Body mass index is 42.83 kg/m².    Gen: Alert and oriented, No apparent distress.  Neck: Neck is supple without lymphadenopathy.  Lungs: Normal effort, CTA bilaterally, no wheezes, rhonchi, or rales  CV: Regular rate and rhythm. No murmurs, rubs, or gallops.  Ext: No " clubbing, cyanosis, edema.      Assessment & Plan:     26 y.o. female with the following -   1. Psychophysiological insomnia  Discussed trying to better treat her sleep to see if she has better daytime anxiety symptoms.  We did discuss use of trazodone and other medications.  We will start this at a half tab and she can move to a full tab if she would like.  Discussed possibly needing to switch this to something else or using different daytime medications in the future as well.  - traZODone (DESYREL) 50 MG Tab; Take 1 Tablet by mouth every evening.  Dispense: 30 Tablet; Refill: 3    2. Hyperthyroidism  We did discuss referral to endocrinology in office after receiving E consult from Dr. Dunn.  We will get her started back on methimazole once daily and increase as needed based on her thyroid numbers.  - Referral to Endocrinology  - methimazole (TAPAZOLE) 10 MG Tab; Take 1 Tablet by mouth every day.  Dispense: 30 Tablet; Refill: 1  - TSH WITH REFLEX TO FT4; Future    3. BMI 40.0-44.9, adult (HCC)  We did discuss her concerns with regard to weight gain with reducing her thyroid hormone.  Discussed different options with medication.  We will go ahead and start with some phentermine to see how she does with this.  We did discuss in the future other medications which are often not covered by insurance but could be very helpful.  Discussed continuing to work on diet and exercise recommendations as well.  We will see her back in 4 weeks for recheck.  - phentermine (ADIPEX-P) 37.5 MG tablet; Take 1 Tablet by mouth every morning before breakfast for 30 days.  Dispense: 30 Tablet; Refill: 0          Return in about 4 weeks (around 9/20/2022) for follow up meds.    Please note that this dictation was created using voice recognition software. I have made every reasonable attempt to correct obvious errors, but I expect that there are errors of grammar and possibly content that I did not discover before finalizing the  note.

## 2022-08-23 NOTE — TELEPHONE ENCOUNTER
Drug  Phentermine HCl 37.5MG tablets    Form  [facility] Electronic Prior Authorization Request Form (2017 [employer])    Key: [id number]

## 2022-09-12 ENCOUNTER — HOSPITAL ENCOUNTER (OUTPATIENT)
Dept: LAB | Facility: MEDICAL CENTER | Age: 27
End: 2022-09-12
Attending: FAMILY MEDICINE
Payer: COMMERCIAL

## 2022-09-12 DIAGNOSIS — E05.90 HYPERTHYROIDISM: ICD-10-CM

## 2022-09-12 LAB
T4 FREE SERPL-MCNC: 1.86 NG/DL (ref 0.93–1.7)
TSH SERPL DL<=0.005 MIU/L-ACNC: <0.005 UIU/ML (ref 0.38–5.33)

## 2022-09-12 PROCEDURE — 84439 ASSAY OF FREE THYROXINE: CPT

## 2022-09-12 PROCEDURE — 36415 COLL VENOUS BLD VENIPUNCTURE: CPT

## 2022-09-12 PROCEDURE — 84443 ASSAY THYROID STIM HORMONE: CPT

## 2022-09-13 ENCOUNTER — OFFICE VISIT (OUTPATIENT)
Dept: MEDICAL GROUP | Facility: LAB | Age: 27
End: 2022-09-13
Payer: COMMERCIAL

## 2022-09-13 VITALS
DIASTOLIC BLOOD PRESSURE: 78 MMHG | WEIGHT: 282 LBS | TEMPERATURE: 97 F | SYSTOLIC BLOOD PRESSURE: 100 MMHG | HEART RATE: 83 BPM | RESPIRATION RATE: 12 BRPM | BODY MASS INDEX: 41.77 KG/M2 | HEIGHT: 69 IN | OXYGEN SATURATION: 97 %

## 2022-09-13 DIAGNOSIS — E05.90 HYPERTHYROIDISM: ICD-10-CM

## 2022-09-13 PROCEDURE — 99214 OFFICE O/P EST MOD 30 MIN: CPT | Performed by: FAMILY MEDICINE

## 2022-09-13 RX ORDER — METHIMAZOLE 10 MG/1
10 TABLET ORAL DAILY
Qty: 90 TABLET | Refills: 1 | Status: SHIPPED | OUTPATIENT
Start: 2022-09-13 | End: 2022-09-15

## 2022-09-13 RX ORDER — PHENTERMINE HYDROCHLORIDE 37.5 MG/1
37.5 TABLET ORAL
Qty: 30 TABLET | Refills: 0 | Status: SHIPPED | OUTPATIENT
Start: 2022-09-13 | End: 2022-09-25 | Stop reason: SDUPTHER

## 2022-09-13 ASSESSMENT — FIBROSIS 4 INDEX: FIB4 SCORE: 0.64

## 2022-09-13 NOTE — PROGRESS NOTES
"Subjective:     Chief Complaint   Patient presents with    Follow-Up         HPI:   Damari presents today with follow up. Sleep better with half tab of trazodone.   Has also lost 15 pounds since the beginning of august.   Really eating only a couple meals a day but getting protein shake as well. Overall things are going well. Metallic taste in mouth, but not too bothersome.        Latest Reference Range & Units 8/2/22 08:58 9/12/22 09:17   TSH 0.380 - 5.330 uIU/mL <0.005 (L) <0.005 (L)   Free T-4 0.93 - 1.70 ng/dL 2.22 (H) 1.86 (H)   (L): Data is abnormally low  (H): Data is abnormally high    Current Outpatient Medications Ordered in Epic   Medication Sig Dispense Refill    traZODone (DESYREL) 50 MG Tab Take 1 Tablet by mouth every evening. 30 Tablet 3    methimazole (TAPAZOLE) 10 MG Tab Take 1 Tablet by mouth every day. 30 Tablet 1    phentermine (ADIPEX-P) 37.5 MG tablet Take 1 Tablet by mouth every morning before breakfast for 30 days. 30 Tablet 0    FLUoxetine (PROZAC) 20 MG Cap Take 1 Capsule by mouth every day. 90 Capsule 3    fluticasone (FLONASE) 50 MCG/ACT nasal spray Administer 1 Spray into affected nostril(S) every day. 16 g 3    loratadine (CLARITIN) 10 MG Tab Take 10 mg by mouth every day.       No current [facility]-ordered facility-administered medications on file.         ROS:  Gen: no fevers/chills, no changes in weight  Eyes: no changes in vision  ENT: no sore throat, no hearing loss, no bloody nose  Pulm: no sob, no cough  CV: no chest pain, no palpitations  GI: no nausea/vomiting, no diarrhea  : no dysuria  MSk: no myalgias  Skin: no rash  Neuro: no headaches, no numbness/tingling  Heme/Lymph: no easy bruising      Objective:     Exam:  /78 (BP Location: Left arm, Patient Position: Sitting, BP Cuff Size: Adult long)   Pulse 83   Temp 36.1 °C (97 °F)   Resp 12   Ht 1.753 m (5' 9\")   Wt (!) 128 kg (282 lb)   SpO2 97%   BMI 41.64 kg/m²  Body mass index is 41.64 kg/m².    Gen: AAOx3, NAD, " well appearing  HEENT: NCAT, EOMI, Nares patent, Mucosa moist  Resp: Normal chest wall rise and fall, not SOB, no tachypnea  Skin: no rash or abnormality of visible skin.   Psych: normal speech, not slurred, good insight, affect full  MSK: Moves all four limbs equally and normally, gait normal        Assessment & Plan:     26 y.o. female with the following -   1. Hyperthyroidism  Discussed leaving with methimazole as it is.  Her T4 has come down a bit from last check.  We will go ahead and recheck this in 2 months.  She has been referred to endocrinology and she is given the phone number today to schedule.  - TSH; Future  - FREE THYROXINE; Future  - methimazole (TAPAZOLE) 10 MG Tab; Take 1 Tablet by mouth every day.  Dispense: 90 Tablet; Refill: 1    2. BMI 40.0-44.9, adult (HCC)  She is congratulated on recent weight loss.  We did discuss different dietary recommendations.  She should try to continue with lean proteins and veggies and fruit.  Okay to use her protein shake as well.  Is okay if she does not have 3 large meals a day she gets to that her nutritionally sound that should be fine.  Refills of her phentermine are sent in.  Discussed continued side effects to watch for.  - phentermine (ADIPEX-P) 37.5 MG tablet; Take 1 Tablet by mouth every morning before breakfast for 30 days.  Dispense: 30 Tablet; Refill: 0            No follow-ups on file.    Please note that this dictation was created using voice recognition software. I have made every reasonable attempt to correct obvious errors, but I expect that there are errors of grammar and possibly content that I did not discover before finalizing the note.

## 2022-09-15 ENCOUNTER — OFFICE VISIT (OUTPATIENT)
Dept: ENDOCRINOLOGY | Facility: MEDICAL CENTER | Age: 27
End: 2022-09-15
Payer: COMMERCIAL

## 2022-09-15 VITALS
BODY MASS INDEX: 41.99 KG/M2 | OXYGEN SATURATION: 94 % | SYSTOLIC BLOOD PRESSURE: 98 MMHG | WEIGHT: 283.5 LBS | HEART RATE: 121 BPM | DIASTOLIC BLOOD PRESSURE: 64 MMHG | HEIGHT: 69 IN

## 2022-09-15 DIAGNOSIS — E05.90 HYPERTHYROIDISM: ICD-10-CM

## 2022-09-15 DIAGNOSIS — Z79.899 HIGH RISK MEDICATION USE: ICD-10-CM

## 2022-09-15 DIAGNOSIS — E05.00 GRAVES' EYE DISEASE: ICD-10-CM

## 2022-09-15 DIAGNOSIS — E55.9 VITAMIN D DEFICIENCY: ICD-10-CM

## 2022-09-15 PROCEDURE — 99211 OFF/OP EST MAY X REQ PHY/QHP: CPT

## 2022-09-15 PROCEDURE — 99204 OFFICE O/P NEW MOD 45 MIN: CPT

## 2022-09-15 RX ORDER — METHIMAZOLE 10 MG/1
10 TABLET ORAL
Qty: 180 TABLET | Refills: 6 | Status: SHIPPED | OUTPATIENT
Start: 2022-09-15 | End: 2023-10-02

## 2022-09-15 ASSESSMENT — FIBROSIS 4 INDEX: FIB4 SCORE: 0.64

## 2022-09-15 NOTE — PROGRESS NOTES
Chief Complaint: Consult requested by Krystle Murphy M.D. for evaluation of the following conditions    HPI:   Damari Hansen is a 26 y.o. female     1.  Hyperthyroidism:  Diagnosed when she was 14 years old due to stress from life event  She stopped methimazole on her one and years after s/s develop during both pregnancies    Currently taking methimazole 10 mg daily a day-August of 2022  SE of depression with methimazole, currently taking Prozac 20mg daily     She reports the following symptoms:anxiousness, feeling excessive energy, and palpitations which has been present since she was 18 years old     She denies fatigue, weight gain, feeling cold and cold intolerance, constipation, and swelling.    She reports lumps or enlargement in the neck.    She reports a family history of  with Thyroid disease  She denies taking biotin.    She denies any recent IV contrast exposure.     She denies any recent URI or having neck pain.     Latest Reference Range & Units 8/2/22 08:58 9/12/22 09:17   TSH 0.380 - 5.330 uIU/mL <0.005 (L) <0.005 (L)   Free T-4 0.93 - 1.70 ng/dL 2.22 (H) 1.86 (H)     NM scan done on 5/25/2021 showed a 5 hours uptake of 70% consistent with hyperthyroidism and Graves' disease,     2.  Vitamin D deficiency:  Levels will be evaluated    3. Grave's Eye Disease:   Reports eye pain, redeness  Denies gritty eye pain         Patient's medications, allergies, and social histories were reviewed and updated as appropriate.      ROS:     CONS:     No fever, no chills, no weight loss, no fatigue   EYES:      No diplopia, no blurry vision, no redness of eyes, no swelling of eyelids   ENT:    No hearing loss, No ear pain, No sore throat, no dysphagia, no neck swelling   CV:     No chest pain, no palpitations, no claudication, no orthopnea, no PND   PULM:    No SOB, no cough, no hemoptysis, no wheezing    GI:   No nausea, no vomiting, no diarrhea, no constipation, no bloody stools   :  Passing  urine well, no dysuria, no hematuria   ENDO:   No polyuria, no polydipsia, no heat intolerance, no cold intolerance   NEURO: No headaches, no dizziness, no convulsions, no tremors   MUSC:  No joint swellings, no arthralgias, no myalgias, no weakness   SKIN:   No rash, no ulcers, no dry skin   PSYCH:   No depression, no anxiety, no difficulty sleeping       Past Medical History:  Patient Active Problem List    Diagnosis Date Noted    Nasal congestion 09/14/2021    Anxiety and depression 04/15/2021    IUD (intrauterine device) in place 08/11/2020    Non-toxic goiter 06/10/2020    Hyperthyroidism 02/13/2018    Obesity 02/13/2018    Pain in joint, lower leg 07/17/2008    Other meniscus derangements, unspecified lateral meniscus, unspecified knee 07/10/2008    Mild persistent asthma 09/17/2007    Asthma 09/14/2007    Cough 11/15/2006    Allergic rhinitis 11/15/2006       Past Surgical History:  Past Surgical History:   Procedure Laterality Date    TONSILLECTOMY          Allergies:  Amoxicillin     Current Medications:    Current Outpatient Medications:     methimazole (TAPAZOLE) 10 MG Tab, Take 1 Tablet by mouth every day., Disp: 90 Tablet, Rfl: 1    phentermine (ADIPEX-P) 37.5 MG tablet, Take 1 Tablet by mouth every morning before breakfast for 30 days., Disp: 30 Tablet, Rfl: 0    traZODone (DESYREL) 50 MG Tab, Take 1 Tablet by mouth every evening., Disp: 30 Tablet, Rfl: 3    FLUoxetine (PROZAC) 20 MG Cap, Take 1 Capsule by mouth every day., Disp: 90 Capsule, Rfl: 3    fluticasone (FLONASE) 50 MCG/ACT nasal spray, Administer 1 Spray into affected nostril(S) every day., Disp: 16 g, Rfl: 3    loratadine (CLARITIN) 10 MG Tab, Take 10 mg by mouth every day., Disp: , Rfl:     Social History:  Social History     Socioeconomic History    Marital status: Single     Spouse name: Not on file    Number of children: Not on file    Years of education: Not on file    Highest education level: Not on file   Occupational History    Not  "on file   Tobacco Use    Smoking status: Never    Smokeless tobacco: Never   Vaping Use    Vaping Use: Never used   Substance and Sexual Activity    Alcohol use: Yes     Comment: rare    Drug use: No    Sexual activity: Yes     Partners: Male     Birth control/protection: I.U.D.   Other Topics Concern    Not on file   Social History Narrative    Not on file     Social Determinants of Health     Financial Resource Strain: Not on file   Food Insecurity: Not on file   Transportation Needs: Not on file   Physical Activity: Not on file   Stress: Not on file   Social Connections: Not on file   Intimate Partner Violence: Not on file   Housing Stability: Not on file        Family History:   Family History   Problem Relation Age of Onset    Cancer Father         melanoma    Cancer Paternal Grandfather         lymphoma    Dementia Mother     Cancer Maternal Aunt         breast cancer    Dementia Maternal Grandmother     Dementia Maternal Grandfather     No Known Problems Paternal Grandmother     Cancer Maternal Aunt         lymphmoa         PHYSICAL EXAM:   Vital signs: BP (!) 98/64 (BP Location: Left arm, Patient Position: Sitting, BP Cuff Size: Large adult)   Pulse (!) 121   Ht 1.753 m (5' 9\")   Wt (!) 129 kg (283 lb 8 oz)   SpO2 94%   BMI 41.87 kg/m²   GENERAL: Well-developed, well-nourished  in no apparent distress.   EYE: No ocular and eyelid asymmetry, Anicteric sclerae,  PERRL, No exophthalmos or lidlag  HENT: Hearing grossly intact, Normocephalic, atraumatic.   NECK: Supple. Trachea midline. thyroid enlarged, smooth, nontender, no nodules  CARDIOVASCULAR: Regular rate and rhythm. No murmurs, rubs, or gallops.   LUNGS: Clear to auscultation bilaterally   EXTREMITIES: No clubbing, cyanosis, or edema.   NEUROLOGICAL: Cranial nerves II-XII are grossly intact   Symmetric reflexes at the patella no proximal muscle weakness, No visible tremor with both outstretched hands  LYMPH: No cervical, supraclavicular,  adenopathy " palpated.   SKIN: No rashes, lesions. Turgor is normal.    Labs:  Lab Results   Component Value Date/Time    WBC 9.9 03/30/2022 06:51 PM    RBC 5.31 03/30/2022 06:51 PM    HEMOGLOBIN 14.7 03/30/2022 06:51 PM    MCV 82.5 03/30/2022 06:51 PM    MCH 27.7 03/30/2022 06:51 PM    MCHC 33.6 03/30/2022 06:51 PM    RDW 38.4 03/30/2022 06:51 PM    MPV 10.4 03/30/2022 06:51 PM       Lab Results   Component Value Date/Time    SODIUM 139 08/02/2022 08:58 AM    POTASSIUM 3.9 08/02/2022 08:58 AM    CHLORIDE 106 08/02/2022 08:58 AM    CO2 23 08/02/2022 08:58 AM    ANION 10.0 08/02/2022 08:58 AM    GLUCOSE 90 08/02/2022 08:58 AM    BUN 6 (L) 08/02/2022 08:58 AM    CREATININE 0.49 (L) 08/02/2022 08:58 AM    CALCIUM 8.5 08/02/2022 08:58 AM    ASTSGOT 30 08/02/2022 08:58 AM    ALTSGPT 35 08/02/2022 08:58 AM    TBILIRUBIN 0.5 08/02/2022 08:58 AM    ALBUMIN 4.4 08/02/2022 08:58 AM    TOTPROTEIN 7.1 08/02/2022 08:58 AM    GLOBULIN 2.7 08/02/2022 08:58 AM    AGRATIO 1.6 08/02/2022 08:58 AM       Lab Results   Component Value Date/Time    TSHULTRASEN <0.005 (L) 09/12/2022 0917     Lab Results   Component Value Date/Time    FREET4 1.86 (H) 09/12/2022 0917       Imaging:      ASSESSMENT/PLAN:   1. Hyperthyroidism  Clinically unstable  Biochemically unstable    She has been taking methimazole 10 mg daily for a month now-her levels are mildly improved  She reports her methimazole has the side effects of depression-her primary care provider prescribed Prozac with relief  We will increase methimazole from 10 mg daily to 10 mg twice a day-she can take both pills in the morning for easy to remember    - methimazole (TAPAZOLE) 10 MG Tab; Take 1 Tablet by mouth 2 times a day.  Dispense: 180 Tablet; Refill: 6  - Referral to Ophthalmology  - TSH; Future  - FREE THYROXINE; Future  - T3 FREE; Future  - Comp Metabolic Panel; Future    2. Vitamin D deficiency  We will evaluate levels  - VITAMIN D,25 HYDROXY (DEFICIENCY); Future    3. Graves' eye  disease  Unstable but undetermined whether she is a candidate for treatment unknown  I have referred her to ophthalmology for further evaluation     4.  High risk medication use:  On methimazole for hyperthyroidism treatment  Please stop methimazole if you develop any signs and symptoms of infection such as fever, malaise, COVID and resume when you are better    Disposition: Make an appointment to follow-up in 3 months  Do your blood work 1 week prior to next appointment      Thank you kindly for allowing me to participate in the thyroid care plan for this patient.    Flo Parker A.P.R.N.   09/15/22    CC:   Krystle Murphy M.D.

## 2022-09-26 RX ORDER — PHENTERMINE HYDROCHLORIDE 37.5 MG/1
37.5 TABLET ORAL
Qty: 30 TABLET | Refills: 0 | Status: SHIPPED | OUTPATIENT
Start: 2022-09-26 | End: 2022-10-26

## 2022-09-26 NOTE — TELEPHONE ENCOUNTER
Received request via: Pharmacy  9/13/2022lov  Was the patient seen in the last year in this department? Yes    Does the patient have an active prescription (recently filled or refills available) for medication(s) requested? No

## 2022-10-24 NOTE — ED PROVIDER NOTES
"ED Provider Note    CHIEF COMPLAINT  Chief Complaint   Patient presents with   • Pregnancy   • Vaginal Bleeding       HPI  Damari Hansen is a 21 y.o.  female with LMP of  who presents complaining of vaginal bleeding. Patient states she has had 3 positive pregnancy tests at home.    Patient reports vaginal bleeding, using 4 pads per day, for the last week. Chest reports mild to moderate crampy lower abdominal pain. She denies nausea, vomiting, fever, chills, urinary symptoms.    Patient reports she has \"an overactive thyroid.\" She is not currently on any thyroid medications. She has not had her thyroid checked in several years.      ALLERGIES  Allergies   Allergen Reactions   • Amoxicillin Hives       CURRENT MEDICATIONS  Denies    PAST MEDICAL HISTORY     Overactive thyroid    SURGICAL HISTORY  patient denies any surgical history    SOCIAL HISTORY  Social History     Social History Main Topics   • Smoking status: Never Smoker    • Smokeless tobacco: Never Used   • Alcohol Use: No   • Drug Use: No   • Sexual Activity:     Partners: Male       Family Hx:  Denies      REVIEW OF SYSTEMS  See HPI for further details.  All other systems are negative except as above in HPI.    PHYSICAL EXAM  VITAL SIGNS: /79 mmHg  Pulse 111  Temp(Src) 37 °C (98.6 °F)  Resp 16  Ht 1.753 m (5' 9.02\")  Wt 92.9 kg (204 lb 12.9 oz)  BMI 30.23 kg/m2  SpO2 99%  LMP 2017 (Approximate)    General:  WDWN, nontoxic appearing, tearful; anxious A+Ox3; V/S as above; tachycardic, afebrile  Skin: warm and dry; good color; no rash  HEENT: NCAT; EOMs intact; PERRL; no scleral icterus   Neck: FROM; no meningismus, no LAD; fullness of the thyroid, no nodules  Cardiovascular: Tachycardic heart rate and regular rhythm.  No murmurs, rubs, or gallops; pulses 2+ bilaterally radially   Lungs: Clear to auscultation with good air movement bilaterally.  No wheezes, rhonchi, or rales.   Abdomen: BS present; soft; NTND; no rebound, " guarding, or rigidity.  No organomegaly or pulsatile mass; no CVAT   Extremities: COLLINS x 4; no e/o trauma; no pedal edema  Neurologic: CNs III-XII grossly intact; speech clear; distal sensation intact; strength 5/5 UE/LEs  Psychiatric: Appropriate affect, normal mood  Pelvic:  Normal external female genitalia without lesions, scant amount of dark blood in the vault without clots, cervix is closed and without any tenderness or lesions. Uterus and adnexa are without masses, nontender.  LABS  Results for orders placed or performed during the hospital encounter of 03/10/17   CBC WITH DIFFERENTIAL   Result Value Ref Range    WBC 6.8 4.8 - 10.8 K/uL    RBC 5.24 4.20 - 5.40 M/uL    Hemoglobin 13.4 12.0 - 16.0 g/dL    Hematocrit 40.7 37.0 - 47.0 %    MCV 77.7 (L) 81.4 - 97.8 fL    MCH 25.6 (L) 27.0 - 33.0 pg    MCHC 32.9 (L) 33.6 - 35.0 g/dL    RDW 38.5 35.9 - 50.0 fL    Platelet Count 186 164 - 446 K/uL    MPV 10.7 9.0 - 12.9 fL    Neutrophils-Polys 48.90 44.00 - 72.00 %    Lymphocytes 37.60 22.00 - 41.00 %    Monocytes 12.00 0.00 - 13.40 %    Eosinophils 0.90 0.00 - 6.90 %    Basophils 0.30 0.00 - 1.80 %    Immature Granulocytes 0.30 0.00 - 0.90 %    Nucleated RBC 0.00 /100 WBC    Neutrophils (Absolute) 3.30 2.00 - 7.15 K/uL    Lymphs (Absolute) 2.54 1.00 - 4.80 K/uL    Monos (Absolute) 0.81 0.00 - 0.85 K/uL    Eos (Absolute) 0.06 0.00 - 0.51 K/uL    Baso (Absolute) 0.02 0.00 - 0.12 K/uL    Immature Granulocytes (abs) 0.02 0.00 - 0.11 K/uL    NRBC (Absolute) 0.00 K/uL   HCG QUANTITATIVE SERUM   Result Value Ref Range    Bhcg 06843.0 (H) 0.0 - 10.0 mIU/mL   RH TYPE FOR RHOGAM FROM E.D.   Result Value Ref Range    Emergency Department Rh Typing POS     Number Of Rh Doses Indicated ZERO    TSH   Result Value Ref Range    TSH <0.015 (L) 0.350 - 5.500 uIU/mL   FREE THYROXINE   Result Value Ref Range    Free T-4 2.83 (H) 0.58 - 1.64 ng/dL   POC UA   Result Value Ref Range    POC Color Linda     POC Appearance Cloudy (A)     POC  Glucose Negative Negative mg/dL    POC Ketones Negative Negative mg/dL    POC Specific Gravity 1.015 1.005-1.030    POC Blood Large (A) Negative    POC Urine PH 5.5 5.0-8.0    POC Protein Negative Negative mg/dL    POC Nitrites Negative Negative    POC Leukocyte Esterase Trace (A) Negative   POC URINE PREGNANCY   Result Value Ref Range    POC Urine Pregnancy Test Positive (A)          IMAGING  US-OB PELVIS TRANSVAGINAL   Final Result      Single living intrauterine pregnancy at 6 weeks 0 days gestation by ultrasound.          MEDICAL RECORD  I have reviewed patient's medical record and pertinent results are listed below.      COURSE & MEDICAL DECISION MAKING  I have reviewed any medical record information, laboratory studies and radiographic results as noted.    Damari Hansen is a 21 y.o. female who presents complaining of vaginal bleeding in the setting of a positive home pregnancy test.    Patient is tachycardic but I suspect this is due to her anxiety and tearfulness. She also reports a thyroid disorder that is not currently being treated. Patient appears to have mild exophthalmos, is tachycardic, and hypertensive.    Labs demonstrates Rh+ blood, and elevated hCG Quant of 12,235, and an undetectable TSH with an elevated free T4.    Ultrasound demonstrates a 6 week gestation.    3:26 PM  Paging Dr. Jasso    4:02 PM  I discussed the case and the request for earlier follow-up for the patient with Dr. Najera from GYN who is covering for Dr. Jasso. She agrees with the plan to start the patient on PTU and be seen next week. I called the patient on herself phone and she is aware that she should call Dr. Jasso's office on Monday to be seen sooner than the 23rd.      Pt's blood pressure was noted to be above 120/80 here in the ER.  Pt was informed and advised to follow up as an outpatient for recheck for possible dx/management of hypertension.      FINAL IMPRESSION  1. Vaginal bleeding    2. 6 weeks gestation of  General pregnancy    3. Hyperthyroidism        Electronically signed by: Pattie Palmer, 3/10/2017 12:45 PM

## 2022-12-19 ENCOUNTER — APPOINTMENT (OUTPATIENT)
Dept: ENDOCRINOLOGY | Facility: MEDICAL CENTER | Age: 27
End: 2022-12-19
Payer: COMMERCIAL

## 2022-12-19 NOTE — PROGRESS NOTES
Chief Complaint: Consult requested by Krystle Murphy M.D. for evaluation of the following conditions    HPI:   Damari Hansen is a 26 y.o. female     1.  Hyperthyroidism:  Diagnosed when she was 14 years old due to stress from life event  She stopped methimazole years after Dx, then s/s develop during both pregnancies    Currently taking methimazole 10 mg BID  SE of depression with methimazole, currently taking Prozac 20mg daily     She reports anxiousness, feeling excessive energy, and palpitations   She denies fatigue, weight gain, feeling cold and cold intolerance, constipation, and swelling.         Latest Reference Range & Units 8/2/22 08:58 9/12/22 09:17   TSH 0.380 - 5.330 uIU/mL <0.005 (L) <0.005 (L)   Free T-4 0.93 - 1.70 ng/dL 2.22 (H) 1.86 (H)       2.  Graves' disease:  NM scan done on 5/25/2021 showed a 5 hours uptake of 70% consistent with hyperthyroidism and Graves' disease    3. Vitamin D deficiency:  Levels will be evaluated    4. Grave's Eye Disease:   Reports eye pain, redeness  Denies gritty eye pain      5.  High risk medication use:  On methimazole for hyperthyroidism treatment    Patient's medications, allergies, and social histories were reviewed and updated as appropriate.      ROS:     CONS:     No fever, no chills, no weight loss, no fatigue   EYES:      No diplopia, no blurry vision, no redness of eyes, no swelling of eyelids   ENT:    No hearing loss, No ear pain, No sore throat, no dysphagia, no neck swelling   CV:     No chest pain, no palpitations, no claudication, no orthopnea, no PND   PULM:    No SOB, no cough, no hemoptysis, no wheezing    GI:   No nausea, no vomiting, no diarrhea, no constipation, no bloody stools   :  Passing urine well, no dysuria, no hematuria   ENDO:   No polyuria, no polydipsia, no heat intolerance, no cold intolerance   NEURO: No headaches, no dizziness, no convulsions, no tremors   MUSC:  No joint swellings, no arthralgias, no  myalgias, no weakness   SKIN:   No rash, no ulcers, no dry skin   PSYCH:   No depression, no anxiety, no difficulty sleeping       Past Medical History:  Patient Active Problem List    Diagnosis Date Noted    Nasal congestion 09/14/2021    Anxiety and depression 04/15/2021    IUD (intrauterine device) in place 08/11/2020    Non-toxic goiter 06/10/2020    Hyperthyroidism 02/13/2018    Obesity 02/13/2018    Pain in joint, lower leg 07/17/2008    Other meniscus derangements, unspecified lateral meniscus, unspecified knee 07/10/2008    Mild persistent asthma 09/17/2007    Asthma 09/14/2007    Cough 11/15/2006    Allergic rhinitis 11/15/2006       Past Surgical History:  Past Surgical History:   Procedure Laterality Date    TONSILLECTOMY          Allergies:  Amoxicillin     Current Medications:    Current Outpatient Medications:     methimazole (TAPAZOLE) 10 MG Tab, Take 1 Tablet by mouth 2 times a day., Disp: 180 Tablet, Rfl: 6    traZODone (DESYREL) 50 MG Tab, Take 1 Tablet by mouth every evening., Disp: 30 Tablet, Rfl: 3    FLUoxetine (PROZAC) 20 MG Cap, Take 1 Capsule by mouth every day., Disp: 90 Capsule, Rfl: 3    fluticasone (FLONASE) 50 MCG/ACT nasal spray, Administer 1 Spray into affected nostril(S) every day., Disp: 16 g, Rfl: 3    loratadine (CLARITIN) 10 MG Tab, Take 10 mg by mouth every day., Disp: , Rfl:     Social History:  Social History     Socioeconomic History    Marital status: Single     Spouse name: Not on file    Number of children: Not on file    Years of education: Not on file    Highest education level: Not on file   Occupational History    Not on file   Tobacco Use    Smoking status: Never    Smokeless tobacco: Never   Vaping Use    Vaping Use: Never used   Substance and Sexual Activity    Alcohol use: Yes     Comment: rare    Drug use: No    Sexual activity: Yes     Partners: Male     Birth control/protection: I.U.D.   Other Topics Concern    Not on file   Social History Narrative    Not on  file     Social Determinants of Health     Financial Resource Strain: Not on file   Food Insecurity: Not on file   Transportation Needs: Not on file   Physical Activity: Not on file   Stress: Not on file   Social Connections: Not on file   Intimate Partner Violence: Not on file   Housing Stability: Not on file        Family History:   Family History   Problem Relation Age of Onset    Cancer Father         melanoma    Cancer Paternal Grandfather         lymphoma    Dementia Mother     Cancer Maternal Aunt         breast cancer    Dementia Maternal Grandmother     Dementia Maternal Grandfather     No Known Problems Paternal Grandmother     Cancer Maternal Aunt         lymphmoa         PHYSICAL EXAM:   Vital signs: There were no vitals taken for this visit.  GENERAL: Well-developed, well-nourished  in no apparent distress.   EYE: No ocular and eyelid asymmetry, Anicteric sclerae,  PERRL, No exophthalmos or lidlag  HENT: Hearing grossly intact, Normocephalic, atraumatic.   NECK: Supple. Trachea midline. thyroid enlarged, smooth, nontender, no nodules  CARDIOVASCULAR: Regular rate and rhythm. No murmurs, rubs, or gallops.   LUNGS: Clear to auscultation bilaterally   EXTREMITIES: No clubbing, cyanosis, or edema.   NEUROLOGICAL: Cranial nerves II-XII are grossly intact   Symmetric reflexes at the patella no proximal muscle weakness, No visible tremor with both outstretched hands  LYMPH: No cervical, supraclavicular,  adenopathy palpated.   SKIN: No rashes, lesions. Turgor is normal.    Labs:  Lab Results   Component Value Date/Time    WBC 9.9 03/30/2022 06:51 PM    RBC 5.31 03/30/2022 06:51 PM    HEMOGLOBIN 14.7 03/30/2022 06:51 PM    MCV 82.5 03/30/2022 06:51 PM    MCH 27.7 03/30/2022 06:51 PM    MCHC 33.6 03/30/2022 06:51 PM    RDW 38.4 03/30/2022 06:51 PM    MPV 10.4 03/30/2022 06:51 PM       Lab Results   Component Value Date/Time    SODIUM 139 08/02/2022 08:58 AM    POTASSIUM 3.9 08/02/2022 08:58 AM    CHLORIDE 106  08/02/2022 08:58 AM    CO2 23 08/02/2022 08:58 AM    ANION 10.0 08/02/2022 08:58 AM    GLUCOSE 90 08/02/2022 08:58 AM    BUN 6 (L) 08/02/2022 08:58 AM    CREATININE 0.49 (L) 08/02/2022 08:58 AM    CALCIUM 8.5 08/02/2022 08:58 AM    ASTSGOT 30 08/02/2022 08:58 AM    ALTSGPT 35 08/02/2022 08:58 AM    TBILIRUBIN 0.5 08/02/2022 08:58 AM    ALBUMIN 4.4 08/02/2022 08:58 AM    TOTPROTEIN 7.1 08/02/2022 08:58 AM    GLOBULIN 2.7 08/02/2022 08:58 AM    AGRATIO 1.6 08/02/2022 08:58 AM       Lab Results   Component Value Date/Time    TSHULTRASEN <0.005 (L) 09/12/2022 0917     Lab Results   Component Value Date/Time    FREET4 1.86 (H) 09/12/2022 0917       Imaging:      ASSESSMENT/PLAN:   1. Hyperthyroidism  Clinically unstable  Biochemically unstable    She has been taking methimazole 10 mg daily for a month now-her levels are mildly improved  She reports her methimazole has the side effects of depression-her primary care provider prescribed Prozac with relief  We will increase methimazole from 10 mg daily to 10 mg twice a day-she can take both pills in the morning for easy to remember    - methimazole (TAPAZOLE) 10 MG Tab; Take 1 Tablet by mouth 2 times a day.  Dispense: 180 Tablet; Refill: 6  - Referral to Ophthalmology  - TSH; Future  - FREE THYROXINE; Future  - T3 FREE; Future  - Comp Metabolic Panel; Future    2. Vitamin D deficiency  We will evaluate levels  - VITAMIN D,25 HYDROXY (DEFICIENCY); Future    3. Graves' eye disease  Unstable but undetermined whether she is a candidate for treatment unknown  I have referred her to ophthalmology for further evaluation     4.  High risk medication use:  On methimazole for hyperthyroidism treatment  Please stop methimazole if you develop any signs and symptoms of infection such as fever, malaise, COVID and resume when you are better    Disposition: Make an appointment to follow-up in 3 months  Do your blood work 1 week prior to next appointment      Thank you kindly for allowing  me to participate in the thyroid care plan for this patient.    Flo Parker, [email or web address].   09/15/22    CC:   Krystle Murphy M.D.

## 2023-09-25 ENCOUNTER — APPOINTMENT (OUTPATIENT)
Dept: RADIOLOGY | Facility: MEDICAL CENTER | Age: 28
End: 2023-09-25
Attending: EMERGENCY MEDICINE
Payer: COMMERCIAL

## 2023-09-25 ENCOUNTER — HOSPITAL ENCOUNTER (EMERGENCY)
Facility: MEDICAL CENTER | Age: 28
End: 2023-09-25
Attending: EMERGENCY MEDICINE
Payer: COMMERCIAL

## 2023-09-25 VITALS
SYSTOLIC BLOOD PRESSURE: 105 MMHG | DIASTOLIC BLOOD PRESSURE: 55 MMHG | RESPIRATION RATE: 17 BRPM | OXYGEN SATURATION: 92 % | WEIGHT: 293 LBS | HEIGHT: 69 IN | BODY MASS INDEX: 43.4 KG/M2 | HEART RATE: 62 BPM | TEMPERATURE: 97.8 F

## 2023-09-25 DIAGNOSIS — R07.81 PLEURITIC CHEST PAIN: ICD-10-CM

## 2023-09-25 DIAGNOSIS — T14.8XXA INFECTED WOUND: ICD-10-CM

## 2023-09-25 DIAGNOSIS — L08.9 INFECTED WOUND: ICD-10-CM

## 2023-09-25 DIAGNOSIS — L50.0 ALLERGIC URTICARIA: ICD-10-CM

## 2023-09-25 LAB
ALBUMIN SERPL BCP-MCNC: 3.8 G/DL (ref 3.2–4.9)
ALBUMIN/GLOB SERPL: 1.3 G/DL
ALP SERPL-CCNC: 89 U/L (ref 30–99)
ALT SERPL-CCNC: 32 U/L (ref 2–50)
ANION GAP SERPL CALC-SCNC: 12 MMOL/L (ref 7–16)
AST SERPL-CCNC: 19 U/L (ref 12–45)
BASOPHILS # BLD AUTO: 0.6 % (ref 0–1.8)
BASOPHILS # BLD: 0.05 K/UL (ref 0–0.12)
BILIRUB SERPL-MCNC: 0.3 MG/DL (ref 0.1–1.5)
BUN SERPL-MCNC: 9 MG/DL (ref 8–22)
CALCIUM ALBUM COR SERPL-MCNC: 8.6 MG/DL (ref 8.5–10.5)
CALCIUM SERPL-MCNC: 8.4 MG/DL (ref 8.4–10.2)
CHLORIDE SERPL-SCNC: 107 MMOL/L (ref 96–112)
CO2 SERPL-SCNC: 23 MMOL/L (ref 20–33)
CREAT SERPL-MCNC: 0.68 MG/DL (ref 0.5–1.4)
D DIMER PPP IA.FEU-MCNC: 0.94 UG/ML (FEU) (ref 0–0.5)
EKG IMPRESSION: NORMAL
EOSINOPHIL # BLD AUTO: 0.78 K/UL (ref 0–0.51)
EOSINOPHIL NFR BLD: 8.6 % (ref 0–6.9)
ERYTHROCYTE [DISTWIDTH] IN BLOOD BY AUTOMATED COUNT: 38.3 FL (ref 35.9–50)
GFR SERPLBLD CREATININE-BSD FMLA CKD-EPI: 120 ML/MIN/1.73 M 2
GLOBULIN SER CALC-MCNC: 3 G/DL (ref 1.9–3.5)
GLUCOSE SERPL-MCNC: 98 MG/DL (ref 65–99)
HCG SERPL QL: NEGATIVE
HCT VFR BLD AUTO: 43.6 % (ref 37–47)
HGB BLD-MCNC: 14 G/DL (ref 12–16)
IMM GRANULOCYTES # BLD AUTO: 0.01 K/UL (ref 0–0.11)
IMM GRANULOCYTES NFR BLD AUTO: 0.1 % (ref 0–0.9)
LIPASE SERPL-CCNC: 37 U/L (ref 11–82)
LYMPHOCYTES # BLD AUTO: 3.43 K/UL (ref 1–4.8)
LYMPHOCYTES NFR BLD: 38 % (ref 22–41)
MCH RBC QN AUTO: 26.7 PG (ref 27–33)
MCHC RBC AUTO-ENTMCNC: 32.1 G/DL (ref 32.2–35.5)
MCV RBC AUTO: 83 FL (ref 81.4–97.8)
MONOCYTES # BLD AUTO: 0.63 K/UL (ref 0–0.85)
MONOCYTES NFR BLD AUTO: 7 % (ref 0–13.4)
NEUTROPHILS # BLD AUTO: 4.13 K/UL (ref 1.82–7.42)
NEUTROPHILS NFR BLD: 45.7 % (ref 44–72)
NRBC # BLD AUTO: 0 K/UL
NRBC BLD-RTO: 0 /100 WBC (ref 0–0.2)
PLATELET # BLD AUTO: 239 K/UL (ref 164–446)
PMV BLD AUTO: 10.3 FL (ref 9–12.9)
POTASSIUM SERPL-SCNC: 3.7 MMOL/L (ref 3.6–5.5)
PROT SERPL-MCNC: 6.8 G/DL (ref 6–8.2)
RBC # BLD AUTO: 5.25 M/UL (ref 4.2–5.4)
SODIUM SERPL-SCNC: 142 MMOL/L (ref 135–145)
TROPONIN T SERPL-MCNC: <6 NG/L (ref 6–19)
WBC # BLD AUTO: 9 K/UL (ref 4.8–10.8)

## 2023-09-25 PROCEDURE — 96374 THER/PROPH/DIAG INJ IV PUSH: CPT

## 2023-09-25 PROCEDURE — 84484 ASSAY OF TROPONIN QUANT: CPT

## 2023-09-25 PROCEDURE — 85379 FIBRIN DEGRADATION QUANT: CPT

## 2023-09-25 PROCEDURE — 84703 CHORIONIC GONADOTROPIN ASSAY: CPT

## 2023-09-25 PROCEDURE — 83690 ASSAY OF LIPASE: CPT

## 2023-09-25 PROCEDURE — 93005 ELECTROCARDIOGRAM TRACING: CPT | Performed by: EMERGENCY MEDICINE

## 2023-09-25 PROCEDURE — 85025 COMPLETE CBC W/AUTO DIFF WBC: CPT

## 2023-09-25 PROCEDURE — 94760 N-INVAS EAR/PLS OXIMETRY 1: CPT

## 2023-09-25 PROCEDURE — 80053 COMPREHEN METABOLIC PANEL: CPT

## 2023-09-25 PROCEDURE — 96375 TX/PRO/DX INJ NEW DRUG ADDON: CPT

## 2023-09-25 PROCEDURE — 99284 EMERGENCY DEPT VISIT MOD MDM: CPT

## 2023-09-25 PROCEDURE — 71275 CT ANGIOGRAPHY CHEST: CPT

## 2023-09-25 PROCEDURE — 700105 HCHG RX REV CODE 258: Performed by: EMERGENCY MEDICINE

## 2023-09-25 PROCEDURE — 700111 HCHG RX REV CODE 636 W/ 250 OVERRIDE (IP): Performed by: EMERGENCY MEDICINE

## 2023-09-25 PROCEDURE — 700117 HCHG RX CONTRAST REV CODE 255: Performed by: EMERGENCY MEDICINE

## 2023-09-25 PROCEDURE — 71045 X-RAY EXAM CHEST 1 VIEW: CPT

## 2023-09-25 PROCEDURE — 36415 COLL VENOUS BLD VENIPUNCTURE: CPT

## 2023-09-25 RX ORDER — SODIUM CHLORIDE 9 MG/ML
1000 INJECTION, SOLUTION INTRAVENOUS ONCE
Status: COMPLETED | OUTPATIENT
Start: 2023-09-25 | End: 2023-09-25

## 2023-09-25 RX ORDER — ONDANSETRON 2 MG/ML
4 INJECTION INTRAMUSCULAR; INTRAVENOUS ONCE
Status: COMPLETED | OUTPATIENT
Start: 2023-09-25 | End: 2023-09-25

## 2023-09-25 RX ORDER — MORPHINE SULFATE 4 MG/ML
4 INJECTION INTRAVENOUS ONCE
Status: COMPLETED | OUTPATIENT
Start: 2023-09-25 | End: 2023-09-25

## 2023-09-25 RX ORDER — SULFAMETHOXAZOLE AND TRIMETHOPRIM 800; 160 MG/1; MG/1
1 TABLET ORAL 2 TIMES DAILY
Qty: 14 TABLET | Refills: 0 | Status: ACTIVE | OUTPATIENT
Start: 2023-09-25 | End: 2023-10-02

## 2023-09-25 RX ORDER — KETOROLAC TROMETHAMINE 30 MG/ML
15 INJECTION, SOLUTION INTRAMUSCULAR; INTRAVENOUS ONCE
Status: COMPLETED | OUTPATIENT
Start: 2023-09-25 | End: 2023-09-25

## 2023-09-25 RX ADMIN — MORPHINE SULFATE 4 MG: 4 INJECTION INTRAVENOUS at 04:19

## 2023-09-25 RX ADMIN — IOHEXOL 59 ML: 350 INJECTION, SOLUTION INTRAVENOUS at 06:01

## 2023-09-25 RX ADMIN — ONDANSETRON 4 MG: 2 INJECTION INTRAMUSCULAR; INTRAVENOUS at 04:18

## 2023-09-25 RX ADMIN — SODIUM CHLORIDE 1000 ML: 9 INJECTION, SOLUTION INTRAVENOUS at 04:18

## 2023-09-25 RX ADMIN — FAMOTIDINE 20 MG: 10 INJECTION, SOLUTION INTRAVENOUS at 04:18

## 2023-09-25 RX ADMIN — KETOROLAC TROMETHAMINE 15 MG: 30 INJECTION, SOLUTION INTRAMUSCULAR; INTRAVENOUS at 05:03

## 2023-09-25 ASSESSMENT — FIBROSIS 4 INDEX: FIB4 SCORE: 0.67

## 2023-09-25 ASSESSMENT — PAIN DESCRIPTION - PAIN TYPE: TYPE: ACUTE PAIN

## 2023-09-25 NOTE — ED PROVIDER NOTES
ED Provider Note    CHIEF COMPLAINT  Chief Complaint   Patient presents with    Chest Pain    N/V     Pt Aox4, ambulatory, pt woke up at 3 am with midsternal chest pain, NV. Denies SOB, abd pain, weakness and dizziness. Took advil 2 tabs with no relief       EXTERNAL RECORDS REVIEWED  Outpatient Notes reviewed office visit progress note dated September 30, 2022 by Dr. Murphy.  Patient seen for follow-up .  History of hypothyroidism, elevated BMI.  Currently on methimazole.    HPI/ROS  LIMITATION TO HISTORY   Select: : None  OUTSIDE HISTORIAN(S):  None available    Damari Hansen is a 27 y.o. female who presents for evaluation of sharp stabbing pain to the chest.  Patient notes this woke her from sleep about an hour prior to arrival.  She has had somewhat similar symptoms in the past she notes more so in the evening but has not had an assessed.  Pain is sharp and stabbing, localized to the mid sternum at the center of the chest with radiation to the upper back between the shoulder blades.  Worse with movement and deep respiration, no exertional component.  No abdominal pain, nausea, no vomiting.  No fever, no dysuria, no hematuria.  Took Aleve prior to arrival with no improvement.  She notes mother has history of gallbladder disease, she spoke with her mother regarding the pain and her mother recommended she go to the emergency department to be assessed.  Non-smoker, not a diabetic, notes no history of hypertension or dyslipidemia.    PAST MEDICAL HISTORY   has a past medical history of Allergy, Asthma, Hyperthyroidism, Migraine, and Patient currently pregnant (6/10/2020).    SURGICAL HISTORY   has a past surgical history that includes tonsillectomy.    FAMILY HISTORY  Family History   Problem Relation Age of Onset    Cancer Father         melanoma    Cancer Paternal Grandfather         lymphoma    Dementia Mother     Cancer Maternal Aunt         breast cancer    Dementia Maternal Grandmother      "Dementia Maternal Grandfather     No Known Problems Paternal Grandmother     Cancer Maternal Aunt         lymphmoa   Gallbladder disease in mother, no coronary artery disease in immediate family.    SOCIAL HISTORY  Social History     Tobacco Use    Smoking status: Never    Smokeless tobacco: Never   Vaping Use    Vaping Use: Never used   Substance and Sexual Activity    Alcohol use: Yes     Comment: rare    Drug use: No    Sexual activity: Yes     Partners: Male     Birth control/protection: I.U.D.       CURRENT MEDICATIONS  Home Medications       Reviewed by Luz Luis R.N. (Registered Nurse) on 09/25/23 at 0410  Med List Status: Partial     Medication Last Dose Status   FLUoxetine (PROZAC) 20 MG Cap  Active   fluticasone (FLONASE) 50 MCG/ACT nasal spray  Active   loratadine (CLARITIN) 10 MG Tab  Active   methimazole (TAPAZOLE) 10 MG Tab  Active   traZODone (DESYREL) 50 MG Tab  Active                    ALLERGIES  Allergies   Allergen Reactions    Amoxicillin Hives     Rxn - 2016       PHYSICAL EXAM  VITAL SIGNS: /76   Pulse 64   Temp 36.5 °C (97.7 °F) (Temporal)   Resp (!) 37   Ht 1.753 m (5' 9\")   Wt (!) 143 kg (315 lb 4.1 oz)   SpO2 94%   BMI 46.56 kg/m²    General: Alert, mild acute distress, appears uncomfortable  Skin: Warm, dry, normal for ethnicity  Head: Normocephalic, atraumatic  Neck: Trachea midline, no tenderness  Eye: PERRL, normal conjunctiva  ENMT: Oral mucosa pink and mildly dry, no pharyngeal erythema or exudate  Cardiovascular: S1, S2, mildly tachycardic, otherwise regular rate and rhythm, No murmur, Normal peripheral perfusion  Respiratory: Lungs CTA, respirations are non-labored, breath sounds are equal  Gastrointestinal: Soft, nontender, non distended.  Negative Simpson sign, no guarding, no rebound, no rigidity.  Bowel sounds are mildly hypoactive.  Musculoskeletal: No swelling, no deformity.  No reproducible tenderness to the sternum or the thoracic region the " back.   Neurological: Alert and oriented to person, place, time, and situation  Lymphatics: No lymphadenopathy  Psychiatric: Cooperative, appropriate mood & affect     DIAGNOSTIC STUDIES / PROCEDURES  EKG  I have independently interpreted this EKG  EKG Interpretation    Interpreted by emergency department physician    Rhythm: normal sinus   Rate: 93  Axis: normal  Ectopy: none  Conduction: normal  ST Segments: no acute change  T Waves: no acute change  Q Waves: none    Clinical Impression: no acute changes     LABS  Results for orders placed or performed during the hospital encounter of 09/25/23   CBC w/ Differential   Result Value Ref Range    WBC 9.0 4.8 - 10.8 K/uL    RBC 5.25 4.20 - 5.40 M/uL    Hemoglobin 14.0 12.0 - 16.0 g/dL    Hematocrit 43.6 37.0 - 47.0 %    MCV 83.0 81.4 - 97.8 fL    MCH 26.7 (L) 27.0 - 33.0 pg    MCHC 32.1 (L) 32.2 - 35.5 g/dL    RDW 38.3 35.9 - 50.0 fL    Platelet Count 239 164 - 446 K/uL    MPV 10.3 9.0 - 12.9 fL    Neutrophils-Polys 45.70 44.00 - 72.00 %    Lymphocytes 38.00 22.00 - 41.00 %    Monocytes 7.00 0.00 - 13.40 %    Eosinophils 8.60 (H) 0.00 - 6.90 %    Basophils 0.60 0.00 - 1.80 %    Immature Granulocytes 0.10 0.00 - 0.90 %    Nucleated RBC 0.00 0.00 - 0.20 /100 WBC    Neutrophils (Absolute) 4.13 1.82 - 7.42 K/uL    Lymphs (Absolute) 3.43 1.00 - 4.80 K/uL    Monos (Absolute) 0.63 0.00 - 0.85 K/uL    Eos (Absolute) 0.78 (H) 0.00 - 0.51 K/uL    Baso (Absolute) 0.05 0.00 - 0.12 K/uL    Immature Granulocytes (abs) 0.01 0.00 - 0.11 K/uL    NRBC (Absolute) 0.00 K/uL   Complete Metabolic Panel (CMP)   Result Value Ref Range    Sodium 142 135 - 145 mmol/L    Potassium 3.7 3.6 - 5.5 mmol/L    Chloride 107 96 - 112 mmol/L    Co2 23 20 - 33 mmol/L    Anion Gap 12.0 7.0 - 16.0    Glucose 98 65 - 99 mg/dL    Bun 9 8 - 22 mg/dL    Creatinine 0.68 0.50 - 1.40 mg/dL    Calcium 8.4 8.4 - 10.2 mg/dL    Correct Calcium 8.6 8.5 - 10.5 mg/dL    AST(SGOT) 19 12 - 45 U/L    ALT(SGPT) 32 2 - 50 U/L     Alkaline Phosphatase 89 30 - 99 U/L    Total Bilirubin 0.3 0.1 - 1.5 mg/dL    Albumin 3.8 3.2 - 4.9 g/dL    Total Protein 6.8 6.0 - 8.2 g/dL    Globulin 3.0 1.9 - 3.5 g/dL    A-G Ratio 1.3 g/dL   Troponin - STAT Once   Result Value Ref Range    Troponin T <6 6 - 19 ng/L   D-Dimer (only helpful in low pre-test probability wells critieria. Do not order if patient ruled out by PERC criteria. See Weblinks at top of Labs section)   Result Value Ref Range    D-Dimer 0.94 (H) 0.00 - 0.50 ug/mL (FEU)   HCG QUAL SERUM   Result Value Ref Range    Beta-Hcg Qualitative Serum Negative Negative   LIPASE   Result Value Ref Range    Lipase 37 11 - 82 U/L   ESTIMATED GFR   Result Value Ref Range    GFR (CKD-EPI) 120 >60 mL/min/1.73 m 2   EKG   Result Value Ref Range    Report       [facility] Emergency Dept[facility]    Test Date:  2023  Pt Name:    SOFIA HIGGINBOTHAM              Department[facility]  MRN:        [id number]                      [facility]  Gender:     Female                       Technician: [id number]  :        [id number]                   Requested By:AMINATA MILLER  Order #:    [id number]                    Reading MD:    Measurements  Intervals                                Axis  Rate:       93                           P:          28  NE:         147                          QRS:        45  QRSD:       99                           T:          23  QT:         374  QTc:        466    Interpretive Statements  Sinus rhythm  Compared to ECG 2018 20:02:03  Sinus tachycardia no longer present          RADIOLOGY  I have independently interpreted the diagnostic imaging associated with this visit and am waiting the final reading from the radiologist.   My preliminary interpretation is as follows: No pneumothorax, no cardiomegaly  Radiologist interpretation:   DX-CHEST-PORTABLE (1 VIEW)   Final Result         1.  No acute cardiopulmonary disease.      CT-CTA CHEST PULMONARY ARTERY W/  RECONS    (Results Pending)        COURSE & MEDICAL DECISION MAKING    ED Observation Status? Yes; I am placing the patient in to an observation status due to a diagnostic uncertainty as well as therapeutic intensity. Patient placed in observation status at 4:07 AM, 9/25/2023.     Observation plan is as follows: Patient medicated with morphine 4 mg IV, Zofran 4 mg IV, famotidine 20 mg IV, and 1 L of normal saline.  Metabolic/cardiac work-up as well as D-dimer, lipase, and chest x-ray will be obtained.  Bedside ultrasound performed by myself.  Differential diagnosis at this point includes but is not restricted to pulmonary embolism, gastritis, GERD, musculoskeletal pain, ACS, biliary colic    0456: Patient reassessed, pain is improved.  I have updated her with reassuring studies thus far.  Performed bedside ultrasound demonstrating normal-appearing gallbladder without evidence of cholelithiasis.  Awaiting D-dimer result at this time.      INITIAL ASSESSMENT, COURSE AND PLAN  Care Narrative: This patient is a very pleasant 27-year-old female presents for evaluation of sharp stabbing chest pain with pleuritic component.  She is tachycardic and tachypneic, pain is not reproducible on the exam.  PERC rule can thusly not be applied in this case, a D-dimer will be obtained to evaluate as well as cardiac work-up for etiology of her pain and.  EKG is reassuring, she thankfully has few cardiovascular risk factors beyond her BMI.  Studies demonstrate otherwise reassuring work-up.  As D-dimer is mildly out of range high there is indication nonetheless for CTA of the chest to evaluate further.  HYDRATION: Based on the patient's presentation of Dehydration the patient was given IV fluids. IV Hydration was used because oral hydration was not as rapid as required. Upon recheck following hydration, the patient was doing better, tachycardia resolved, current rate is 64.      ADDITIONAL PROBLEM LIST  Pleuritic chest pain, sinus  tachycardia  DISPOSITION AND DISCUSSIONS  I have discussed management of the patient with the following physicians and MEENA's:  Spoke with my partner Dr. Michi Castro who will follow up on CT for final disposition.     Discussion of management with other QHP or appropriate source(s): None     Escalation of care considered, and ultimately not performed:NA    Barriers to care at this time, including but not limited to:  NA .     Decision tools and prescription drugs considered including, but not limited to: HEART Score 1, low risk stratification .  PERC rule cannot be used to clinically rule out pulmonary embolism given tachycardic and tachypneic.  As such D-dimer will be obtained.    FINAL DIAGNOSIS  1. Pleuritic chest pain    2. Allergic urticaria    3. Infected wound           Electronically signed by: Ericka Rogers M.D., 9/25/2023 3:51 AM

## 2023-09-25 NOTE — ED NOTES
Vital signs taken and recorded. IV removed. Discharge in stable condition ambulatory accompanied by mother. Health teachings given to patient and family with full understanding of the information given. No personal belongings left.

## 2023-09-25 NOTE — ED TRIAGE NOTES
"Chief Complaint   Patient presents with    Chest Pain    N/V     Pt Aox4, ambulatory, pt woke up at 3 am with midsternal chest pain, NV. Denies SOB, abd pain, weakness and dizziness. Took advil 2 tabs with no relief     /79   Pulse (!) 104   Temp 36.5 °C (97.7 °F) (Temporal)   Resp (!) 24   Ht 1.753 m (5' 9\")   Wt (!) 143 kg (315 lb 4.1 oz)   SpO2 96%   BMI 46.56 kg/m²     "

## 2023-09-25 NOTE — DISCHARGE SUMMARY
"  ED Observation Discharge Summary    Patient:Damari Hansen  Patient : 1995  Patient MRN: [id number]  Patient PCP: Krystle Murphy M.D.    Admit Date: 2023  Discharge Date and Time: 23 6:23 AM  Discharge Diagnosis:   1. Pleuritic chest pain        2. Allergic urticaria        3. Infected wound  sulfamethoxazole-trimethoprim (BACTRIM DS) 800-160 MG tablet          Discharge Attending: Michi Gamez M.D.  Discharge Service: ED Observation    ED Course  Damari is a 27 y.o. female who was evaluated at [facility] patient presented to [facility] emergency department complaints of chest pain.  The patient had an EKG laboratory studies and a full work-up to include a CT scan of the chest to rule out pulmonary embolism.  Only positive was a D-dimer which prompted the CT scan.  CT scan was negative.  At this point the impression is that the chest pain is more chest wall in nature.  Is recommended OTC medications for treatment of this to include Tylenol and ibuprofen.    Discharge Exam:  /68   Pulse 82   Temp 36.5 °C (97.7 °F) (Temporal)   Resp (!) 25   Ht 1.753 m (5' 9\")   Wt (!) 143 kg (315 lb 4.1 oz)   SpO2 94%   BMI 46.56 kg/m² .    Constitutional: Awake and alert. Nontoxic  Psychiatric: Affect normal    Labs  Results for orders placed or performed during the hospital encounter of 23   CBC w/ Differential   Result Value Ref Range    WBC 9.0 4.8 - 10.8 K/uL    RBC 5.25 4.20 - 5.40 M/uL    Hemoglobin 14.0 12.0 - 16.0 g/dL    Hematocrit 43.6 37.0 - 47.0 %    MCV 83.0 81.4 - 97.8 fL    MCH 26.7 (L) 27.0 - 33.0 pg    MCHC 32.1 (L) 32.2 - 35.5 g/dL    RDW 38.3 35.9 - 50.0 fL    Platelet Count 239 164 - 446 K/uL    MPV 10.3 9.0 - 12.9 fL    Neutrophils-Polys 45.70 44.00 - 72.00 %    Lymphocytes 38.00 22.00 - 41.00 %    Monocytes 7.00 0.00 - 13.40 %    Eosinophils 8.60 (H) 0.00 - 6.90 %    Basophils 0.60 0.00 - 1.80 %    Immature Granulocytes 0.10 0.00 - 0.90 %    " Nucleated RBC 0.00 0.00 - 0.20 /100 WBC    Neutrophils (Absolute) 4.13 1.82 - 7.42 K/uL    Lymphs (Absolute) 3.43 1.00 - 4.80 K/uL    Monos (Absolute) 0.63 0.00 - 0.85 K/uL    Eos (Absolute) 0.78 (H) 0.00 - 0.51 K/uL    Baso (Absolute) 0.05 0.00 - 0.12 K/uL    Immature Granulocytes (abs) 0.01 0.00 - 0.11 K/uL    NRBC (Absolute) 0.00 K/uL   Complete Metabolic Panel (CMP)   Result Value Ref Range    Sodium 142 135 - 145 mmol/L    Potassium 3.7 3.6 - 5.5 mmol/L    Chloride 107 96 - 112 mmol/L    Co2 23 20 - 33 mmol/L    Anion Gap 12.0 7.0 - 16.0    Glucose 98 65 - 99 mg/dL    Bun 9 8 - 22 mg/dL    Creatinine 0.68 0.50 - 1.40 mg/dL    Calcium 8.4 8.4 - 10.2 mg/dL    Correct Calcium 8.6 8.5 - 10.5 mg/dL    AST(SGOT) 19 12 - 45 U/L    ALT(SGPT) 32 2 - 50 U/L    Alkaline Phosphatase 89 30 - 99 U/L    Total Bilirubin 0.3 0.1 - 1.5 mg/dL    Albumin 3.8 3.2 - 4.9 g/dL    Total Protein 6.8 6.0 - 8.2 g/dL    Globulin 3.0 1.9 - 3.5 g/dL    A-G Ratio 1.3 g/dL   Troponin - STAT Once   Result Value Ref Range    Troponin T <6 6 - 19 ng/L   D-Dimer (only helpful in low pre-test probability wells critieria. Do not order if patient ruled out by PERC criteria. See Weblinks at top of Labs section)   Result Value Ref Range    D-Dimer 0.94 (H) 0.00 - 0.50 ug/mL (FEU)   HCG QUAL SERUM   Result Value Ref Range    Beta-Hcg Qualitative Serum Negative Negative   LIPASE   Result Value Ref Range    Lipase 37 11 - 82 U/L   ESTIMATED GFR   Result Value Ref Range    GFR (CKD-EPI) 120 >60 mL/min/1.73 m 2   EKG   Result Value Ref Range    Report       [facility] Emergency Dept.    Test Date:  2023  Pt Name:    SOFIA HIGGINBOTHAM              Department: [facility]  MRN:        [id number]                      Room:       -ROOM 6  Gender:     Female                       Technician: [id number]  :        1995                   Requested By:AMINATA MILLER  Order #:    [id number]                    Reading  MD:    Measurements  Intervals                                Axis  Rate:       93                           P:          28  KS:         147                          QRS:        45  QRSD:       99                           T:          23  QT:         374  QTc:        466    Interpretive Statements  Sinus rhythm  Compared to ECG 02/12/2018 20:02:03  Sinus tachycardia no longer present         Radiology  CT-CTA CHEST PULMONARY ARTERY W/ RECONS   Final Result         1.  No pulmonary embolus appreciated.      DX-CHEST-PORTABLE (1 VIEW)   Final Result         1.  No acute cardiopulmonary disease.          Medications:   New Prescriptions    SULFAMETHOXAZOLE-TRIMETHOPRIM (BACTRIM DS) 800-160 MG TABLET    Take 1 Tablet by mouth 2 times a day for 7 days.       My final assessment includes  1. Pleuritic chest pain  As above      2. Allergic urticaria        3. Infected wound  sulfamethoxazole-trimethoprim (BACTRIM DS) 800-160 MG tablet            Upon Reevaluation, the patient's condition has: Improved; and will be discharged.    Patient discharged from ED Observation status at 6:26 AM (Time) 09/25/23 (Date).     Total time spent on this ED Observation discharge encounter is < 30 Minutes    Electronically signed by: Michi Gamez M.D., 9/25/2023 6:23 AM

## 2023-10-02 ENCOUNTER — HOSPITAL ENCOUNTER (OUTPATIENT)
Dept: LAB | Facility: MEDICAL CENTER | Age: 28
End: 2023-10-02
Attending: FAMILY MEDICINE
Payer: COMMERCIAL

## 2023-10-02 ENCOUNTER — OFFICE VISIT (OUTPATIENT)
Dept: MEDICAL GROUP | Facility: LAB | Age: 28
End: 2023-10-02
Payer: COMMERCIAL

## 2023-10-02 VITALS
TEMPERATURE: 97 F | RESPIRATION RATE: 12 BRPM | SYSTOLIC BLOOD PRESSURE: 100 MMHG | WEIGHT: 293 LBS | HEART RATE: 94 BPM | OXYGEN SATURATION: 93 % | DIASTOLIC BLOOD PRESSURE: 70 MMHG | BODY MASS INDEX: 43.4 KG/M2 | HEIGHT: 69 IN

## 2023-10-02 DIAGNOSIS — E05.90 HYPERTHYROIDISM: ICD-10-CM

## 2023-10-02 DIAGNOSIS — F32.A ANXIETY AND DEPRESSION: ICD-10-CM

## 2023-10-02 DIAGNOSIS — L30.9 DERMATITIS: ICD-10-CM

## 2023-10-02 DIAGNOSIS — Z23 NEED FOR VACCINATION: ICD-10-CM

## 2023-10-02 DIAGNOSIS — F41.9 ANXIETY AND DEPRESSION: ICD-10-CM

## 2023-10-02 LAB
T3 SERPL-MCNC: 148 NG/DL (ref 60–181)
T4 FREE SERPL-MCNC: 1.44 NG/DL (ref 0.93–1.7)
TSH SERPL DL<=0.005 MIU/L-ACNC: 0.01 UIU/ML (ref 0.38–5.33)

## 2023-10-02 PROCEDURE — 84439 ASSAY OF FREE THYROXINE: CPT

## 2023-10-02 PROCEDURE — 90471 IMMUNIZATION ADMIN: CPT | Performed by: FAMILY MEDICINE

## 2023-10-02 PROCEDURE — 3078F DIAST BP <80 MM HG: CPT | Performed by: FAMILY MEDICINE

## 2023-10-02 PROCEDURE — 90686 IIV4 VACC NO PRSV 0.5 ML IM: CPT | Performed by: FAMILY MEDICINE

## 2023-10-02 PROCEDURE — 36415 COLL VENOUS BLD VENIPUNCTURE: CPT

## 2023-10-02 PROCEDURE — 84443 ASSAY THYROID STIM HORMONE: CPT

## 2023-10-02 PROCEDURE — 84480 ASSAY TRIIODOTHYRONINE (T3): CPT

## 2023-10-02 PROCEDURE — 99214 OFFICE O/P EST MOD 30 MIN: CPT | Mod: 25 | Performed by: FAMILY MEDICINE

## 2023-10-02 PROCEDURE — 3074F SYST BP LT 130 MM HG: CPT | Performed by: FAMILY MEDICINE

## 2023-10-02 RX ORDER — FLUOXETINE HYDROCHLORIDE 20 MG/1
20 CAPSULE ORAL DAILY
Qty: 184 CAPSULE | Refills: 3 | Status: SHIPPED | OUTPATIENT
Start: 2023-10-02 | End: 2024-02-06 | Stop reason: SDUPTHER

## 2023-10-02 RX ORDER — TRIAMCINOLONE ACETONIDE 1 MG/G
OINTMENT TOPICAL
Qty: 80 G | Refills: 1 | Status: SHIPPED | OUTPATIENT
Start: 2023-10-02

## 2023-10-02 RX ORDER — METHIMAZOLE 10 MG/1
10 TABLET ORAL
Qty: 180 TABLET | Refills: 6 | Status: SHIPPED | OUTPATIENT
Start: 2023-10-02 | End: 2024-02-06 | Stop reason: SDUPTHER

## 2023-10-02 ASSESSMENT — FIBROSIS 4 INDEX: FIB4 SCORE: 0.41

## 2023-10-02 ASSESSMENT — PATIENT HEALTH QUESTIONNAIRE - PHQ9: CLINICAL INTERPRETATION OF PHQ2 SCORE: 0

## 2023-10-02 NOTE — PROGRESS NOTES
Subjective:     Chief Complaint   Patient presents with    Follow-Up         HPI:   Damari presents today with ER follow up.  Was seen for sharp chest pain.  No diagnosis was obtained.  Negative CTPA, negative cardiac work-up.  CBC did show elevated eosinophils.  D-dimer was positive but again CTPA was negative for clots.    Went through some personal stuff, and stopped all meds.   She does report increased anxiety. This can cause some chest pain at times.   Once she falls asleep shes ok. She will get 7-8 hours of sleep most nights.     Even when on meds in the past anxiety was bad.   Has seen psychiatry for very long ago after her father .  Has not seen anyone since.  She does report that when she was on the fluoxetine it did help a little bit.      Skin:   Derm appt . [facility].   Has also been having rash on her lower legs and some itching on her upper arms as well as trunk.  She does have a dermatology appointment upcoming.  She was put on Bactrim via the ED due to potential for infection.  She does report things did improve but not completely.  Rash is very itchy.  She does have some swelling in the lower legs and feet as well.    Current Outpatient Medications Ordered in Epic   Medication Sig Dispense Refill    methimazole (TAPAZOLE) 10 MG Tab Take 1 Tablet by mouth 2 times a day. 180 Tablet 6    FLUoxetine (PROZAC) 20 MG Cap Take 1 Capsule by mouth every day. 184 Capsule 3    triamcinolone acetonide (KENALOG) 0.1 % Ointment Pea sized amount to affected area twice daily. 80 g 1     No current [facility]-ordered facility-administered medications on file.         ROS:  Gen: no fevers/chills, no changes in weight  Eyes: no changes in vision  ENT: no sore throat, no hearing loss, no bloody nose  Pulm: no sob, no cough  CV: no chest pain, no palpitations  GI: no nausea/vomiting, no diarrhea  : no dysuria  MSk: no myalgias  Skin: no rash  Neuro: no headaches, no numbness/tingling  Heme/Lymph: no  "easy bruising      Objective:     Exam:  /70 (BP Location: Right arm, Patient Position: Sitting, BP Cuff Size: Adult long)   Pulse 94   Temp 36.1 °C (97 °F)   Resp 12   Ht 1.753 m (5' 9\")   Wt (!) 142 kg (312 lb)   SpO2 93%   BMI 46.07 kg/m²  Body mass index is 46.07 kg/m².    Gen: Alert and oriented, No apparent distress.  Neck: Neck is supple without lymphadenopathy.  Lungs: Normal effort, CTA bilaterally, no wheezes, rhonchi, or rales  CV: Regular rate and rhythm. No murmurs, rubs, or gallops.  Ext: No clubbing, cyanosis, edema.  Skin: Very tense edema, nonpitting in the lower legs and feet.  There is evidence of excoriation as well as open ulcers and sores along with very dry skin and erythematous rough patches.    Assessment & Plan:     27 y.o. female with the following -     1. Need for vaccination    - INFLUENZA VACCINE QUAD INJ (PF)    2. Hyperthyroidism  Discussed getting back on her medications slowly.  We will get her on her methimazole as well as check her thyroid today  - TSH; Future  - FREE THYROXINE; Future  - TRIIDOTHYRONINE; Future  - methimazole (TAPAZOLE) 10 MG Tab; Take 1 Tablet by mouth 2 times a day.  Dispense: 180 Tablet; Refill: 6    3. Anxiety and depression  Discussed referral to psychiatry.  We did discuss going back on her fluoxetine at 20 for 2 weeks but then increasing to 40 thereafter.  - Referral to Psychiatry  - FLUoxetine (PROZAC) 20 MG Cap; Take 1 Capsule by mouth every day.  Dispense: 184 Capsule; Refill: 3    4. Dermatitis  Discussed dermatitis possibly being related to thyroid issues but also could be from the complete different like sun exposure.  She does have a dermatology visit so we will go ahead and try to treat some of the itching with the triamcinolone ointment.  She will let us know if things are getting worse with regard to infection in terms of oozing, bleeding, etc.  - triamcinolone acetonide (KENALOG) 0.1 % Ointment; Pea sized amount to affected area " twice daily.  Dispense: 80 g; Refill: 1    We will definitely follow her up in the next 4 weeks to discuss medications and any further needs.        Return in about 4 weeks (around 10/30/2023) for follow up meds.    Please note that this dictation was created using voice recognition software. I have made every reasonable attempt to correct obvious errors, but I expect that there are errors of grammar and possibly content that I did not discover before finalizing the note.

## 2023-11-07 ENCOUNTER — OFFICE VISIT (OUTPATIENT)
Dept: MEDICAL GROUP | Facility: LAB | Age: 28
End: 2023-11-07
Payer: COMMERCIAL

## 2023-11-07 VITALS
WEIGHT: 293 LBS | HEIGHT: 69 IN | RESPIRATION RATE: 12 BRPM | HEART RATE: 87 BPM | TEMPERATURE: 97 F | SYSTOLIC BLOOD PRESSURE: 104 MMHG | DIASTOLIC BLOOD PRESSURE: 70 MMHG | OXYGEN SATURATION: 94 % | BODY MASS INDEX: 43.4 KG/M2

## 2023-11-07 DIAGNOSIS — E05.90 HYPERTHYROIDISM: ICD-10-CM

## 2023-11-07 DIAGNOSIS — F51.01 PRIMARY INSOMNIA: ICD-10-CM

## 2023-11-07 PROCEDURE — 3074F SYST BP LT 130 MM HG: CPT | Performed by: FAMILY MEDICINE

## 2023-11-07 PROCEDURE — 99214 OFFICE O/P EST MOD 30 MIN: CPT | Performed by: FAMILY MEDICINE

## 2023-11-07 PROCEDURE — 3078F DIAST BP <80 MM HG: CPT | Performed by: FAMILY MEDICINE

## 2023-11-07 RX ORDER — KETOCONAZOLE 20 MG/ML
SHAMPOO TOPICAL
COMMUNITY
Start: 2023-10-30

## 2023-11-07 RX ORDER — LORAZEPAM 0.5 MG/1
0.5 TABLET ORAL
Qty: 30 TABLET | Refills: 0 | Status: SHIPPED | OUTPATIENT
Start: 2023-11-07 | End: 2023-12-07

## 2023-11-07 ASSESSMENT — FIBROSIS 4 INDEX: FIB4 SCORE: 0.41

## 2023-11-07 NOTE — PROGRESS NOTES
"Subjective:     Chief Complaint   Patient presents with    Follow-Up     meds         HPI:   Damari presents today with follow up of mental health meds, weight loss, etc.   Still struggling with getting to sleep.   Hard to get to sleep. 6-7 hours usually. But not falling asleep in an hour of laying in bed.   Overall from a mental health standpoint she does feel much better than she had previously.  Her mother has noticed an improvement as well.  She has lost a little bit of weight and has been working on staying as active as possible.        Current Outpatient Medications Ordered in Epic   Medication Sig Dispense Refill    methimazole (TAPAZOLE) 10 MG Tab Take 1 Tablet by mouth 2 times a day. 180 Tablet 6    FLUoxetine (PROZAC) 20 MG Cap Take 1 Capsule by mouth every day. 184 Capsule 3    triamcinolone acetonide (KENALOG) 0.1 % Ointment Pea sized amount to affected area twice daily. 80 g 1     No current [facility]-ordered facility-administered medications on file.         ROS:  Gen: no fevers/chills, no changes in weight  Eyes: no changes in vision  ENT: no sore throat, no hearing loss, no bloody nose  Pulm: no sob, no cough  CV: no chest pain, no palpitations  GI: no nausea/vomiting, no diarrhea  : no dysuria  MSk: no myalgias  Skin: no rash  Neuro: no headaches, no numbness/tingling  Heme/Lymph: no easy bruising      Objective:     Exam:  /70 (BP Location: Left arm, Patient Position: Sitting, BP Cuff Size: Adult long)   Pulse 87   Temp 36.1 °C (97 °F)   Resp 12   Ht 1.753 m (5' 9\")   Wt (!) 139 kg (307 lb)   SpO2 94%   BMI 45.34 kg/m²  Body mass index is 45.34 kg/m².    Gen: AAOx3, NAD, well appearing  HEENT: NCAT, EOMI, Nares patent, Mucosa moist  Resp: Normal chest wall rise and fall, not SOB, no tachypnea  Skin: no rash or abnormality of visible skin.   Psych: normal speech, not slurred, good insight, affect full  MSK: Moves all four limbs equally and normally, gait normal        Assessment & Plan: "     27 y.o. female with the following -     1. Primary insomnia  Discussed continued insomnia.  She will continue her fluoxetine as is but then try little lorazepam at nighttime as needed.  If she can fall asleep a little bit easier she may get more restful sleep throughout the night as well.  She will let me know how things are going in the next few weeks.  - LORazepam (ATIVAN) 0.5 MG Tab; Take 1 Tablet by mouth at bedtime for 30 days.  Dispense: 30 Tablet; Refill: 0    2. Hyperthyroidism  Last thyroid check actually did not look that bad.  She will continue her methimazole as is.  She will get her thyroid rechecked in the next 3 months and then follow-up in clinic.  She will deftly let us know if there is any concerns or issues before then.  - TSH; Future  - FREE THYROXINE; Future  - TRIIDOTHYRONINE; Future          No follow-ups on file.    Please note that this dictation was created using voice recognition software. I have made every reasonable attempt to correct obvious errors, but I expect that there are errors of grammar and possibly content that I did not discover before finalizing the note.

## 2024-02-06 ENCOUNTER — OFFICE VISIT (OUTPATIENT)
Dept: MEDICAL GROUP | Facility: LAB | Age: 29
End: 2024-02-06
Payer: COMMERCIAL

## 2024-02-06 VITALS
BODY MASS INDEX: 43.4 KG/M2 | OXYGEN SATURATION: 97 % | HEIGHT: 69 IN | SYSTOLIC BLOOD PRESSURE: 112 MMHG | RESPIRATION RATE: 18 BRPM | WEIGHT: 293 LBS | DIASTOLIC BLOOD PRESSURE: 70 MMHG | TEMPERATURE: 97.6 F | HEART RATE: 95 BPM

## 2024-02-06 DIAGNOSIS — E05.90 HYPERTHYROIDISM: ICD-10-CM

## 2024-02-06 DIAGNOSIS — F32.A ANXIETY AND DEPRESSION: ICD-10-CM

## 2024-02-06 DIAGNOSIS — F41.9 ANXIETY AND DEPRESSION: ICD-10-CM

## 2024-02-06 PROCEDURE — 3074F SYST BP LT 130 MM HG: CPT | Performed by: FAMILY MEDICINE

## 2024-02-06 PROCEDURE — 99214 OFFICE O/P EST MOD 30 MIN: CPT | Performed by: FAMILY MEDICINE

## 2024-02-06 PROCEDURE — 3078F DIAST BP <80 MM HG: CPT | Performed by: FAMILY MEDICINE

## 2024-02-06 RX ORDER — FLUOXETINE HYDROCHLORIDE 40 MG/1
40 CAPSULE ORAL DAILY
Qty: 90 CAPSULE | Refills: 2 | Status: SHIPPED | OUTPATIENT
Start: 2024-02-06 | End: 2024-05-06

## 2024-02-06 RX ORDER — METHIMAZOLE 10 MG/1
10 TABLET ORAL
Qty: 180 TABLET | Refills: 3 | Status: SHIPPED | OUTPATIENT
Start: 2024-02-06

## 2024-02-06 ASSESSMENT — PATIENT HEALTH QUESTIONNAIRE - PHQ9: CLINICAL INTERPRETATION OF PHQ2 SCORE: 0

## 2024-02-06 ASSESSMENT — FIBROSIS 4 INDEX: FIB4 SCORE: 0.42

## 2024-02-06 NOTE — PROGRESS NOTES
"Subjective:     Chief Complaint   Patient presents with    Follow-Up         HPI:   Damari presents today with follow up mood, insomnia, and hyperthyroidism.   Feeling ok.   Not taking meds regularly. Hasn't seen endocrine in some time.     Gaining weight again.     Didn't get labs done.     Going to bed later, but sleeping solid.     Current Outpatient Medications Ordered in Epic   Medication Sig Dispense Refill    ketoconazole (NIZORAL) 2 % shampoo       methimazole (TAPAZOLE) 10 MG Tab Take 1 Tablet by mouth 2 times a day. 180 Tablet 6    FLUoxetine (PROZAC) 20 MG Cap Take 1 Capsule by mouth every day. 184 Capsule 3    triamcinolone acetonide (KENALOG) 0.1 % Ointment Pea sized amount to affected area twice daily. 80 g 1     No current [facility]-ordered facility-administered medications on file.         ROS:  Gen: no fevers/chills, no changes in weight  Eyes: no changes in vision  ENT: no sore throat, no hearing loss, no bloody nose  Pulm: no sob, no cough  CV: no chest pain, no palpitations  GI: no nausea/vomiting, no diarrhea  : no dysuria  MSk: no myalgias  Skin: no rash  Neuro: no headaches, no numbness/tingling  Heme/Lymph: no easy bruising      Objective:     Exam:  /70 (BP Location: Left arm, Patient Position: Sitting, BP Cuff Size: Large adult)   Pulse 95   Temp 36.4 °C (97.6 °F)   Resp 18   Ht 1.753 m (5' 9\")   Wt (!) 143 kg (315 lb)   SpO2 97%   BMI 46.52 kg/m²  Body mass index is 46.52 kg/m².    Gen: AAOx3, NAD, well appearing  HEENT: NCAT, EOMI, Nares patent, Mucosa moist  Resp: Normal chest wall rise and fall, not SOB, no tachypnea  Skin: no rash or abnormality of visible skin.   Psych: normal speech, not slurred, good insight, affect full  MSK: Moves all four limbs equally and normally, gait normal    Assessment & Plan:     28 y.o. female with the following -     1. Hyperthyroidism  She has been missing her last dose of the methimazole at nighttime but otherwise taking it regularly.  Has " not gotten her thyroid labs done.  Will try to get these done today or in the near future.  This will help us know if we need to adjust her medication or have her see endocrinology again.  - methimazole (TAPAZOLE) 10 MG Tab; Take 1 Tablet by mouth 2 times a day.  Dispense: 180 Tablet; Refill: 3    2. Anxiety and depression  This is actually doing a lot better than it was before.  We did discuss continuing to work on getting sleep and regular activity.  Refills are sent in.  - FLUoxetine (PROZAC) 40 MG capsule; Take 1 Capsule by mouth every day for 90 days.  Dispense: 90 Capsule; Refill: 2            No follow-ups on file.    Please note that this dictation was created using voice recognition software. I have made every reasonable attempt to correct obvious errors, but I expect that there are errors of grammar and possibly content that I did not discover before finalizing the note.

## 2024-07-09 ENCOUNTER — HOSPITAL ENCOUNTER (OUTPATIENT)
Dept: LAB | Facility: MEDICAL CENTER | Age: 29
End: 2024-07-09
Attending: FAMILY MEDICINE
Payer: COMMERCIAL

## 2024-07-09 ENCOUNTER — HOSPITAL ENCOUNTER (OUTPATIENT)
Dept: RADIOLOGY | Facility: MEDICAL CENTER | Age: 29
End: 2024-07-09
Attending: FAMILY MEDICINE
Payer: COMMERCIAL

## 2024-07-09 ENCOUNTER — OFFICE VISIT (OUTPATIENT)
Dept: MEDICAL GROUP | Facility: LAB | Age: 29
End: 2024-07-09
Payer: COMMERCIAL

## 2024-07-09 VITALS
RESPIRATION RATE: 14 BRPM | OXYGEN SATURATION: 96 % | TEMPERATURE: 97.6 F | HEIGHT: 69 IN | SYSTOLIC BLOOD PRESSURE: 114 MMHG | DIASTOLIC BLOOD PRESSURE: 68 MMHG | WEIGHT: 293 LBS | HEART RATE: 104 BPM | BODY MASS INDEX: 43.4 KG/M2

## 2024-07-09 DIAGNOSIS — E05.90 HYPERTHYROIDISM: ICD-10-CM

## 2024-07-09 DIAGNOSIS — S99.911A INJURY OF RIGHT ANKLE, INITIAL ENCOUNTER: ICD-10-CM

## 2024-07-09 DIAGNOSIS — L03.116 CELLULITIS OF LEFT LOWER EXTREMITY: ICD-10-CM

## 2024-07-09 LAB
T3 SERPL-MCNC: 188 NG/DL (ref 60–181)
T4 FREE SERPL-MCNC: 1.88 NG/DL (ref 0.93–1.7)
TSH SERPL DL<=0.005 MIU/L-ACNC: <0.005 UIU/ML (ref 0.38–5.33)

## 2024-07-09 PROCEDURE — 84443 ASSAY THYROID STIM HORMONE: CPT

## 2024-07-09 PROCEDURE — 84480 ASSAY TRIIODOTHYRONINE (T3): CPT

## 2024-07-09 PROCEDURE — 36415 COLL VENOUS BLD VENIPUNCTURE: CPT

## 2024-07-09 PROCEDURE — 3078F DIAST BP <80 MM HG: CPT | Performed by: FAMILY MEDICINE

## 2024-07-09 PROCEDURE — 84439 ASSAY OF FREE THYROXINE: CPT

## 2024-07-09 PROCEDURE — 3074F SYST BP LT 130 MM HG: CPT | Performed by: FAMILY MEDICINE

## 2024-07-09 PROCEDURE — 73610 X-RAY EXAM OF ANKLE: CPT | Mod: LT

## 2024-07-09 PROCEDURE — 99214 OFFICE O/P EST MOD 30 MIN: CPT | Performed by: FAMILY MEDICINE

## 2024-07-09 RX ORDER — SULFAMETHOXAZOLE AND TRIMETHOPRIM 800; 160 MG/1; MG/1
1 TABLET ORAL 2 TIMES DAILY
Qty: 20 TABLET | Refills: 0 | Status: SHIPPED | OUTPATIENT
Start: 2024-07-09 | End: 2024-07-19

## 2024-07-09 ASSESSMENT — FIBROSIS 4 INDEX: FIB4 SCORE: 0.42

## 2024-07-11 ENCOUNTER — PATIENT MESSAGE (OUTPATIENT)
Dept: MEDICAL GROUP | Facility: LAB | Age: 29
End: 2024-07-11
Payer: COMMERCIAL

## 2024-07-11 RX ORDER — METHIMAZOLE 10 MG/1
10 TABLET ORAL
Qty: 180 TABLET | Refills: 3 | Status: SHIPPED | OUTPATIENT
Start: 2024-07-11

## 2024-07-11 RX ORDER — SEMAGLUTIDE 0.25 MG/.5ML
0.25 INJECTION, SOLUTION SUBCUTANEOUS
Qty: 2 ML | Refills: 0 | Status: SHIPPED | OUTPATIENT
Start: 2024-07-11

## 2024-09-04 ENCOUNTER — APPOINTMENT (OUTPATIENT)
Dept: RADIOLOGY | Facility: MEDICAL CENTER | Age: 29
End: 2024-09-04
Attending: EMERGENCY MEDICINE
Payer: COMMERCIAL

## 2024-09-04 ENCOUNTER — HOSPITAL ENCOUNTER (EMERGENCY)
Facility: MEDICAL CENTER | Age: 29
End: 2024-09-04
Attending: EMERGENCY MEDICINE
Payer: COMMERCIAL

## 2024-09-04 VITALS
OXYGEN SATURATION: 99 % | WEIGHT: 293 LBS | HEIGHT: 69 IN | DIASTOLIC BLOOD PRESSURE: 56 MMHG | TEMPERATURE: 98 F | SYSTOLIC BLOOD PRESSURE: 109 MMHG | RESPIRATION RATE: 18 BRPM | HEART RATE: 59 BPM | BODY MASS INDEX: 43.4 KG/M2

## 2024-09-04 DIAGNOSIS — R10.13 EPIGASTRIC PAIN: ICD-10-CM

## 2024-09-04 DIAGNOSIS — K29.00 OTHER ACUTE GASTRITIS WITHOUT HEMORRHAGE: ICD-10-CM

## 2024-09-04 LAB
ALBUMIN SERPL BCP-MCNC: 4 G/DL (ref 3.2–4.9)
ALBUMIN/GLOB SERPL: 1.4 G/DL
ALP SERPL-CCNC: 80 U/L (ref 30–99)
ALT SERPL-CCNC: 73 U/L (ref 2–50)
ANION GAP SERPL CALC-SCNC: 10 MMOL/L (ref 7–16)
AST SERPL-CCNC: 91 U/L (ref 12–45)
BASOPHILS # BLD AUTO: 0.5 % (ref 0–1.8)
BASOPHILS # BLD: 0.04 K/UL (ref 0–0.12)
BILIRUB SERPL-MCNC: 0.6 MG/DL (ref 0.1–1.5)
BUN SERPL-MCNC: 11 MG/DL (ref 8–22)
CALCIUM ALBUM COR SERPL-MCNC: 8.8 MG/DL (ref 8.5–10.5)
CALCIUM SERPL-MCNC: 8.8 MG/DL (ref 8.4–10.2)
CHLORIDE SERPL-SCNC: 105 MMOL/L (ref 96–112)
CO2 SERPL-SCNC: 23 MMOL/L (ref 20–33)
CREAT SERPL-MCNC: 0.65 MG/DL (ref 0.5–1.4)
EKG IMPRESSION: NORMAL
EOSINOPHIL # BLD AUTO: 0.61 K/UL (ref 0–0.51)
EOSINOPHIL NFR BLD: 7.1 % (ref 0–6.9)
ERYTHROCYTE [DISTWIDTH] IN BLOOD BY AUTOMATED COUNT: 39.8 FL (ref 35.9–50)
GFR SERPLBLD CREATININE-BSD FMLA CKD-EPI: 121 ML/MIN/1.73 M 2
GLOBULIN SER CALC-MCNC: 2.9 G/DL (ref 1.9–3.5)
GLUCOSE SERPL-MCNC: 101 MG/DL (ref 65–99)
HCT VFR BLD AUTO: 44.8 % (ref 37–47)
HGB BLD-MCNC: 14.5 G/DL (ref 12–16)
IMM GRANULOCYTES # BLD AUTO: 0.03 K/UL (ref 0–0.11)
IMM GRANULOCYTES NFR BLD AUTO: 0.4 % (ref 0–0.9)
LIPASE SERPL-CCNC: 35 U/L (ref 11–82)
LYMPHOCYTES # BLD AUTO: 2.42 K/UL (ref 1–4.8)
LYMPHOCYTES NFR BLD: 28.2 % (ref 22–41)
MCH RBC QN AUTO: 26.9 PG (ref 27–33)
MCHC RBC AUTO-ENTMCNC: 32.4 G/DL (ref 32.2–35.5)
MCV RBC AUTO: 83.1 FL (ref 81.4–97.8)
MONOCYTES # BLD AUTO: 0.57 K/UL (ref 0–0.85)
MONOCYTES NFR BLD AUTO: 6.7 % (ref 0–13.4)
NEUTROPHILS # BLD AUTO: 4.9 K/UL (ref 1.82–7.42)
NEUTROPHILS NFR BLD: 57.1 % (ref 44–72)
NRBC # BLD AUTO: 0 K/UL
NRBC BLD-RTO: 0 /100 WBC (ref 0–0.2)
PLATELET # BLD AUTO: 235 K/UL (ref 164–446)
PMV BLD AUTO: 10.4 FL (ref 9–12.9)
POTASSIUM SERPL-SCNC: 3.9 MMOL/L (ref 3.6–5.5)
PROT SERPL-MCNC: 6.9 G/DL (ref 6–8.2)
RBC # BLD AUTO: 5.39 M/UL (ref 4.2–5.4)
SODIUM SERPL-SCNC: 138 MMOL/L (ref 135–145)
TROPONIN T SERPL-MCNC: <6 NG/L (ref 6–19)
WBC # BLD AUTO: 8.6 K/UL (ref 4.8–10.8)

## 2024-09-04 PROCEDURE — 76705 ECHO EXAM OF ABDOMEN: CPT

## 2024-09-04 PROCEDURE — 80053 COMPREHEN METABOLIC PANEL: CPT

## 2024-09-04 PROCEDURE — 36415 COLL VENOUS BLD VENIPUNCTURE: CPT

## 2024-09-04 PROCEDURE — 85025 COMPLETE CBC W/AUTO DIFF WBC: CPT

## 2024-09-04 PROCEDURE — 84484 ASSAY OF TROPONIN QUANT: CPT

## 2024-09-04 PROCEDURE — 71045 X-RAY EXAM CHEST 1 VIEW: CPT

## 2024-09-04 PROCEDURE — 96375 TX/PRO/DX INJ NEW DRUG ADDON: CPT

## 2024-09-04 PROCEDURE — 93005 ELECTROCARDIOGRAM TRACING: CPT

## 2024-09-04 PROCEDURE — 96374 THER/PROPH/DIAG INJ IV PUSH: CPT

## 2024-09-04 PROCEDURE — 700111 HCHG RX REV CODE 636 W/ 250 OVERRIDE (IP): Mod: JZ | Performed by: EMERGENCY MEDICINE

## 2024-09-04 PROCEDURE — 93005 ELECTROCARDIOGRAM TRACING: CPT | Performed by: EMERGENCY MEDICINE

## 2024-09-04 PROCEDURE — A9270 NON-COVERED ITEM OR SERVICE: HCPCS | Performed by: EMERGENCY MEDICINE

## 2024-09-04 PROCEDURE — 83690 ASSAY OF LIPASE: CPT

## 2024-09-04 PROCEDURE — 700102 HCHG RX REV CODE 250 W/ 637 OVERRIDE(OP): Performed by: EMERGENCY MEDICINE

## 2024-09-04 PROCEDURE — 99284 EMERGENCY DEPT VISIT MOD MDM: CPT

## 2024-09-04 RX ORDER — IBUPROFEN 200 MG
600 TABLET ORAL EVERY 6 HOURS PRN
COMMUNITY

## 2024-09-04 RX ORDER — ONDANSETRON 4 MG/1
4 TABLET, ORALLY DISINTEGRATING ORAL EVERY 6 HOURS PRN
Qty: 15 TABLET | Refills: 0 | Status: SHIPPED | OUTPATIENT
Start: 2024-09-04

## 2024-09-04 RX ORDER — ONDANSETRON 2 MG/ML
4 INJECTION INTRAMUSCULAR; INTRAVENOUS ONCE
Status: COMPLETED | OUTPATIENT
Start: 2024-09-04 | End: 2024-09-04

## 2024-09-04 RX ORDER — MORPHINE SULFATE 4 MG/ML
4 INJECTION INTRAVENOUS ONCE
Status: COMPLETED | OUTPATIENT
Start: 2024-09-04 | End: 2024-09-04

## 2024-09-04 RX ORDER — LEVONORGESTREL 52 MG/1
1 INTRAUTERINE DEVICE INTRAUTERINE SEE ADMIN INSTRUCTIONS
COMMUNITY

## 2024-09-04 RX ADMIN — LIDOCAINE HYDROCHLORIDE 30 ML: 20 SOLUTION ORAL; TOPICAL at 09:10

## 2024-09-04 RX ADMIN — MORPHINE SULFATE 4 MG: 4 INJECTION, SOLUTION INTRAMUSCULAR; INTRAVENOUS at 08:48

## 2024-09-04 RX ADMIN — ONDANSETRON 4 MG: 2 INJECTION INTRAMUSCULAR; INTRAVENOUS at 08:48

## 2024-09-04 ASSESSMENT — PAIN DESCRIPTION - PAIN TYPE: TYPE: ACUTE PAIN

## 2024-09-04 ASSESSMENT — FIBROSIS 4 INDEX: FIB4 SCORE: 0.42

## 2024-09-04 NOTE — DISCHARGE INSTRUCTIONS
Please talk to your primary care doctor about obtaining an outpatient HIDA scan.  Information about this imaging studies included in your discharge paper.  Ultrasound today shows a normal-appearing gallbladder although you have stones.  There is no signs that it is acutely inflamed or obstructed.  Additionally, I would consider GI follow-up.  This is something that your primary care doctor can also assist you with obtaining.  Dr. Wilson [facility] is on-call today and his information is included in your discharge summary.  Prilosec should be taken on a daily basis.

## 2024-09-04 NOTE — ED PROVIDER NOTES
"ED Provider Note    CHIEF COMPLAINT  Chief Complaint   Patient presents with    Chest Pain     Midsternal chest pain that started 3 days ago and pt states is worsening. Pt endorses some pain into central back. Denies arm or jaw pain. Antacids not helping.        EXTERNAL RECORDS REVIEWED  Patient's last encounter was in July of this year she was seen in in her PCPs office for left foot pain.  She was diagnosed with cellulitis and placed on Bactrim.  Prior to that she was again seen in her PCPs office for follow-up on her hyperthyroidism.  Additionally, she was at the time, noted to be on Prozac for anxiety and depression.    ED visit in September of last year for pleuritic chest pain, urticaria and an infected wound.  She was again prescribed Bactrim.  ED visit in March 2022 for dental pain, viral syndrome and systemic inflammatory response syndrome.  ED visit in May 2019 for pelvic pain and cyst of the right ovary.    HPI/ROS  LIMITATION TO HISTORY   Select: : None  OUTSIDE HISTORIAN(S):  Parent mother    Damari Hansen is a 28 y.o. female who presents to the emergency department accompanied by her mother with a chief complaint of epigastric pain, chest pain.  She has had the symptoms for 3 days.  She has had the symptoms on and off for the past year.  She states she has been in the emergency department multiple times in the past but never been given a definitive diagnosis.  She states she has told her PCP about them but her symptoms were \"ignored\".  She has been taking Prilosec for the last 3 days but is not on a regular PPI.  She has never pursued GI evaluation or endoscopy.  She previously was on methimazole for her thyroid but has not taken it for 3 months because her primary care doctor has not instructed her on how to use it so she has not.  She denies a fever.  No cough.  No urinary complaints.  No change in bowel habits.  She has nausea but no vomiting.  She does not report certain foods that are " irritating.  She does report laying flat, worsens her symptoms.  She has had her tonsils removed but no other abdominal surgeries.  She denies alcohol use.  She occasionally takes NSAIDs but not regularly.  She is allergic to amoxicillin.  Her mother states that she has a history of anxiety and is supposed to be on medication for this but is not taking it it is unclear why.  She has    PAST MEDICAL HISTORY   has a past medical history of Allergy, Asthma, Hyperthyroidism, Migraine, and Patient currently pregnant (6/10/2020).    SURGICAL HISTORY   has a past surgical history that includes tonsillectomy.    FAMILY HISTORY  Family History   Problem Relation Age of Onset    Cancer Father         melanoma    Cancer Paternal Grandfather         lymphoma    Dementia Mother     Cancer Maternal Aunt         breast cancer    Dementia Maternal Grandmother     Dementia Maternal Grandfather     No Known Problems Paternal Grandmother     Cancer Maternal Aunt         lymphmoa       SOCIAL HISTORY  Social History     Tobacco Use    Smoking status: Never    Smokeless tobacco: Never   Vaping Use    Vaping status: Never Used   Substance and Sexual Activity    Alcohol use: Yes     Comment: rare    Drug use: No    Sexual activity: Yes     Partners: Male     Birth control/protection: I.U.D.       CURRENT MEDICATIONS  Home Medications       Reviewed by Brigitte Fitzpatrick (Pharmacy Tech) on 09/04/24 at 1011  Med List Status: Complete     Medication Last Dose Status   ibuprofen (MOTRIN) 200 MG Tab > 3 days Active   levonorgestrel (MIRENA, 52 MG,) 20 MCG/DAY IUD > 6 years Active   methimazole (TAPAZOLE) 10 MG Tab Not Taking Active   omeprazole (PRILOSEC) 20 MG delayed-release capsule 9/4/2024 Active   Semaglutide (WEGOVY) 0.25 MG/0.5ML Solution Auto-injector Pen-injector Not Taking Active                    ALLERGIES  Allergies   Allergen Reactions    Amoxicillin Hives and Rash     Rxn - 2016    Hydrocodone-Acetaminophen      Other  "reaction(s): hyperactiveness    Propylthiouracil      Other reaction(s): difficulty tolerating       PHYSICAL EXAM  VITAL SIGNS: /56   Pulse (!) 59   Temp 36.7 °C (98 °F)   Resp 18   Ht 1.753 m (5' 9\")   Wt (!) 146 kg (320 lb 12.3 oz)   SpO2 99%   Breastfeeding No   BMI 47.37 kg/m²    Vitals reviewed.  Constitutional: Patient is oriented to person, place, and time. Appears well-developed and well-nourished. Mild-Moderate distress.    Head: Normocephalic and atraumatic.   Ears: Normal external ears bilaterally.   Mouth/Throat: Oropharynx is clear and moist, no exudates.   Eyes: Conjunctivae are normal. Pupils are equal, round, and reactive to light.   Neck: Normal range of motion. Neck supple. No tracheal deviation present.   Cardiovascular: Normal rate, regular rhythm and normal heart sounds. Normal peripheral pulses.  Pulmonary/Chest: Effort normal and breath sounds normal. No respiratory distress, no wheezes, rhonchi, or rales. No chest wall tenderness.  Abdominal: Soft. Bowel sounds are normal. There is no tenderness, rebound or guarding, or peritoneal signs, no masses. No CVA tenderness.  Musculoskeletal: No edema and no tenderness.   Lymphadenopathy: No cervical adenopathy.   Neurological: Patient is alert and oriented to person, place, and time. No cranial nerve deficits. Normal motor and sensory exam. No focal deficits.   Skin: Skin is warm and dry. No erythema. No pallor.   Psychiatric: Patient has a normal mood and affect.     EKG/LABS  Results for orders placed or performed during the hospital encounter of 09/04/24   TROPONIN   Result Value Ref Range    Troponin T <6 6 - 19 ng/L   CBC WITH DIFFERENTIAL   Result Value Ref Range    WBC 8.6 4.8 - 10.8 K/uL    RBC 5.39 4.20 - 5.40 M/uL    Hemoglobin 14.5 12.0 - 16.0 g/dL    Hematocrit 44.8 37.0 - 47.0 %    MCV 83.1 81.4 - 97.8 fL    MCH 26.9 (L) 27.0 - 33.0 pg    MCHC 32.4 32.2 - 35.5 g/dL    RDW 39.8 35.9 - 50.0 fL    Platelet Count 235 164 - " 446 K/uL    MPV 10.4 9.0 - 12.9 fL    Neutrophils-Polys 57.10 44.00 - 72.00 %    Lymphocytes 28.20 22.00 - 41.00 %    Monocytes 6.70 0.00 - 13.40 %    Eosinophils 7.10 (H) 0.00 - 6.90 %    Basophils 0.50 0.00 - 1.80 %    Immature Granulocytes 0.40 0.00 - 0.90 %    Nucleated RBC 0.00 0.00 - 0.20 /100 WBC    Neutrophils (Absolute) 4.90 1.82 - 7.42 K/uL    Lymphs (Absolute) 2.42 1.00 - 4.80 K/uL    Monos (Absolute) 0.57 0.00 - 0.85 K/uL    Eos (Absolute) 0.61 (H) 0.00 - 0.51 K/uL    Baso (Absolute) 0.04 0.00 - 0.12 K/uL    Immature Granulocytes (abs) 0.03 0.00 - 0.11 K/uL    NRBC (Absolute) 0.00 K/uL   LIPASE   Result Value Ref Range    Lipase 35 11 - 82 U/L   Comp Metabolic Panel   Result Value Ref Range    Sodium 138 135 - 145 mmol/L    Potassium 3.9 3.6 - 5.5 mmol/L    Chloride 105 96 - 112 mmol/L    Co2 23 20 - 33 mmol/L    Anion Gap 10.0 7.0 - 16.0    Glucose 101 (H) 65 - 99 mg/dL    Bun 11 8 - 22 mg/dL    Creatinine 0.65 0.50 - 1.40 mg/dL    Calcium 8.8 8.4 - 10.2 mg/dL    Correct Calcium 8.8 8.5 - 10.5 mg/dL    AST(SGOT) 91 (H) 12 - 45 U/L    ALT(SGPT) 73 (H) 2 - 50 U/L    Alkaline Phosphatase 80 30 - 99 U/L    Total Bilirubin 0.6 0.1 - 1.5 mg/dL    Albumin 4.0 3.2 - 4.9 g/dL    Total Protein 6.9 6.0 - 8.2 g/dL    Globulin 2.9 1.9 - 3.5 g/dL    A-G Ratio 1.4 g/dL   ESTIMATED GFR   Result Value Ref Range    GFR (CKD-EPI) 121 >60 mL/min/1.73 m 2   EKG   Result Value Ref Range    Report       [facility] Emergency Dept.    Test Date:  2024  Pt Name:    SOFIA HIGGINBOTHAM              Department: EDSM  MRN:        [id number]                      Room:  Gender:     Female                       Technician: [id number]  :        1995                   Requested By:ER TRIAGE PROTOCOL  Order #:    [id number]                    Mendez MD: CATRINA AMADO, DO    Measurements  Intervals                                Axis  Rate:       87                           P:          47  NE:         140                           QRS:        76  QRSD:       95                           T:          54  QT:         397  QTc:        478    Interpretive Statements  Sinus rhythm  Borderline prolonged QT interval  Compared to ECG 09/25/2023 03:54:56  No significant changes  Electronically Signed On 09- 08:03:46 PDT by CATIRNA AMADO, DO       I have independently interpreted this EKG    RADIOLOGY/PROCEDURES   I have independently interpreted the diagnostic imaging associated with this visit and am waiting the final reading from the radiologist.   My preliminary interpretation is as follows: NAPD    Radiologist interpretation:  US-RUQ   Final Result      1.  Gallstones within the gallbladder. No evidence of biliary ductal dilatation.      2.  The liver is echogenic consistent with fatty change versus hepatocellular dysfunction.      DX-CHEST-PORTABLE (1 VIEW)   Final Result      No evidence of acute cardiopulmonary process.          COURSE & MEDICAL DECISION MAKING    ASSESSMENT, COURSE AND PLAN  Care Narrative:     This is a 28-year-old female.  She presents with her mother with a chief complaint of epigastric pain, chest pain.  Symptoms that she has been having on and off for the past year.  She is never been given any answers.  She has not had this addressed by her PCP unfortunately.  She has not pursued GI evaluation.  She has not on a PPI surprisingly.  Other than her weight, she does not have significant cardiac risk factors and clinically, her symptoms do not seem cardiac in etiology.  Patient reports that she has been seen in the emergency department 3 times over the last year for similar symptoms but I do not see these visits.  She has been here for other reasons.  An IV has been established.  Labs drawn per nursing protocols.  EKG was reassuring.  Will plan for administration of pain medication, antiemetics and a GI cocktail.  Differential includes but is not limited to: Gastritis, dyspepsia, GERD, peptic ulcer  disease, pancreatitis, stress response, doubt ACS.    12:14 PM patient's reevaluated the bedside.  We had a long discussion about her symptoms.  She is advised on taking a PPI on a regular basis.  She is given a prescription for this.  We also discussed ultrasound findings.  No evidence of acute cholecystitis.  Certainly, she may have a gallbladder that does not function well and we discussed outpatient follow-up with HIDA scan.  I have encouraged her to follow-up with her PCP to obtain this.  I have also encouraged her to follow-up with GI to pursue endoscopy, should her symptoms persist.  She is advised on foods to avoid.  I doubt this is cardiac in its etiology.  She has a low heart score, less than 3.  She is feeling better after IV fluid resuscitation and medications.  I feel she can safely be discharged home.  Both she and mother are given an opportunity for questions.  She is discharged home in stable condition.    ADDITIONAL PROBLEMS MANAGED    DISPOSITION AND DISCUSSIONS  I have discussed management of the patient with the following physicians and MEENA's:  None    Discussion of management with other QHP or appropriate source(s): None     Escalation of care considered, and ultimately not performed:acute inpatient care management, however at this time, the patient is most appropriate for outpatient management    Barriers to care at this time, including but not limited to: None.     Decision tools and prescription drugs considered including, but not limited to: None.    FINAL DIAGNOSIS  1. Other acute gastritis without hemorrhage    2. Epigastric pain         Electronically signed by: Collette Richardson D.O., 9/4/2024 8:03 AM

## 2024-09-04 NOTE — ED NOTES
Discharge instructions given and discussed. RX education given and patient educated to come back to ER for new or worsening symptoms. Instructed to follow up with GI MD. Patient verbalized understanding. GCS of 15. Pt walked out with steady gait.

## 2024-09-04 NOTE — ED TRIAGE NOTES
"BIB mother for following complaints.     Chief Complaint   Patient presents with    Chest Pain     Midsternal chest pain that started 3 days ago and pt states is worsening. Pt endorses some pain into central back. Denies arm or jaw pain. Antacids not helping.      /81   Pulse 94   Temp 36.7 °C (98 °F) (Temporal)   Resp 18   Ht 1.753 m (5' 9\")   Wt (!) 146 kg (320 lb 12.3 oz)   SpO2 95%   Breastfeeding No   BMI 47.37 kg/m²     "

## 2024-09-04 NOTE — ED NOTES
Medication history reviewed with pt. Med rec is complete.  Allergies reviewed, per pt  Interviewed pt with mother at bedside with permission from pt.    Pt reports that she has not taken his METHIMAZOLE 10MG or WEGOVY in the last 30 days or longer.    Pt reports no prescription medications in the last 30 days or longer.  Pt reports no antibiotics in the last 30 days.    Patient has not had any outpatient antibiotics in the last 30 days.    Pt is not on any anticoagulants

## 2024-10-09 ENCOUNTER — PATIENT MESSAGE (OUTPATIENT)
Dept: MEDICAL GROUP | Facility: LAB | Age: 29
End: 2024-10-09
Payer: COMMERCIAL

## 2024-10-09 DIAGNOSIS — R10.11 RIGHT UPPER QUADRANT PAIN: ICD-10-CM

## 2025-02-27 NOTE — PROGRESS NOTES
Subjective:     Chief Complaint   Patient presents with   • Follow-Up     Prozac   • Advice Only     sorethroat and cough since yesterday     Damari Hansen is a 25 y.o. female here today to follow up on chronic conditions as noted below.  Additionally, she woke up this morning with a sore throat and a cough.  Has pain with swallowing, slightly short of breath.  She states that strep has been going around her child's  and is concerned about this.  She is not using any medications.  No fever, chills, nausea.  She does have history of asthma, has not used her albuterol.  She is fully vaccinated for COVID-19    Hyperthyroidism  This is been a chronic issue dating back to about age 14, untreated the last several years.  We did obtain thyroid uptake scan as noted below.  She just restarted methimazole today.  We will plan for recheck of her labs.  Component                                    Latest Ref Rng & Units                   5/7/2021 5/7/2021 5/7/2021                                                                                                      7:54 AM               7:54 AM              7:54 AM               TSH                                          0.380 - 5.330 uIU/mL                     <0.005 (L)                                                        Free T-4                                     0.93 - 1.70 ng/dL                                              2.37 (H)                                    T3                                           60.0 - 181.0 ng/dL                                                                  234.0 (H)                6/2/2021 10:06 AM     HISTORY/REASON FOR EXAM:  Hyperthyroidism, no nodule.        TECHNIQUE/EXAM DESCRIPTION AND NUMBER OF VIEWS:  Thyroid uptake and scan, nuclear medicine.     COMPARISON: None     PROCEDURE:     0.472 microcuries I-123 were administered as two capsules.  The patient returned five hours later for  uptake measurements and scanning.     FINDINGS:  The planar scan demonstrates homogeneous labeling throughout the thyroid gland.     The five-hour uptake measurement equals 70%.     IMPRESSION:     Increased uptake is consistent with hyperthyroidism      Anxiety and depression  For detailed history please see notes dated 4/15/2021 and 5/13/2021.  Dose increased on fluoxetine from 10 to 20 mg at last visit.  This is working much better for her, her mood is improved and anxiety has been reduced substantially.  Denies any side effects related to medication including nausea, irritability, jitteriness  Depression Screening    Little interest or pleasure in doing things?  0 - not at all  Feeling down, depressed , or hopeless? 0 - not at all  Patient Health Questionnaire Score: 0    If depressive symptoms identified deferred to follow up visit unless specifically addressed in assesment and plan.      Interpretation of PHQ-9 Total Score   Score Severity   1-4 Minimal Depression   5-9 Mild Depression   10-14 Moderate Depression   15-19 Moderately Severe Depression   20-27 Severe Depression    MEGA-7 Questionnaire    Feeling nervous, anxious, or on edge: Not at all  Not being able to sop or control worrying: Not at all  Worrying too much about different things: Several days  Trouble relaxing: Not at all  Being so restless that it's hard to sit still: Not at all  Becoming easily annoyed or irritable: Not at all  Feeling afraid as if something awful might happen: Not at all  Total: 1    Interpretation of MEGA 7 Total Score   Score Severity :  0-4 No Anxiety   5-9 Mild Anxiety  10-14 Moderate Anxiety  15-21 Severe Anxiety           Current medicines (including changes today)  Current Outpatient Medications   Medication Sig Dispense Refill   • methimazole (TAPAZOLE) 10 MG Tab Take 1 tablet by mouth every day. 30 tablet 1   • FLUoxetine (PROZAC) 20 MG Cap Take 1 capsule by mouth every day. 30 capsule 1   • fluticasone (FLONASE) 50  "MCG/ACT nasal spray Administer 1 Spray into affected nostril(S) every day. 16 g 3   • montelukast (SINGULAIR) 10 MG Tab Take 1 tablet by mouth every day. 30 tablet 1   • loratadine (CLARITIN) 10 MG Tab Take 10 mg by mouth every day.     • SUMAtriptan (IMITREX) 50 MG Tab Take 1 Tab by mouth Once PRN for Migraine for up to 1 dose. 10 Tab 3     No current facility-administered medications for this visit.     She  has a past medical history of Allergy, Asthma, Hyperthyroidism, Migraine, and Patient currently pregnant (6/10/2020).    ROS included above     Objective:     /70 (BP Location: Left arm, Patient Position: Sitting, BP Cuff Size: Large adult)   Pulse (!) 109   Temp 36.5 °C (97.7 °F) (Temporal)   Resp 20   Ht 1.753 m (5' 9\")   Wt 120 kg (264 lb 8.8 oz)   SpO2 96%  Body mass index is 39.07 kg/m².     Physical Exam:  General: Alert, oriented in no acute distress.  Eye contact is good, speech is normal, affect calm  HEENT: Posterior oropharynx with erythema, no lesions or exudate.  Nares with clear mucus.  T she is on Ms gray with good landmarks bilaterally. No lymphadenopathy.  Lungs: clear to auscultation bilaterally, normal effort, no wheeze/ rhonchi/ rales.  CV: regular rate and rhythm, S1, S2, no murmur  Ext: no edema, color normal, vascularity normal, temperature normal    Assessment and Plan:   The following treatment plan was discussed   1. Sore throat   rapid strep negative.  Lungs clear on exam, well oxygenated on room air.  She is vaccinated against COVID-19.  Symptomatic treatment for viral URI discussed, letter provided to excuse work absence today and tomorrow  POCT Rapid Strep A   2. Hyperthyroidism   recently started methimazole.  Thyroid uptake scan reviewed.  She has been referred to endocrinology and will schedule  TSH    FREE THYROXINE    TRIIDOTHYRONINE   3. Anxiety and depression   doing well at this time on fluoxetine 20 mg daily, will plan to continue with this       Followup: " Labs in 4 to 6 weeks         Please note that this dictation was created using voice recognition software. I have worked with consultants from the vendor as well as technical experts from [facility] to optimize the interface. I have made every reasonable attempt to correct obvious errors, but I expect that there are errors of grammar and possibly content that I did not discover before finalizing the note.        no

## 2025-04-26 ENCOUNTER — APPOINTMENT (OUTPATIENT)
Dept: RADIOLOGY | Facility: MEDICAL CENTER | Age: 30
End: 2025-04-26
Attending: EMERGENCY MEDICINE
Payer: COMMERCIAL

## 2025-04-26 ENCOUNTER — HOSPITAL ENCOUNTER (OUTPATIENT)
Facility: MEDICAL CENTER | Age: 30
End: 2025-04-28
Attending: EMERGENCY MEDICINE | Admitting: STUDENT IN AN ORGANIZED HEALTH CARE EDUCATION/TRAINING PROGRAM
Payer: COMMERCIAL

## 2025-04-26 DIAGNOSIS — K80.00 CALCULUS OF GALLBLADDER WITH ACUTE CHOLECYSTITIS WITHOUT OBSTRUCTION: ICD-10-CM

## 2025-04-26 DIAGNOSIS — R11.2 NAUSEA AND VOMITING, UNSPECIFIED VOMITING TYPE: ICD-10-CM

## 2025-04-26 DIAGNOSIS — D72.829 LEUKOCYTOSIS, UNSPECIFIED TYPE: ICD-10-CM

## 2025-04-26 DIAGNOSIS — R10.11 RIGHT UPPER QUADRANT ABDOMINAL PAIN: ICD-10-CM

## 2025-04-26 DIAGNOSIS — E66.01 MORBID OBESITY (HCC): ICD-10-CM

## 2025-04-26 DIAGNOSIS — K29.00 OTHER ACUTE GASTRITIS WITHOUT HEMORRHAGE: ICD-10-CM

## 2025-04-26 PROBLEM — E66.813 CLASS 3 SEVERE OBESITY DUE TO EXCESS CALORIES WITHOUT SERIOUS COMORBIDITY WITH BODY MASS INDEX (BMI) OF 45.0 TO 49.9 IN ADULT: Status: ACTIVE | Noted: 2018-02-13

## 2025-04-26 PROBLEM — K21.9 GASTROESOPHAGEAL REFLUX DISEASE WITHOUT ESOPHAGITIS: Status: ACTIVE | Noted: 2025-04-26

## 2025-04-26 LAB
ALBUMIN SERPL BCP-MCNC: 4.3 G/DL (ref 3.2–4.9)
ALBUMIN/GLOB SERPL: 1.4 G/DL
ALP SERPL-CCNC: 70 U/L (ref 30–99)
ALT SERPL-CCNC: 31 U/L (ref 2–50)
ANION GAP SERPL CALC-SCNC: 12 MMOL/L (ref 7–16)
APPEARANCE UR: CLEAR
AST SERPL-CCNC: 21 U/L (ref 12–45)
BASOPHILS # BLD AUTO: 0.3 % (ref 0–1.8)
BASOPHILS # BLD: 0.05 K/UL (ref 0–0.12)
BILIRUB SERPL-MCNC: 0.7 MG/DL (ref 0.1–1.5)
BILIRUB UR QL STRIP.AUTO: NEGATIVE
BUN SERPL-MCNC: 14 MG/DL (ref 8–22)
CALCIUM ALBUM COR SERPL-MCNC: 8.6 MG/DL (ref 8.5–10.5)
CALCIUM SERPL-MCNC: 8.8 MG/DL (ref 8.5–10.5)
CHLORIDE SERPL-SCNC: 100 MMOL/L (ref 96–112)
CO2 SERPL-SCNC: 20 MMOL/L (ref 20–33)
COLOR UR: YELLOW
CREAT SERPL-MCNC: 0.95 MG/DL (ref 0.5–1.4)
EKG IMPRESSION: NORMAL
EOSINOPHIL # BLD AUTO: 0.03 K/UL (ref 0–0.51)
EOSINOPHIL NFR BLD: 0.2 % (ref 0–6.9)
ERYTHROCYTE [DISTWIDTH] IN BLOOD BY AUTOMATED COUNT: 39.5 FL (ref 35.9–50)
GFR SERPLBLD CREATININE-BSD FMLA CKD-EPI: 82 ML/MIN/1.73 M 2
GLOBULIN SER CALC-MCNC: 3.1 G/DL (ref 1.9–3.5)
GLUCOSE SERPL-MCNC: 131 MG/DL (ref 65–99)
GLUCOSE UR STRIP.AUTO-MCNC: NEGATIVE MG/DL
HCT VFR BLD AUTO: 46.8 % (ref 37–47)
HGB BLD-MCNC: 15.5 G/DL (ref 12–16)
IMM GRANULOCYTES # BLD AUTO: 0.07 K/UL (ref 0–0.11)
IMM GRANULOCYTES NFR BLD AUTO: 0.4 % (ref 0–0.9)
KETONES UR STRIP.AUTO-MCNC: ABNORMAL MG/DL
LEUKOCYTE ESTERASE UR QL STRIP.AUTO: NEGATIVE
LIPASE SERPL-CCNC: 20 U/L (ref 11–82)
LYMPHOCYTES # BLD AUTO: 2.44 K/UL (ref 1–4.8)
LYMPHOCYTES NFR BLD: 14.8 % (ref 22–41)
MCH RBC QN AUTO: 28.3 PG (ref 27–33)
MCHC RBC AUTO-ENTMCNC: 33.1 G/DL (ref 32.2–35.5)
MCV RBC AUTO: 85.6 FL (ref 81.4–97.8)
MICRO URNS: ABNORMAL
MONOCYTES # BLD AUTO: 0.83 K/UL (ref 0–0.85)
MONOCYTES NFR BLD AUTO: 5 % (ref 0–13.4)
NEUTROPHILS # BLD AUTO: 13.12 K/UL (ref 1.82–7.42)
NEUTROPHILS NFR BLD: 79.3 % (ref 44–72)
NITRITE UR QL STRIP.AUTO: NEGATIVE
NRBC # BLD AUTO: 0 K/UL
NRBC BLD-RTO: 0 /100 WBC (ref 0–0.2)
PH UR STRIP.AUTO: 6 [PH] (ref 5–8)
PLATELET # BLD AUTO: 284 K/UL (ref 164–446)
PMV BLD AUTO: 10.6 FL (ref 9–12.9)
POTASSIUM SERPL-SCNC: 4 MMOL/L (ref 3.6–5.5)
PROT SERPL-MCNC: 7.4 G/DL (ref 6–8.2)
PROT UR QL STRIP: NEGATIVE MG/DL
RBC # BLD AUTO: 5.47 M/UL (ref 4.2–5.4)
RBC UR QL AUTO: NEGATIVE
SODIUM SERPL-SCNC: 132 MMOL/L (ref 135–145)
SP GR UR STRIP.AUTO: >1.035
T4 FREE SERPL-MCNC: 1.42 NG/DL (ref 0.93–1.7)
TROPONIN T SERPL-MCNC: <6 NG/L (ref 6–19)
TSH SERPL DL<=0.005 MIU/L-ACNC: 0.44 UIU/ML (ref 0.38–5.33)
UROBILINOGEN UR STRIP.AUTO-MCNC: 1 EU/DL
WBC # BLD AUTO: 16.5 K/UL (ref 4.8–10.8)

## 2025-04-26 PROCEDURE — 700105 HCHG RX REV CODE 258: Performed by: STUDENT IN AN ORGANIZED HEALTH CARE EDUCATION/TRAINING PROGRAM

## 2025-04-26 PROCEDURE — G0378 HOSPITAL OBSERVATION PER HR: HCPCS

## 2025-04-26 PROCEDURE — 81003 URINALYSIS AUTO W/O SCOPE: CPT

## 2025-04-26 PROCEDURE — 700117 HCHG RX CONTRAST REV CODE 255: Performed by: EMERGENCY MEDICINE

## 2025-04-26 PROCEDURE — 84484 ASSAY OF TROPONIN QUANT: CPT

## 2025-04-26 PROCEDURE — 99285 EMERGENCY DEPT VISIT HI MDM: CPT

## 2025-04-26 PROCEDURE — 700102 HCHG RX REV CODE 250 W/ 637 OVERRIDE(OP): Performed by: STUDENT IN AN ORGANIZED HEALTH CARE EDUCATION/TRAINING PROGRAM

## 2025-04-26 PROCEDURE — 76705 ECHO EXAM OF ABDOMEN: CPT

## 2025-04-26 PROCEDURE — 96372 THER/PROPH/DIAG INJ SC/IM: CPT

## 2025-04-26 PROCEDURE — 85025 COMPLETE CBC W/AUTO DIFF WBC: CPT

## 2025-04-26 PROCEDURE — 83690 ASSAY OF LIPASE: CPT

## 2025-04-26 PROCEDURE — 96365 THER/PROPH/DIAG IV INF INIT: CPT

## 2025-04-26 PROCEDURE — 700111 HCHG RX REV CODE 636 W/ 250 OVERRIDE (IP): Mod: JZ | Performed by: STUDENT IN AN ORGANIZED HEALTH CARE EDUCATION/TRAINING PROGRAM

## 2025-04-26 PROCEDURE — 84443 ASSAY THYROID STIM HORMONE: CPT

## 2025-04-26 PROCEDURE — A9270 NON-COVERED ITEM OR SERVICE: HCPCS | Performed by: STUDENT IN AN ORGANIZED HEALTH CARE EDUCATION/TRAINING PROGRAM

## 2025-04-26 PROCEDURE — 96375 TX/PRO/DX INJ NEW DRUG ADDON: CPT

## 2025-04-26 PROCEDURE — 80053 COMPREHEN METABOLIC PANEL: CPT

## 2025-04-26 PROCEDURE — 94760 N-INVAS EAR/PLS OXIMETRY 1: CPT

## 2025-04-26 PROCEDURE — 84439 ASSAY OF FREE THYROXINE: CPT

## 2025-04-26 PROCEDURE — 99223 1ST HOSP IP/OBS HIGH 75: CPT | Performed by: STUDENT IN AN ORGANIZED HEALTH CARE EDUCATION/TRAINING PROGRAM

## 2025-04-26 PROCEDURE — 36415 COLL VENOUS BLD VENIPUNCTURE: CPT

## 2025-04-26 PROCEDURE — 74177 CT ABD & PELVIS W/CONTRAST: CPT

## 2025-04-26 PROCEDURE — 700111 HCHG RX REV CODE 636 W/ 250 OVERRIDE (IP): Mod: JZ | Performed by: EMERGENCY MEDICINE

## 2025-04-26 PROCEDURE — 99232 SBSQ HOSP IP/OBS MODERATE 35: CPT | Performed by: INTERNAL MEDICINE

## 2025-04-26 PROCEDURE — 93005 ELECTROCARDIOGRAM TRACING: CPT | Mod: TC | Performed by: EMERGENCY MEDICINE

## 2025-04-26 PROCEDURE — 96376 TX/PRO/DX INJ SAME DRUG ADON: CPT

## 2025-04-26 PROCEDURE — 93005 ELECTROCARDIOGRAM TRACING: CPT | Mod: TC

## 2025-04-26 RX ORDER — ACETAMINOPHEN 325 MG/1
650 TABLET ORAL EVERY 6 HOURS PRN
Status: DISCONTINUED | OUTPATIENT
Start: 2025-04-26 | End: 2025-04-28 | Stop reason: HOSPADM

## 2025-04-26 RX ORDER — PROMETHAZINE HYDROCHLORIDE 25 MG/1
12.5-25 TABLET ORAL EVERY 4 HOURS PRN
Status: DISCONTINUED | OUTPATIENT
Start: 2025-04-26 | End: 2025-04-28 | Stop reason: HOSPADM

## 2025-04-26 RX ORDER — ONDANSETRON 4 MG/1
4 TABLET, ORALLY DISINTEGRATING ORAL EVERY 4 HOURS PRN
Status: DISCONTINUED | OUTPATIENT
Start: 2025-04-26 | End: 2025-04-28 | Stop reason: HOSPADM

## 2025-04-26 RX ORDER — METRONIDAZOLE 500 MG/100ML
500 INJECTION, SOLUTION INTRAVENOUS ONCE
Status: COMPLETED | OUTPATIENT
Start: 2025-04-26 | End: 2025-04-26

## 2025-04-26 RX ORDER — SODIUM CHLORIDE, SODIUM LACTATE, POTASSIUM CHLORIDE, CALCIUM CHLORIDE 600; 310; 30; 20 MG/100ML; MG/100ML; MG/100ML; MG/100ML
INJECTION, SOLUTION INTRAVENOUS CONTINUOUS
Status: DISCONTINUED | OUTPATIENT
Start: 2025-04-26 | End: 2025-04-28 | Stop reason: HOSPADM

## 2025-04-26 RX ORDER — MORPHINE SULFATE 4 MG/ML
2 INJECTION INTRAVENOUS ONCE
Status: COMPLETED | OUTPATIENT
Start: 2025-04-26 | End: 2025-04-26

## 2025-04-26 RX ORDER — LORATADINE 10 MG/1
10 TABLET ORAL EVERY MORNING
COMMUNITY

## 2025-04-26 RX ORDER — PROCHLORPERAZINE EDISYLATE 5 MG/ML
5-10 INJECTION INTRAMUSCULAR; INTRAVENOUS EVERY 4 HOURS PRN
Status: DISCONTINUED | OUTPATIENT
Start: 2025-04-26 | End: 2025-04-28 | Stop reason: HOSPADM

## 2025-04-26 RX ORDER — ONDANSETRON 2 MG/ML
4 INJECTION INTRAMUSCULAR; INTRAVENOUS ONCE
Status: COMPLETED | OUTPATIENT
Start: 2025-04-26 | End: 2025-04-26

## 2025-04-26 RX ORDER — PROMETHAZINE HYDROCHLORIDE 25 MG/1
12.5-25 SUPPOSITORY RECTAL EVERY 4 HOURS PRN
Status: DISCONTINUED | OUTPATIENT
Start: 2025-04-26 | End: 2025-04-28 | Stop reason: HOSPADM

## 2025-04-26 RX ORDER — OMEPRAZOLE 20 MG/1
20 CAPSULE, DELAYED RELEASE ORAL DAILY
Status: DISCONTINUED | OUTPATIENT
Start: 2025-04-26 | End: 2025-04-28 | Stop reason: HOSPADM

## 2025-04-26 RX ORDER — MORPHINE SULFATE 4 MG/ML
4 INJECTION INTRAVENOUS ONCE
Status: COMPLETED | OUTPATIENT
Start: 2025-04-26 | End: 2025-04-26

## 2025-04-26 RX ORDER — OXYCODONE HYDROCHLORIDE 5 MG/1
5 TABLET ORAL
Refills: 0 | Status: DISCONTINUED | OUTPATIENT
Start: 2025-04-26 | End: 2025-04-28 | Stop reason: HOSPADM

## 2025-04-26 RX ORDER — CEFTRIAXONE 2 G/1
2000 INJECTION, POWDER, FOR SOLUTION INTRAMUSCULAR; INTRAVENOUS ONCE
Status: COMPLETED | OUTPATIENT
Start: 2025-04-26 | End: 2025-04-26

## 2025-04-26 RX ORDER — HYDRALAZINE HYDROCHLORIDE 20 MG/ML
10 INJECTION INTRAMUSCULAR; INTRAVENOUS EVERY 4 HOURS PRN
Status: DISCONTINUED | OUTPATIENT
Start: 2025-04-26 | End: 2025-04-28 | Stop reason: HOSPADM

## 2025-04-26 RX ORDER — ENOXAPARIN SODIUM 100 MG/ML
40 INJECTION SUBCUTANEOUS DAILY
Status: DISCONTINUED | OUTPATIENT
Start: 2025-04-26 | End: 2025-04-28 | Stop reason: HOSPADM

## 2025-04-26 RX ORDER — MORPHINE SULFATE 4 MG/ML
4 INJECTION INTRAVENOUS
Status: DISCONTINUED | OUTPATIENT
Start: 2025-04-26 | End: 2025-04-28 | Stop reason: HOSPADM

## 2025-04-26 RX ORDER — ONDANSETRON 2 MG/ML
4 INJECTION INTRAMUSCULAR; INTRAVENOUS EVERY 4 HOURS PRN
Status: DISCONTINUED | OUTPATIENT
Start: 2025-04-26 | End: 2025-04-28 | Stop reason: HOSPADM

## 2025-04-26 RX ORDER — OXYCODONE HYDROCHLORIDE 10 MG/1
10 TABLET ORAL
Refills: 0 | Status: DISCONTINUED | OUTPATIENT
Start: 2025-04-26 | End: 2025-04-28 | Stop reason: HOSPADM

## 2025-04-26 RX ADMIN — OMEPRAZOLE 20 MG: 20 CAPSULE, DELAYED RELEASE ORAL at 06:00

## 2025-04-26 RX ADMIN — MORPHINE SULFATE 2 MG: 4 INJECTION, SOLUTION INTRAMUSCULAR; INTRAVENOUS at 03:09

## 2025-04-26 RX ADMIN — CEFTRIAXONE SODIUM 2000 MG: 2 INJECTION, POWDER, FOR SOLUTION INTRAMUSCULAR; INTRAVENOUS at 03:10

## 2025-04-26 RX ADMIN — METRONIDAZOLE 500 MG: 500 INJECTION, SOLUTION INTRAVENOUS at 03:11

## 2025-04-26 RX ADMIN — IOHEXOL 100 ML: 350 INJECTION, SOLUTION INTRAVENOUS at 01:57

## 2025-04-26 RX ADMIN — MORPHINE SULFATE 4 MG: 4 INJECTION, SOLUTION INTRAMUSCULAR; INTRAVENOUS at 00:52

## 2025-04-26 RX ADMIN — SODIUM CHLORIDE, POTASSIUM CHLORIDE, SODIUM LACTATE AND CALCIUM CHLORIDE: 600; 310; 30; 20 INJECTION, SOLUTION INTRAVENOUS at 17:09

## 2025-04-26 RX ADMIN — ACETAMINOPHEN 650 MG: 325 TABLET ORAL at 15:56

## 2025-04-26 RX ADMIN — ENOXAPARIN SODIUM 40 MG: 100 INJECTION SUBCUTANEOUS at 17:10

## 2025-04-26 RX ADMIN — SODIUM CHLORIDE, POTASSIUM CHLORIDE, SODIUM LACTATE AND CALCIUM CHLORIDE: 600; 310; 30; 20 INJECTION, SOLUTION INTRAVENOUS at 05:00

## 2025-04-26 RX ADMIN — MORPHINE SULFATE 4 MG: 4 INJECTION, SOLUTION INTRAMUSCULAR; INTRAVENOUS at 09:12

## 2025-04-26 RX ADMIN — ONDANSETRON 4 MG: 2 INJECTION INTRAMUSCULAR; INTRAVENOUS at 03:09

## 2025-04-26 RX ADMIN — ONDANSETRON 4 MG: 2 INJECTION INTRAMUSCULAR; INTRAVENOUS at 00:52

## 2025-04-26 SDOH — ECONOMIC STABILITY: TRANSPORTATION INSECURITY
IN THE PAST 12 MONTHS, HAS THE LACK OF TRANSPORTATION KEPT YOU FROM MEDICAL APPOINTMENTS OR FROM GETTING MEDICATIONS?: NO

## 2025-04-26 SDOH — ECONOMIC STABILITY: TRANSPORTATION INSECURITY
IN THE PAST 12 MONTHS, HAS LACK OF RELIABLE TRANSPORTATION KEPT YOU FROM MEDICAL APPOINTMENTS, MEETINGS, WORK OR FROM GETTING THINGS NEEDED FOR DAILY LIVING?: NO

## 2025-04-26 ASSESSMENT — SOCIAL DETERMINANTS OF HEALTH (SDOH)
WITHIN THE LAST YEAR, HAVE TO BEEN RAPED OR FORCED TO HAVE ANY KIND OF SEXUAL ACTIVITY BY YOUR PARTNER OR EX-PARTNER?: NO
WITHIN THE PAST 12 MONTHS, THE FOOD YOU BOUGHT JUST DIDN'T LAST AND YOU DIDN'T HAVE MONEY TO GET MORE: NEVER TRUE
WITHIN THE LAST YEAR, HAVE YOU BEEN HUMILIATED OR EMOTIONALLY ABUSED IN OTHER WAYS BY YOUR PARTNER OR EX-PARTNER?: NO
WITHIN THE PAST 12 MONTHS, YOU WORRIED THAT YOUR FOOD WOULD RUN OUT BEFORE YOU GOT THE MONEY TO BUY MORE: NEVER TRUE
WITHIN THE LAST YEAR, HAVE YOU BEEN AFRAID OF YOUR PARTNER OR EX-PARTNER?: NO
WITHIN THE LAST YEAR, HAVE YOU BEEN KICKED, HIT, SLAPPED, OR OTHERWISE PHYSICALLY HURT BY YOUR PARTNER OR EX-PARTNER?: NO
IN THE PAST 12 MONTHS, HAS THE ELECTRIC, GAS, OIL, OR WATER COMPANY THREATENED TO SHUT OFF SERVICE IN YOUR HOME?: NO

## 2025-04-26 ASSESSMENT — PATIENT HEALTH QUESTIONNAIRE - PHQ9
8. MOVING OR SPEAKING SO SLOWLY THAT OTHER PEOPLE COULD HAVE NOTICED. OR THE OPPOSITE, BEING SO FIGETY OR RESTLESS THAT YOU HAVE BEEN MOVING AROUND A LOT MORE THAN USUAL: SEVERAL DAYS
SUM OF ALL RESPONSES TO PHQ QUESTIONS 1-9: 11
SUM OF ALL RESPONSES TO PHQ9 QUESTIONS 1 AND 2: 2
2. FEELING DOWN, DEPRESSED, IRRITABLE, OR HOPELESS: SEVERAL DAYS
7. TROUBLE CONCENTRATING ON THINGS, SUCH AS READING THE NEWSPAPER OR WATCHING TELEVISION: NOT AT ALL
3. TROUBLE FALLING OR STAYING ASLEEP OR SLEEPING TOO MUCH: NEARLY EVERY DAY
5. POOR APPETITE OR OVEREATING: NEARLY EVERY DAY
4. FEELING TIRED OR HAVING LITTLE ENERGY: MORE THAN HALF THE DAYS
6. FEELING BAD ABOUT YOURSELF - OR THAT YOU ARE A FAILURE OR HAVE LET YOURSELF OR YOUR FAMILY DOWN: NOT AL ALL
9. THOUGHTS THAT YOU WOULD BE BETTER OFF DEAD, OR OF HURTING YOURSELF: NOT AT ALL
1. LITTLE INTEREST OR PLEASURE IN DOING THINGS: SEVERAL DAYS

## 2025-04-26 ASSESSMENT — COGNITIVE AND FUNCTIONAL STATUS - GENERAL
MOBILITY SCORE: 24
DAILY ACTIVITIY SCORE: 24
SUGGESTED CMS G CODE MODIFIER DAILY ACTIVITY: CH
SUGGESTED CMS G CODE MODIFIER MOBILITY: CH

## 2025-04-26 ASSESSMENT — ENCOUNTER SYMPTOMS
ABDOMINAL PAIN: 1
FEVER: 0
CARDIOVASCULAR NEGATIVE: 1
SHORTNESS OF BREATH: 0
PSYCHIATRIC NEGATIVE: 1
RESPIRATORY NEGATIVE: 1
VOMITING: 1
COUGH: 0
EYES NEGATIVE: 1
CHILLS: 0
ROS GI COMMENTS: RIGHT-SIDED ABDOMINAL PAIN
MUSCULOSKELETAL NEGATIVE: 1
NAUSEA: 1
DIARRHEA: 1
NEUROLOGICAL NEGATIVE: 1

## 2025-04-26 ASSESSMENT — LIFESTYLE VARIABLES
TOTAL SCORE: 0
AVERAGE NUMBER OF DAYS PER WEEK YOU HAVE A DRINK CONTAINING ALCOHOL: 1
TOTAL SCORE: 0
HOW MANY TIMES IN THE PAST YEAR HAVE YOU HAD 5 OR MORE DRINKS IN A DAY: 0
ON A TYPICAL DAY WHEN YOU DRINK ALCOHOL HOW MANY DRINKS DO YOU HAVE: 1
DOES PATIENT WANT TO STOP DRINKING: NO
CONSUMPTION TOTAL: NEGATIVE
HAVE PEOPLE ANNOYED YOU BY CRITICIZING YOUR DRINKING: NO
TOTAL SCORE: 0
HAVE YOU EVER FELT YOU SHOULD CUT DOWN ON YOUR DRINKING: NO
ALCOHOL_USE: YES
EVER FELT BAD OR GUILTY ABOUT YOUR DRINKING: NO
EVER HAD A DRINK FIRST THING IN THE MORNING TO STEADY YOUR NERVES TO GET RID OF A HANGOVER: NO

## 2025-04-26 ASSESSMENT — PAIN DESCRIPTION - PAIN TYPE
TYPE: ACUTE PAIN

## 2025-04-26 ASSESSMENT — PAIN DESCRIPTION - DESCRIPTORS: DESCRIPTORS: BURNING

## 2025-04-26 ASSESSMENT — FIBROSIS 4 INDEX: FIB4 SCORE: 1.4

## 2025-04-26 NOTE — CARE PLAN
The patient is Stable - Low risk of patient condition declining or worsening    Shift Goals  Clinical Goals: Pain control, NPO for possible surgery today, no falls or injuries  Patient Goals: Rest, surgery    Progress made toward(s) clinical / shift goals:  Pt pain controlled with PRN medications, tolerating clear liquids, no falls or injuries this shift.     Patient is not progressing towards the following goals:

## 2025-04-26 NOTE — PROGRESS NOTES
Pt arrived via gurney, admitted to room 221 from ER at 11:35. Pt is A&Ox4, pain tolerable at this time. Oriented to room call light. Reviewed plan of care with the patient and the family. Fall precaution in place. Bed locked. Bet at lowest position. Call light within reach. Encouraged pt the importance to call for assistance.

## 2025-04-26 NOTE — ASSESSMENT & PLAN NOTE
Right upper quadrant and epigastric pain after eating beef jerky.  Known history of cholelithiasis. CT abdomen and pelvis showed cholelithiasis with no other acute abnormalities. LFTs were unremarkable. WBCs were elevated on admission. Patient was evaluated by general surgery. Patient has been planned for cholecystectomy today.

## 2025-04-26 NOTE — ED NOTES
Left foot and wound cleaned with NS.  PSO applied to suture site (3 sutures noted), non-adherent dressing placed, held with rolled gauze and tape.  Patient tolerated well.   Pt resting in bed with equal chest rise and fall observed. No pt needs at this time. No s/s of distress noted.  Medicated per MAR

## 2025-04-26 NOTE — ED NOTES
Pharmacy Medication Reconciliation      ~Medication reconciliation updated and complete per patient at bedside   ~Allergies have been verified and updated   ~No oral ABX within the last 30 days  ~Is dispense history available in EPIC: no   ~Patient home pharmacy :  [facility]      ~Anticoagulants (rivaroxaban, apixaban, edoxaban, dabigatran, warfarin, enoxaparin) taken in the last 14 days? No

## 2025-04-26 NOTE — ASSESSMENT & PLAN NOTE
History of hyperthyroidism. TSH from 4/26/2025 was 0.438, free T4 is 1.42. Continue outpatient follow-up

## 2025-04-26 NOTE — ED TRIAGE NOTES
"Chief Complaint   Patient presents with    Abdominal Pain     Pt reports she needs he gallbladder out but has not been able to get it done, states she last ate approx 1600 & has been having extreme abd pain starting at 1900    N/V     Unable to keep anything down    Chest Pain     Midsternal   Pt also reports bright red blood in vomit   \  BP (!) 183/99   Pulse 75   Temp 36.3 °C (97.4 °F) (Temporal)   Resp (!) 22   Ht 1.753 m (5' 9\")   Wt (!) 143 kg (315 lb 11.2 oz)   SpO2 98%   BMI 46.62 kg/m²     "

## 2025-04-26 NOTE — PROGRESS NOTES
4 Eyes Skin Assessment Completed by Talia, RN and Ken, RN.    Head WDL  Ears WDL  Nose WDL  Mouth WDL  Neck WDL  Breast/Chest redness, rash under breasts  Shoulder Blades WDL  Spine WDL  (R) Arm/Elbow/Hand WDL  (L) Arm/Elbow/Hand WDL  Abdomen Scar  Groin WDL  Scrotum/Coccyx/Buttocks WDL  (R) Leg WDL  (L) Leg WDL  (R) Heel/Foot/Toe Dry, crack on heel, scab on toe   (L) Heel/Foot/Toe Dry, crack on heel          Devices In Places Pulse Ox      Interventions In Place Pillows, interdry    Possible Skin Injury No    Pictures Uploaded Into Epic N/A  Wound Consult Placed N/A  RN Wound Prevention Protocol Ordered Yes

## 2025-04-26 NOTE — DISCHARGE PLANNING
Patient status updated to OBSERVATION per attending physician determination, Dr. Mary Pablo, and UR committee MD secondary review, Dr. Hola Vilchis. Patient Status Update completed.

## 2025-04-26 NOTE — ED TRIAGE NOTES
Patient ambulates to Er with a chief complaint of abdominal and chest pain with nausea and vomiting that started around 1900 and has gotten worse. Patient states that she usually feels this pain when she is passing a gallstone, patient also states that today she threw up bright red blood. Patient denies any urinary or bowel movement issues.

## 2025-04-26 NOTE — ED PROVIDER NOTES
ED Provider Note    CHIEF COMPLAINT  Chief Complaint   Patient presents with    Abdominal Pain     Pt reports she needs he gallbladder out but has not been able to get it done, states she last ate approx 1600 & has been having extreme abd pain starting at 1900    N/V     Unable to keep anything down    Chest Pain     Midsternal   Pt also reports bright red blood in vomit       EXTERNAL RECORDS REVIEWED  No recent pertinent medical records.    HPI/ROS  LIMITATION TO HISTORY   None  OUTSIDE HISTORIAN(S):  None    Damari Hansen is a 29 y.o. female who presents tonight with her significant other with a chief complaint of sudden onset of epigastric and right upper quadrant abdominal pain after she ate some beef jerky around 7 PM this evening.  She has a known history of gallbladder disease but has not been able to get anything done because of her children.  She states tonight she ate the jerky then began vomiting and thinks she may have vomited some blood as well.  She currently describes pain radiating up into her chest as well.    PAST MEDICAL HISTORY   has a past medical history of Allergy, Asthma, Hyperthyroidism, Migraine, and Patient currently pregnant (6/10/2020).    SURGICAL HISTORY   has a past surgical history that includes tonsillectomy.    FAMILY HISTORY  Family History   Problem Relation Age of Onset    Cancer Father         melanoma    Cancer Paternal Grandfather         lymphoma    Dementia Mother     Cancer Maternal Aunt         breast cancer    Dementia Maternal Grandmother     Dementia Maternal Grandfather     No Known Problems Paternal Grandmother     Cancer Maternal Aunt         lymphmoa       SOCIAL HISTORY  Social History     Tobacco Use    Smoking status: Never    Smokeless tobacco: Never   Vaping Use    Vaping status: Never Used   Substance and Sexual Activity    Alcohol use: Yes     Comment: rare    Drug use: No    Sexual activity: Yes     Partners: Male     Birth control/protection:  "I.U.D.       CURRENT MEDICATIONS  Home Medications    **Home medications have not yet been reviewed for this encounter**       Audit from Redirected Encounters    **Home medications have not yet been reviewed for this encounter**         ALLERGIES  Allergies   Allergen Reactions    Amoxicillin Hives and Rash     Rxn - 2016    Hydrocodone-Acetaminophen      Other reaction(s): hyperactiveness    Propylthiouracil      Other reaction(s): difficulty tolerating       PHYSICAL EXAM  VITAL SIGNS: /80   Pulse 81   Temp 36.3 °C (97.4 °F) (Temporal)   Resp 18   Ht 1.753 m (5' 9\")   Wt (!) 143 kg (315 lb 11.2 oz)   SpO2 97%   BMI 46.62 kg/m²    Constitutional: Patient is a very ill-appearing young female, pale and diaphoretic with active vomiting.  HENT: Normocephalic,  Nose normal with no mucosal edema or drainage. Oropharynx moist without erythema or exudates.  Eyes: PERRL, EOMI, Conjunctiva without erythema   Neck: Supple   Cardiovascular: Normal heart rate and Regular rhythm. No murmur  Thorax & Lungs: Clear and equal breath sounds with good excursion. No respiratory distress, no rhonchi, wheezing, patient is hyperventilating secondary to pain.  Abdomen: Soft, morbidly obese with extreme epigastric and right upper quadrant tenderness upon palpation there is no flank tenderness.  She has normal bowel sounds.  Skin: Pale, cool and diaphoretic.  Extremities: Peripheral pulses 4/4 No edema, No tenderness  Musculoskeletal: Normal range of motion in all major joints. No tenderness to palpation or major deformities noted.   Neurologic: Alert & oriented x 3, Normal motor function, Normal sensory function  Psychiatric: Affect very anxious, hyperventilating    EKG/LABS  Results for orders placed or performed during the hospital encounter of 04/26/25   EKG    Collection Time: 04/26/25 12:31 AM   Result Value Ref Range    Report       [facility]    Test Date:  2025-04-26  Pt Name:   "  SOFIA HIGGINBOTHAM              Department: [facility]  MRN:        [id number]                      Room:  Gender:     Female                       Technician: [id number]  :        1995                   Requested By:ER TRIAGE PROTOCOL  Order #:    [id number]                    Mendez MD:    Measurements  Intervals                                Axis  Rate:       58                           P:          -26  PA:         110                          QRS:        65  QRSD:       105                          T:          34  QT:         466  QTc:        458    Interpretive Statements  Sinus rhythm  Borderline short PA interval  Compared to ECG 2024 07:54:04  No significant changes     CBC WITH DIFFERENTIAL    Collection Time: 25 12:43 AM   Result Value Ref Range    WBC 16.5 (H) 4.8 - 10.8 K/uL    RBC 5.47 (H) 4.20 - 5.40 M/uL    Hemoglobin 15.5 12.0 - 16.0 g/dL    Hematocrit 46.8 37.0 - 47.0 %    MCV 85.6 81.4 - 97.8 fL    MCH 28.3 27.0 - 33.0 pg    MCHC 33.1 32.2 - 35.5 g/dL    RDW 39.5 35.9 - 50.0 fL    Platelet Count 284 164 - 446 K/uL    MPV 10.6 9.0 - 12.9 fL    Neutrophils-Polys 79.30 (H) 44.00 - 72.00 %    Lymphocytes 14.80 (L) 22.00 - 41.00 %    Monocytes 5.00 0.00 - 13.40 %    Eosinophils 0.20 0.00 - 6.90 %    Basophils 0.30 0.00 - 1.80 %    Immature Granulocytes 0.40 0.00 - 0.90 %    Nucleated RBC 0.00 0.00 - 0.20 /100 WBC    Neutrophils (Absolute) 13.12 (H) 1.82 - 7.42 K/uL    Lymphs (Absolute) 2.44 1.00 - 4.80 K/uL    Monos (Absolute) 0.83 0.00 - 0.85 K/uL    Eos (Absolute) 0.03 0.00 - 0.51 K/uL    Baso (Absolute) 0.05 0.00 - 0.12 K/uL    Immature Granulocytes (abs) 0.07 0.00 - 0.11 K/uL    NRBC (Absolute) 0.00 K/uL   COMP METABOLIC PANEL    Collection Time: 25 12:43 AM   Result Value Ref Range    Sodium 132 (L) 135 - 145 mmol/L    Potassium 4.0 3.6 - 5.5 mmol/L    Chloride 100 96 - 112 mmol/L    Co2 20 20 - 33 mmol/L    Anion Gap 12.0 7.0 - 16.0    Glucose 131 (H) 65 - 99 mg/dL    Bun 14 8 - 22  mg/dL    Creatinine 0.95 0.50 - 1.40 mg/dL    Calcium 8.8 8.5 - 10.5 mg/dL    Correct Calcium 8.6 8.5 - 10.5 mg/dL    AST(SGOT) 21 12 - 45 U/L    ALT(SGPT) 31 2 - 50 U/L    Alkaline Phosphatase 70 30 - 99 U/L    Total Bilirubin 0.7 0.1 - 1.5 mg/dL    Albumin 4.3 3.2 - 4.9 g/dL    Total Protein 7.4 6.0 - 8.2 g/dL    Globulin 3.1 1.9 - 3.5 g/dL    A-G Ratio 1.4 g/dL   LIPASE    Collection Time: 04/26/25 12:43 AM   Result Value Ref Range    Lipase 20 11 - 82 U/L   TROPONIN    Collection Time: 04/26/25 12:43 AM   Result Value Ref Range    Troponin T <6 6 - 19 ng/L   ESTIMATED GFR    Collection Time: 04/26/25 12:43 AM   Result Value Ref Range    GFR (CKD-EPI) 82 >60 mL/min/1.73 m 2   URINALYSIS (UA)    Collection Time: 04/26/25  2:30 AM    Specimen: Urine   Result Value Ref Range    Color Yellow     Character Clear     Specific Gravity >1.035 (A) <1.035    Ph 6.0 5.0 - 8.0    Glucose Negative Negative mg/dL    Ketones Trace (A) Negative mg/dL    Protein Negative Negative mg/dL    Bilirubin Negative Negative    Urobilinogen, Urine 1.0 <=1.0 EU/dL    Nitrite Negative Negative    Leukocyte Esterase Negative Negative    Occult Blood Negative Negative    Micro Urine Req see below       I have independently interpreted this EKG    RADIOLOGY/PROCEDURES   I have independently interpreted the diagnostic imaging associated with this visit and am waiting the final reading from the radiologist.   My preliminary interpretation is as follows: Gallstones    Radiologist interpretation:  CT-ABDOMEN-PELVIS WITH   Final Result      1.  No acute abnormality in the abdomen or pelvis.   2.  Cholelithiasis.      US-RUQ   Final Result      1.  Very difficult study due to large body habitus and poor acoustic windows.   2.  Cholelithiasis. No cholecystitis.   3.  Slightly increased hepatic echogenicity, suggestive of mild steatosis and/or hepatocellular disease.           COURSE & MEDICAL DECISION MAKING    ASSESSMENT, COURSE AND PLAN  Care  Narrative: Patient received an IV of normal saline, laboratories were drawn, twelve-lead EKG shows sinus rhythm with no acute ST elevation or depression.  She was treated for her pain with morphine and Zofran and right upper quadrant abdominal ultrasound was ordered to rule out acute cholecystitis.    Patient's laboratories revealed an elevated white blood cell count of 16.5 with 79% neutrophils and 13% absolute neutrophils.  Her sodium slightly low at 132, glucose 131 LFTs are normal and her lipase is normal.  Urinalysis is negative.  Ultrasound was performed of her right upper quadrant and was indeterminant secondary to body habitus.  I then sent her for a CT abdomen and pelvis with IV contrast which was read out with cholelithiasis only no other acute abnormalities.  The patient had received a dose of morphine and Zofran originally and this provided adequate pain control for a period of time.  She is now continuing to have increased pain and nausea and will be remedicated with an additional dose of morphine and Zofran.  3:00 am-I spoke with Dr. Fleming on-call for general surgery.  He will evaluate the patient in the morning.  3:18 am -I spoke with Dr. Jiang on-call for hospitalist and he will admit the patient into the hospital.  She is currently in guarded condition.            DISPOSITION AND DISCUSSIONS  I have discussed management of the patient with the following physicians and MEENA's: Dr. Jiang-hospitalist, Dr. Fleming-General Surgery    Discussion of management with other [facility] or appropriate source(s): None     Barriers to care at this time, including but not limited to: None.     Decision tools and prescription drugs considered including, but not limited to: Patient will be hospitalized for pain control, treatment for her nausea vomiting, surgical consultation with possible intervention, IV antibiotics.    FINAL DIAGNOSIS  1. Right upper quadrant abdominal pain    2. Nausea and vomiting, unspecified vomiting  type    3. Leukocytosis, unspecified type    4. Calculus of gallbladder with acute cholecystitis without obstruction    5. Morbid obesity (HCC)         Electronically signed by: Sanjana Hansen D.O., 4/26/2025 1:01 AM

## 2025-04-26 NOTE — PROGRESS NOTES
Hospital Medicine Daily Progress Note    Date of Service  4/26/2025    Chief Complaint  Damari Hansen is a 29 y.o. female admitted 4/26/2025 with right upper quadrant/gastric abdominal pain, nausea, and vomiting which started after eating beef jerky the previous evening.  She is morbidly obese, and has a history of hyperthyroidism and GERD.    Hospital Course  On admission, patient was afebrile.  WBCs were 16.5, sodium was 132, LFTs were unremarkable. Ultrasound showed cholelithiasis with no evidence of cholecystitis.  CT abdomen and pelvis showed cholelithiasis with no other acute abnormality in the abdomen or pelvis.  General surgery consulted.    Interval Problem Update  Patient was evaluated by general surgery. Per General Surgery, patient has been planned for cholecystectomy on 4/27/2025    I have discussed this patient's plan of care and discharge plan at IDT rounds today with Case Management, Nursing, Nursing leadership, and other members of the IDT team.    Consultants/Specialty  general surgery    Code Status  Full Code    Disposition  The patient is not medically cleared for discharge to home or a post-acute facility.  Anticipate discharge to: home with close outpatient follow-up    I have placed the appropriate orders for post-discharge needs.    Review of Systems  Review of Systems   Constitutional:  Positive for malaise/fatigue.   HENT: Negative.     Eyes: Negative.    Respiratory: Negative.     Cardiovascular: Negative.    Gastrointestinal:  Positive for abdominal pain.        Right-sided abdominal pain   Genitourinary: Negative.    Musculoskeletal: Negative.    Skin: Negative.    Neurological: Negative.    Endo/Heme/Allergies: Negative.    Psychiatric/Behavioral: Negative.          Physical Exam  Temp:  [36.1 °C (97 °F)-36.8 °C (98.3 °F)] 36.8 °C (98.3 °F)  Pulse:  [60-87] 70  Resp:  [18-27] 18  BP: ()/(52-99) 120/79  SpO2:  [91 %-98 %] 96 %    Physical Exam  Constitutional:        Appearance: She is obese.   HENT:      Head: Normocephalic.      Nose: Nose normal.      Mouth/Throat:      Mouth: Mucous membranes are moist.   Eyes:      Pupils: Pupils are equal, round, and reactive to light.   Cardiovascular:      Rate and Rhythm: Normal rate.   Pulmonary:      Effort: Pulmonary effort is normal.   Abdominal:      Palpations: Abdomen is soft.      Tenderness: There is abdominal tenderness.      Comments: Tender in right mid abdomen   Musculoskeletal:      Cervical back: Normal range of motion.      Right lower leg: No edema.      Left lower leg: No edema.   Skin:     General: Skin is warm.   Neurological:      General: No focal deficit present.      Mental Status: She is alert.   Psychiatric:         Mood and Affect: Mood normal.         Fluids    Intake/Output Summary (Last 24 hours) at 4/26/2025 1424  Last data filed at 4/26/2025 1400  Gross per 24 hour   Intake 120 ml   Output --   Net 120 ml        Laboratory  Recent Labs     04/26/25  0043   WBC 16.5*   RBC 5.47*   HEMOGLOBIN 15.5   HEMATOCRIT 46.8   MCV 85.6   MCH 28.3   MCHC 33.1   RDW 39.5   PLATELETCT 284   MPV 10.6     Recent Labs     04/26/25  0043   SODIUM 132*   POTASSIUM 4.0   CHLORIDE 100   CO2 20   GLUCOSE 131*   BUN 14   CREATININE 0.95   CALCIUM 8.8                   Imaging  CT-ABDOMEN-PELVIS WITH   Final Result      1.  No acute abnormality in the abdomen or pelvis.   2.  Cholelithiasis.      US-RUQ   Final Result      1.  Very difficult study due to large body habitus and poor acoustic windows.   2.  Cholelithiasis. No cholecystitis.   3.  Slightly increased hepatic echogenicity, suggestive of mild steatosis and/or hepatocellular disease.           Assessment/Plan  * Right upper quadrant pain- (present on admission)  Assessment & Plan  Right upper quadrant and epigastric pain after eating beef jerky.  Known history of cholelithiasis. CT abdomen and pelvis showed cholelithiasis with no other acute abnormalities. LFTs were  unremarkable. WBCs were elevated on admission. Patient was evaluated by general surgery. Per General Surgery, patient has been planned for cholecystectomy on 4/27/2025. NPO at midnight    Gastroesophageal reflux disease without esophagitis- (present on admission)  Assessment & Plan  Continue omeprazole.    Class 3 severe obesity due to excess calories without serious comorbidity with body mass index (BMI) of 45.0 to 49.9 in adult- (present on admission)  Assessment & Plan  Morbidly obese with BMI of 46.62 kg/m².  Recommend outpatient follow-up for weight loss and management    Hyperthyroidism- (present on admission)  Assessment & Plan  History of hyperthyroidism.  TSH from today 0.438, free T4 is 1.42 continue outpatient follow-up         VTE prophylaxis: SCDs

## 2025-04-26 NOTE — ASSESSMENT & PLAN NOTE
Morbidly obese with BMI of 46.62 kg/m².  Recommend outpatient follow-up for weight loss and management

## 2025-04-26 NOTE — CONSULTS
Date of Service: 4/26/2025    PCP: Krystle Murphy M.D.        HPI: This is a 29 y.o. female with biliary colic.  She presented with severe right upper quadrant pain.  This has now improved and she is relatively pain-free.  She states that she has had similar more mild episodes in the past.  She has known gallstones and a cholecystectomy has been recommended.  She has not had any jaundice or acholic stools.  Laying in bed and in no distress    ROS: As above. The remainder of a complete review of systems is negative in all systems except as noted.    PMHx:  Active Ambulatory Problems     Diagnosis Date Noted    Hyperthyroidism 02/13/2018    Class 3 severe obesity due to excess calories without serious comorbidity with body mass index (BMI) of 45.0 to 49.9 in adult 02/13/2018    Pain in joint, lower leg 07/17/2008    Other meniscus derangements, unspecified lateral meniscus, unspecified knee 07/10/2008    Non-toxic goiter 06/10/2020    Mild persistent asthma 09/17/2007    Cough 11/15/2006    Asthma 09/14/2007    Allergic rhinitis 11/15/2006    IUD (intrauterine device) in place 08/11/2020    Anxiety and depression 04/15/2021    Nasal congestion 09/14/2021     Resolved Ambulatory Problems     Diagnosis Date Noted    Sepsis (HCC) 02/12/2018    Patient currently pregnant 06/10/2020     Past Medical History:   Diagnosis Date    Allergy     Migraine        SHx:  Social History     Socioeconomic History    Marital status: Single     Spouse name: Not on file    Number of children: Not on file    Years of education: Not on file    Highest education level: Not on file   Occupational History    Not on file   Tobacco Use    Smoking status: Never    Smokeless tobacco: Never   Vaping Use    Vaping status: Never Used   Substance and Sexual Activity    Alcohol use: Yes     Comment: rare    Drug use: No    Sexual activity: Yes     Partners: Male     Birth control/protection: I.U.D.   Other Topics Concern    Not on file    Social History Narrative    Not on file     Social Drivers of Health     Financial Resource Strain: Not on file   Food Insecurity: Not on file   Transportation Needs: Not on file   Physical Activity: Not on file   Stress: Not on file   Social Connections: Not on file   Intimate Partner Violence: Not on file   Housing Stability: Not on file       FHx:  family history includes Cancer in her father, maternal aunt, maternal aunt, and paternal grandfather; Dementia in her maternal grandfather, maternal grandmother, and mother; No Known Problems in her paternal grandmother.    Allergies:  Allergies   Allergen Reactions    Amoxicillin Hives and Rash     Rxn - 2016    Hydrocodone Unspecified     Hyperactiveness      Propylthiouracil Unspecified     Other reaction(s): difficulty tolerating       Medications:  No current facility-administered medications on file prior to encounter.     Current Outpatient Medications on File Prior to Encounter   Medication Sig Dispense Refill    loratadine (CLARITIN) 10 MG Tab Take 10 mg by mouth every morning.      ASHWAGANDHA PO Take 1 Tablet by mouth every morning.      Multiple Vitamin (MULTIVITAMIN PO) Take 1 Tablet by mouth every morning.      ibuprofen (MOTRIN) 200 MG Tab Take 400 mg by mouth every 6 hours as needed for Mild Pain. Indications: Pain      levonorgestrel (MIRENA, 52 MG,) 20 MCG/DAY IUD 1 Each by Intrauterine route see administration instructions. Change every 5 years, last changed 6 years (9/4/2024)         Objective Exam:  Vitals:    04/26/25 0540 04/26/25 0554 04/26/25 0610 04/26/25 0633   BP:   97/52    Pulse: 65  65 69   Resp:  18  20   Temp:       TempSrc:       SpO2: 92%  94% 91%   Weight:       Height:           General: Laying in bed and in no distress, morbidly obese  HEENT: Pupils equally round reactive to light  Chest: Unlabored respirations with symmetrical chest excursion  CV: Regular rate and rhythm  Abd: Soft and nontender, obese  Ext: No clubbing cyanosis  "or edema  Neuro: Nonfocal exam  Skin: Warm and dry    Laboratory--reviewed personally and are as follows:  Lab Results   Component Value Date/Time    WBC 16.5 (H) 04/26/2025 12:43 AM    RBC 5.47 (H) 04/26/2025 12:43 AM    HEMOGLOBIN 15.5 04/26/2025 12:43 AM    HEMATOCRIT 46.8 04/26/2025 12:43 AM    MCV 85.6 04/26/2025 12:43 AM    MCH 28.3 04/26/2025 12:43 AM    MCHC 33.1 04/26/2025 12:43 AM    MPV 10.6 04/26/2025 12:43 AM    NEUTSPOLYS 79.30 (H) 04/26/2025 12:43 AM    LYMPHOCYTES 14.80 (L) 04/26/2025 12:43 AM    MONOCYTES 5.00 04/26/2025 12:43 AM    EOSINOPHILS 0.20 04/26/2025 12:43 AM    BASOPHILS 0.30 04/26/2025 12:43 AM      Lab Results   Component Value Date/Time    SODIUM 132 (L) 04/26/2025 12:43 AM    POTASSIUM 4.0 04/26/2025 12:43 AM    CHLORIDE 100 04/26/2025 12:43 AM    CO2 20 04/26/2025 12:43 AM    GLUCOSE 131 (H) 04/26/2025 12:43 AM    BUN 14 04/26/2025 12:43 AM    CREATININE 0.95 04/26/2025 12:43 AM      No results found for: \"PROTHROMBTM\", \"INR\"     Radiology  Gallstones, otherwise unremarkable      Assessment/Plan:  29-year-old woman with biliary colic.  The acute episodes appears to have resolved.  I did discuss with her that it would be reasonable for her to have a cholecystectomy during this admission.  The timing is unclear but the latest that would be done would be this Sunday, 4/27/2025.  She is not sure if she wants surgery during this admission as she has a trip planned next week.  I have asked her to asked the nursing staff to let me know if she decides to proceed with surgery and I will work on the timing.       "

## 2025-04-26 NOTE — H&P
Hospital Medicine History & Physical Note    Date of Service  4/26/2025    Primary Care Physician  Krystle Murphy M.D.    Code Status  Full Code    Chief Complaint  Chief Complaint   Patient presents with    Abdominal Pain     Pt reports she needs he gallbladder out but has not been able to get it done, states she last ate approx 1600 & has been having extreme abd pain starting at 1900    N/V     Unable to keep anything down    Chest Pain     Midsternal   Pt also reports bright red blood in vomit       History of Presenting Illness  Damari Hansen is a 29 y.o. female who presented 4/26/2025 with abdominal pain.  PMH of hyperthyroidism does not take meds.  Comes in with right upper quadrant abdominal pain/epigastric pain that began after eating some beef jerky at around 7 PM yesterday.  Denies fever/chills, diarrhea.  She does have nausea and vomiting.  She has had similar pains in the past and was told she had cholelithiasis.    I discussed the plan of care with patient, bedside RN, and dp .    Review of Systems  Review of Systems   Constitutional:  Negative for chills and fever.   Respiratory:  Negative for cough and shortness of breath.    Cardiovascular:  Negative for chest pain.   Gastrointestinal:  Positive for abdominal pain, diarrhea, nausea and vomiting.   Genitourinary:  Negative for dysuria and urgency.       Past Medical History   has a past medical history of Allergy, Asthma, Hyperthyroidism, Migraine, and Patient currently pregnant (6/10/2020).    Surgical History   has a past surgical history that includes tonsillectomy.     Family History  family history includes Cancer in her father, maternal aunt, maternal aunt, and paternal grandfather; Dementia in her maternal grandfather, maternal grandmother, and mother; No Known Problems in her paternal grandmother.   Family history reviewed with patient. There is no family history that is pertinent to the chief complaint.     Social  History   reports that she has never smoked. She has never used smokeless tobacco. She reports current alcohol use. She reports that she does not use drugs.    Allergies  Allergies   Allergen Reactions    Amoxicillin Hives and Rash     Rxn - 2016    Hydrocodone-Acetaminophen      Other reaction(s): hyperactiveness    Propylthiouracil      Other reaction(s): difficulty tolerating       Medications  Prior to Admission Medications   Prescriptions Last Dose Informant Patient Reported? Taking?   Semaglutide (WEGOVY) 0.25 MG/0.5ML Solution Auto-injector Pen-injector  Patient No No   Sig: Inject 0.5 mL under the skin every 7 days.   Patient not taking: Reported on 2024   ibuprofen (MOTRIN) 200 MG Tab  Patient Yes No   Sig: Take 600 mg by mouth every 6 hours as needed. Indications: Pain   levonorgestrel (MIRENA, 52 MG,) 20 MCG/DAY IUD  Patient Yes No   Si Each by Intrauterine route see administration instructions. Change every 5 years, last changed 6 years (2024)   methimazole (TAPAZOLE) 10 MG Tab  Patient No No   Sig: Take 1 Tablet by mouth 2 times a day.   Patient not taking: Reported on 2024   omeprazole (PRILOSEC) 20 MG delayed-release capsule  Patient Yes No   Sig: Take 20 mg by mouth 1 time a day as needed. Indications: Heartburn   omeprazole (PRILOSEC) 20 MG delayed-release capsule   No No   Sig: Take 1 Capsule by mouth every day.   ondansetron (ZOFRAN ODT) 4 MG TABLET DISPERSIBLE   No No   Sig: Take 1 Tablet by mouth every 6 hours as needed for Nausea/Vomiting.      Facility-Administered Medications: None       Physical Exam  Temp:  [36.3 °C (97.4 °F)] 36.3 °C (97.4 °F)  Pulse:  [75-81] 81  Resp:  [18-22] 18  BP: (129-183)/(80-99) 129/80  SpO2:  [97 %-98 %] 97 %  Blood Pressure: 129/80   Temperature: 36.3 °C (97.4 °F)   Pulse: 81   Respiration: 18   Pulse Oximetry: 97 %       Physical Exam  Constitutional:       Appearance: She is obese.   HENT:      Head: Normocephalic and atraumatic.       "Mouth/Throat:      Mouth: Mucous membranes are moist.      Pharynx: No oropharyngeal exudate or posterior oropharyngeal erythema.   Cardiovascular:      Rate and Rhythm: Normal rate and regular rhythm.      Pulses: Normal pulses.      Heart sounds: Normal heart sounds. No murmur heard.  Pulmonary:      Effort: Pulmonary effort is normal. No respiratory distress.      Breath sounds: Normal breath sounds.   Abdominal:      Tenderness: There is abdominal tenderness.   Musculoskeletal:         General: No swelling or tenderness. Normal range of motion.   Skin:     General: Skin is warm and dry.   Neurological:      General: No focal deficit present.      Mental Status: She is alert and oriented to person, place, and time.   Psychiatric:         Mood and Affect: Mood normal.         Laboratory:  Recent Labs     04/26/25  0043   WBC 16.5*   RBC 5.47*   HEMOGLOBIN 15.5   HEMATOCRIT 46.8   MCV 85.6   MCH 28.3   MCHC 33.1   RDW 39.5   PLATELETCT 284   MPV 10.6     Recent Labs     04/26/25  0043   SODIUM 132*   POTASSIUM 4.0   CHLORIDE 100   CO2 20   GLUCOSE 131*   BUN 14   CREATININE 0.95   CALCIUM 8.8     Recent Labs     04/26/25  0043   ALTSGPT 31   ASTSGOT 21   ALKPHOSPHAT 70   TBILIRUBIN 0.7   LIPASE 20   GLUCOSE 131*         No results for input(s): \"NTPROBNP\" in the last 72 hours.      Recent Labs     04/26/25  0043   TROPONINT <6       Imaging:  CT-ABDOMEN-PELVIS WITH   Final Result      1.  No acute abnormality in the abdomen or pelvis.   2.  Cholelithiasis.      US-RUQ   Final Result      1.  Very difficult study due to large body habitus and poor acoustic windows.   2.  Cholelithiasis. No cholecystitis.   3.  Slightly increased hepatic echogenicity, suggestive of mild steatosis and/or hepatocellular disease.        Assessment/Plan:  Justification for Admission Status  I anticipate this patient will require at least two midnights for appropriate medical management, necessitating inpatient admission because abdominal " pain possibly requiring surgical intervention    * Right upper quadrant pain- (present on admission)  Assessment & Plan  Comes in with right upper quadrant abdominal pain after eating some beef jerky.  Ultrasound right upper quadrant showed cholelithiasis and no cholecystitis.  CT of the abdomen reviewed and showed cholelithiasis otherwise no acute abnormalities  Denies fever/chills, she does have nausea and vomiting  EDP discussed with surgery who will evaluate the patient.  She was started on antibiotics    Hyperthyroidism- (present on admission)  Assessment & Plan  Not on any medications currently,, noncompliant intermittently per previous notes  Check thyroid panel and restart meds if needed    Gastroesophageal reflux disease without esophagitis- (present on admission)  Assessment & Plan  Continue PPI    Class 3 severe obesity due to excess calories without serious comorbidity with body mass index (BMI) of 45.0 to 49.9 in adult- (present on admission)  Assessment & Plan  BMI 46.6, outpatient follow-up        VTE prophylaxis: enoxaparin ppx

## 2025-04-26 NOTE — ED NOTES
Called to room-pt requests rn talk to mom on pt phone to ask dr for or today instead of tomorrow after surgical visit . Per dr max-who is no longer in building, case is currently non emergent and will most likely be preformed tomorrow. Mom requests pt be discharged and return in am for or. Rn encourages pt to request same of hospitalist when they round, but reiterated that pt has ivf and meds as well as would probably need to re check in to er again.

## 2025-04-27 ENCOUNTER — SURGERY (OUTPATIENT)
Age: 30
End: 2025-04-27
Payer: COMMERCIAL

## 2025-04-27 ENCOUNTER — ANESTHESIA EVENT (OUTPATIENT)
Dept: SURGERY | Facility: MEDICAL CENTER | Age: 30
End: 2025-04-27
Payer: COMMERCIAL

## 2025-04-27 ENCOUNTER — ANESTHESIA (OUTPATIENT)
Dept: SURGERY | Facility: MEDICAL CENTER | Age: 30
End: 2025-04-27
Payer: COMMERCIAL

## 2025-04-27 LAB
ALBUMIN SERPL BCP-MCNC: 3.5 G/DL (ref 3.2–4.9)
ALBUMIN/GLOB SERPL: 1.3 G/DL
ALP SERPL-CCNC: 65 U/L (ref 30–99)
ALT SERPL-CCNC: 27 U/L (ref 2–50)
ANION GAP SERPL CALC-SCNC: 10 MMOL/L (ref 7–16)
AST SERPL-CCNC: 18 U/L (ref 12–45)
BASOPHILS # BLD AUTO: 0.4 % (ref 0–1.8)
BASOPHILS # BLD: 0.03 K/UL (ref 0–0.12)
BILIRUB SERPL-MCNC: 0.6 MG/DL (ref 0.1–1.5)
BUN SERPL-MCNC: 8 MG/DL (ref 8–22)
CALCIUM ALBUM COR SERPL-MCNC: 8.7 MG/DL (ref 8.5–10.5)
CALCIUM SERPL-MCNC: 8.3 MG/DL (ref 8.5–10.5)
CHLORIDE SERPL-SCNC: 103 MMOL/L (ref 96–112)
CO2 SERPL-SCNC: 24 MMOL/L (ref 20–33)
CREAT SERPL-MCNC: 0.81 MG/DL (ref 0.5–1.4)
EOSINOPHIL # BLD AUTO: 0.2 K/UL (ref 0–0.51)
EOSINOPHIL NFR BLD: 2.5 % (ref 0–6.9)
ERYTHROCYTE [DISTWIDTH] IN BLOOD BY AUTOMATED COUNT: 41.2 FL (ref 35.9–50)
GFR SERPLBLD CREATININE-BSD FMLA CKD-EPI: 99 ML/MIN/1.73 M 2
GLOBULIN SER CALC-MCNC: 2.6 G/DL (ref 1.9–3.5)
GLUCOSE SERPL-MCNC: 84 MG/DL (ref 65–99)
HCG SERPL QL: NEGATIVE
HCT VFR BLD AUTO: 41.3 % (ref 37–47)
HGB BLD-MCNC: 13.3 G/DL (ref 12–16)
IMM GRANULOCYTES # BLD AUTO: 0.03 K/UL (ref 0–0.11)
IMM GRANULOCYTES NFR BLD AUTO: 0.4 % (ref 0–0.9)
LYMPHOCYTES # BLD AUTO: 3.8 K/UL (ref 1–4.8)
LYMPHOCYTES NFR BLD: 47.7 % (ref 22–41)
MCH RBC QN AUTO: 28.2 PG (ref 27–33)
MCHC RBC AUTO-ENTMCNC: 32.2 G/DL (ref 32.2–35.5)
MCV RBC AUTO: 87.7 FL (ref 81.4–97.8)
MONOCYTES # BLD AUTO: 0.57 K/UL (ref 0–0.85)
MONOCYTES NFR BLD AUTO: 7.2 % (ref 0–13.4)
NEUTROPHILS # BLD AUTO: 3.33 K/UL (ref 1.82–7.42)
NEUTROPHILS NFR BLD: 41.8 % (ref 44–72)
NRBC # BLD AUTO: 0 K/UL
NRBC BLD-RTO: 0 /100 WBC (ref 0–0.2)
PLATELET # BLD AUTO: 204 K/UL (ref 164–446)
PMV BLD AUTO: 10.5 FL (ref 9–12.9)
POTASSIUM SERPL-SCNC: 3.8 MMOL/L (ref 3.6–5.5)
PROT SERPL-MCNC: 6.1 G/DL (ref 6–8.2)
RBC # BLD AUTO: 4.71 M/UL (ref 4.2–5.4)
SODIUM SERPL-SCNC: 137 MMOL/L (ref 135–145)
WBC # BLD AUTO: 8 K/UL (ref 4.8–10.8)

## 2025-04-27 PROCEDURE — 160035 HCHG PACU - 1ST 60 MINS PHASE I: Performed by: SURGERY

## 2025-04-27 PROCEDURE — 88304 TISSUE EXAM BY PATHOLOGIST: CPT | Mod: 26 | Performed by: PATHOLOGY

## 2025-04-27 PROCEDURE — 700105 HCHG RX REV CODE 258: Performed by: STUDENT IN AN ORGANIZED HEALTH CARE EDUCATION/TRAINING PROGRAM

## 2025-04-27 PROCEDURE — 160015 HCHG STAT PREOP MINUTES: Performed by: SURGERY

## 2025-04-27 PROCEDURE — 700102 HCHG RX REV CODE 250 W/ 637 OVERRIDE(OP): Performed by: STUDENT IN AN ORGANIZED HEALTH CARE EDUCATION/TRAINING PROGRAM

## 2025-04-27 PROCEDURE — 160002 HCHG RECOVERY MINUTES (STAT): Performed by: SURGERY

## 2025-04-27 PROCEDURE — 700111 HCHG RX REV CODE 636 W/ 250 OVERRIDE (IP): Mod: JZ | Performed by: STUDENT IN AN ORGANIZED HEALTH CARE EDUCATION/TRAINING PROGRAM

## 2025-04-27 PROCEDURE — 160036 HCHG PACU - EA ADDL 30 MINS PHASE I: Performed by: SURGERY

## 2025-04-27 PROCEDURE — 700111 HCHG RX REV CODE 636 W/ 250 OVERRIDE (IP): Performed by: ANESTHESIOLOGY

## 2025-04-27 PROCEDURE — 502714 HCHG ROBOTIC SURGERY SERVICES: Performed by: SURGERY

## 2025-04-27 PROCEDURE — 700104 HCHG RX REV CODE 254: Performed by: SURGERY

## 2025-04-27 PROCEDURE — 160009 HCHG ANES TIME/MIN: Performed by: SURGERY

## 2025-04-27 PROCEDURE — 36415 COLL VENOUS BLD VENIPUNCTURE: CPT

## 2025-04-27 PROCEDURE — 160048 HCHG OR STATISTICAL LEVEL 1-5: Performed by: SURGERY

## 2025-04-27 PROCEDURE — 700111 HCHG RX REV CODE 636 W/ 250 OVERRIDE (IP): Mod: JZ | Performed by: ANESTHESIOLOGY

## 2025-04-27 PROCEDURE — 88304 TISSUE EXAM BY PATHOLOGIST: CPT | Performed by: PATHOLOGY

## 2025-04-27 PROCEDURE — G0378 HOSPITAL OBSERVATION PER HR: HCPCS

## 2025-04-27 PROCEDURE — 160031 HCHG SURGERY MINUTES - 1ST 30 MINS LEVEL 5: Performed by: SURGERY

## 2025-04-27 PROCEDURE — 85025 COMPLETE CBC W/AUTO DIFF WBC: CPT

## 2025-04-27 PROCEDURE — A9270 NON-COVERED ITEM OR SERVICE: HCPCS | Performed by: STUDENT IN AN ORGANIZED HEALTH CARE EDUCATION/TRAINING PROGRAM

## 2025-04-27 PROCEDURE — 700101 HCHG RX REV CODE 250: Performed by: SURGERY

## 2025-04-27 PROCEDURE — 700101 HCHG RX REV CODE 250: Performed by: ANESTHESIOLOGY

## 2025-04-27 PROCEDURE — 99232 SBSQ HOSP IP/OBS MODERATE 35: CPT | Performed by: INTERNAL MEDICINE

## 2025-04-27 PROCEDURE — 94760 N-INVAS EAR/PLS OXIMETRY 1: CPT

## 2025-04-27 PROCEDURE — 160042 HCHG SURGERY MINUTES - EA ADDL 1 MIN LEVEL 5: Performed by: SURGERY

## 2025-04-27 PROCEDURE — 80053 COMPREHEN METABOLIC PANEL: CPT

## 2025-04-27 PROCEDURE — 96375 TX/PRO/DX INJ NEW DRUG ADDON: CPT

## 2025-04-27 PROCEDURE — 84703 CHORIONIC GONADOTROPIN ASSAY: CPT

## 2025-04-27 RX ORDER — HYDROMORPHONE HYDROCHLORIDE 1 MG/ML
0.2 INJECTION, SOLUTION INTRAMUSCULAR; INTRAVENOUS; SUBCUTANEOUS
Status: DISCONTINUED | OUTPATIENT
Start: 2025-04-27 | End: 2025-04-27 | Stop reason: HOSPADM

## 2025-04-27 RX ORDER — ONDANSETRON 2 MG/ML
INJECTION INTRAMUSCULAR; INTRAVENOUS PRN
Status: DISCONTINUED | OUTPATIENT
Start: 2025-04-27 | End: 2025-04-27 | Stop reason: SURG

## 2025-04-27 RX ORDER — OXYCODONE HCL 5 MG/5 ML
5 SOLUTION, ORAL ORAL
Status: DISCONTINUED | OUTPATIENT
Start: 2025-04-27 | End: 2025-04-27 | Stop reason: HOSPADM

## 2025-04-27 RX ORDER — INDOCYANINE GREEN AND WATER 25 MG
2.5 KIT INJECTION
Status: COMPLETED | OUTPATIENT
Start: 2025-04-27 | End: 2025-04-27

## 2025-04-27 RX ORDER — CEFAZOLIN SODIUM 1 G/3ML
INJECTION, POWDER, FOR SOLUTION INTRAMUSCULAR; INTRAVENOUS PRN
Status: DISCONTINUED | OUTPATIENT
Start: 2025-04-27 | End: 2025-04-27 | Stop reason: SURG

## 2025-04-27 RX ORDER — ONDANSETRON 2 MG/ML
4 INJECTION INTRAMUSCULAR; INTRAVENOUS
Status: COMPLETED | OUTPATIENT
Start: 2025-04-27 | End: 2025-04-27

## 2025-04-27 RX ORDER — BUPIVACAINE HYDROCHLORIDE AND EPINEPHRINE 5; 5 MG/ML; UG/ML
INJECTION, SOLUTION EPIDURAL; INTRACAUDAL; PERINEURAL
Status: DISCONTINUED | OUTPATIENT
Start: 2025-04-27 | End: 2025-04-27 | Stop reason: HOSPADM

## 2025-04-27 RX ORDER — DEXAMETHASONE SODIUM PHOSPHATE 4 MG/ML
INJECTION, SOLUTION INTRA-ARTICULAR; INTRALESIONAL; INTRAMUSCULAR; INTRAVENOUS; SOFT TISSUE PRN
Status: DISCONTINUED | OUTPATIENT
Start: 2025-04-27 | End: 2025-04-27 | Stop reason: SURG

## 2025-04-27 RX ORDER — KETOROLAC TROMETHAMINE 15 MG/ML
INJECTION, SOLUTION INTRAMUSCULAR; INTRAVENOUS PRN
Status: DISCONTINUED | OUTPATIENT
Start: 2025-04-27 | End: 2025-04-27 | Stop reason: SURG

## 2025-04-27 RX ORDER — HYDROMORPHONE HYDROCHLORIDE 1 MG/ML
0.1 INJECTION, SOLUTION INTRAMUSCULAR; INTRAVENOUS; SUBCUTANEOUS
Status: DISCONTINUED | OUTPATIENT
Start: 2025-04-27 | End: 2025-04-27 | Stop reason: HOSPADM

## 2025-04-27 RX ORDER — HYDROMORPHONE HYDROCHLORIDE 2 MG/ML
INJECTION, SOLUTION INTRAMUSCULAR; INTRAVENOUS; SUBCUTANEOUS PRN
Status: DISCONTINUED | OUTPATIENT
Start: 2025-04-27 | End: 2025-04-27 | Stop reason: SURG

## 2025-04-27 RX ORDER — OXYCODONE HCL 5 MG/5 ML
10 SOLUTION, ORAL ORAL
Status: DISCONTINUED | OUTPATIENT
Start: 2025-04-27 | End: 2025-04-27 | Stop reason: HOSPADM

## 2025-04-27 RX ORDER — ROCURONIUM BROMIDE 10 MG/ML
INJECTION, SOLUTION INTRAVENOUS PRN
Status: DISCONTINUED | OUTPATIENT
Start: 2025-04-27 | End: 2025-04-27 | Stop reason: SURG

## 2025-04-27 RX ORDER — HALOPERIDOL 5 MG/ML
1 INJECTION INTRAMUSCULAR
Status: DISCONTINUED | OUTPATIENT
Start: 2025-04-27 | End: 2025-04-27 | Stop reason: HOSPADM

## 2025-04-27 RX ORDER — MIDAZOLAM HYDROCHLORIDE 1 MG/ML
INJECTION INTRAMUSCULAR; INTRAVENOUS PRN
Status: DISCONTINUED | OUTPATIENT
Start: 2025-04-27 | End: 2025-04-27 | Stop reason: SURG

## 2025-04-27 RX ORDER — LIDOCAINE HYDROCHLORIDE 40 MG/ML
SOLUTION TOPICAL PRN
Status: DISCONTINUED | OUTPATIENT
Start: 2025-04-27 | End: 2025-04-27 | Stop reason: SURG

## 2025-04-27 RX ORDER — DIPHENHYDRAMINE HYDROCHLORIDE 50 MG/ML
12.5 INJECTION, SOLUTION INTRAMUSCULAR; INTRAVENOUS
Status: DISCONTINUED | OUTPATIENT
Start: 2025-04-27 | End: 2025-04-27 | Stop reason: HOSPADM

## 2025-04-27 RX ORDER — HYDROMORPHONE HYDROCHLORIDE 1 MG/ML
0.4 INJECTION, SOLUTION INTRAMUSCULAR; INTRAVENOUS; SUBCUTANEOUS
Status: DISCONTINUED | OUTPATIENT
Start: 2025-04-27 | End: 2025-04-27 | Stop reason: HOSPADM

## 2025-04-27 RX ADMIN — SODIUM CHLORIDE, POTASSIUM CHLORIDE, SODIUM LACTATE AND CALCIUM CHLORIDE: 600; 310; 30; 20 INJECTION, SOLUTION INTRAVENOUS at 13:50

## 2025-04-27 RX ADMIN — ONDANSETRON 4 MG: 2 INJECTION INTRAMUSCULAR; INTRAVENOUS at 13:19

## 2025-04-27 RX ADMIN — HYDROMORPHONE HYDROCHLORIDE 1 MG: 2 INJECTION INTRAMUSCULAR; INTRAVENOUS; SUBCUTANEOUS at 12:40

## 2025-04-27 RX ADMIN — ONDANSETRON 4 MG: 2 INJECTION INTRAMUSCULAR; INTRAVENOUS at 13:50

## 2025-04-27 RX ADMIN — HYDROMORPHONE HYDROCHLORIDE 0.5 MG: 2 INJECTION INTRAMUSCULAR; INTRAVENOUS; SUBCUTANEOUS at 13:01

## 2025-04-27 RX ADMIN — MORPHINE SULFATE 4 MG: 4 INJECTION, SOLUTION INTRAMUSCULAR; INTRAVENOUS at 08:33

## 2025-04-27 RX ADMIN — BUPIVACAINE HYDROCHLORIDE AND EPINEPHRINE BITARTRATE 30 ML: 5; .0091 INJECTION, SOLUTION EPIDURAL; INTRACAUDAL; PERINEURAL at 13:02

## 2025-04-27 RX ADMIN — INDOCYANINE GREEN AND WATER 2.5 MG: KIT at 12:17

## 2025-04-27 RX ADMIN — OXYCODONE HYDROCHLORIDE 10 MG: 10 TABLET ORAL at 19:33

## 2025-04-27 RX ADMIN — CEFAZOLIN 3 G: 1 INJECTION, POWDER, FOR SOLUTION INTRAMUSCULAR; INTRAVENOUS at 12:48

## 2025-04-27 RX ADMIN — FENTANYL CITRATE 25 MCG: 50 INJECTION, SOLUTION INTRAMUSCULAR; INTRAVENOUS at 14:01

## 2025-04-27 RX ADMIN — SODIUM CHLORIDE, POTASSIUM CHLORIDE, SODIUM LACTATE AND CALCIUM CHLORIDE: 600; 310; 30; 20 INJECTION, SOLUTION INTRAVENOUS at 12:28

## 2025-04-27 RX ADMIN — SODIUM CHLORIDE, POTASSIUM CHLORIDE, SODIUM LACTATE AND CALCIUM CHLORIDE: 600; 310; 30; 20 INJECTION, SOLUTION INTRAVENOUS at 12:35

## 2025-04-27 RX ADMIN — PROPOFOL 150 MG: 10 INJECTION, EMULSION INTRAVENOUS at 12:40

## 2025-04-27 RX ADMIN — MIDAZOLAM HYDROCHLORIDE 2 MG: 1 INJECTION, SOLUTION INTRAMUSCULAR; INTRAVENOUS at 12:40

## 2025-04-27 RX ADMIN — LIDOCAINE HYDROCHLORIDE 4 ML: 40 SOLUTION TOPICAL at 12:41

## 2025-04-27 RX ADMIN — DEXAMETHASONE SODIUM PHOSPHATE 6 MG: 4 INJECTION INTRA-ARTICULAR; INTRALESIONAL; INTRAMUSCULAR; INTRAVENOUS; SOFT TISSUE at 12:43

## 2025-04-27 RX ADMIN — OXYCODONE 5 MG: 5 TABLET ORAL at 15:40

## 2025-04-27 RX ADMIN — SODIUM CHLORIDE, POTASSIUM CHLORIDE, SODIUM LACTATE AND CALCIUM CHLORIDE: 600; 310; 30; 20 INJECTION, SOLUTION INTRAVENOUS at 03:19

## 2025-04-27 RX ADMIN — PROPOFOL 50 MG: 10 INJECTION, EMULSION INTRAVENOUS at 13:20

## 2025-04-27 RX ADMIN — FENTANYL CITRATE 25 MCG: 50 INJECTION, SOLUTION INTRAMUSCULAR; INTRAVENOUS at 13:58

## 2025-04-27 RX ADMIN — ROCURONIUM BROMIDE 70 MG: 10 INJECTION INTRAVENOUS at 12:40

## 2025-04-27 RX ADMIN — KETOROLAC TROMETHAMINE 15 MG: 15 INJECTION, SOLUTION INTRAMUSCULAR; INTRAVENOUS at 13:19

## 2025-04-27 ASSESSMENT — ENCOUNTER SYMPTOMS
EYES NEGATIVE: 1
RESPIRATORY NEGATIVE: 1
MUSCULOSKELETAL NEGATIVE: 1
ROS GI COMMENTS: RIGHT-SIDED ABDOMINAL PAIN
ABDOMINAL PAIN: 1
CARDIOVASCULAR NEGATIVE: 1
NEUROLOGICAL NEGATIVE: 1
PSYCHIATRIC NEGATIVE: 1

## 2025-04-27 ASSESSMENT — PAIN DESCRIPTION - PAIN TYPE
TYPE: SURGICAL PAIN
TYPE: ACUTE PAIN
TYPE: SURGICAL PAIN
TYPE: SURGICAL PAIN
TYPE: SURGICAL PAIN;ACUTE PAIN
TYPE: ACUTE PAIN
TYPE: SURGICAL PAIN

## 2025-04-27 ASSESSMENT — COGNITIVE AND FUNCTIONAL STATUS - GENERAL
SUGGESTED CMS G CODE MODIFIER DAILY ACTIVITY: CH
SUGGESTED CMS G CODE MODIFIER MOBILITY: CH
DAILY ACTIVITIY SCORE: 24
MOBILITY SCORE: 24

## 2025-04-27 ASSESSMENT — FIBROSIS 4 INDEX: FIB4 SCORE: 0.53

## 2025-04-27 ASSESSMENT — PAIN SCALES - GENERAL: PAIN_LEVEL: 2

## 2025-04-27 NOTE — PROGRESS NOTES
Bedside report received from day RNTalia and assumed care of patient at change of shift. Chart, labs, and orders reviewed. Pt resting in bed, breathing even and unlabored, denies pain and in no acute distress. A&Ox4 on RA. LR running at 100 mL/hr. Fall precautions  in place and education provided. Call light within reach, bed locked and in lowest position, denies other needs at this time. Hourly rounding in progress.      I have reviewed and confirmed nurses' notes...

## 2025-04-27 NOTE — ANESTHESIA TIME REPORT
Anesthesia Start and Stop Event Times       Date Time Event    4/27/2025 1159 Ready for Procedure     1235 Anesthesia Start     1341 Anesthesia Stop          Responsible Staff  04/27/25      Name Role Begin End    Zion Mulligan M.D. Anesth 1235 1341          Overtime Reason:  no overtime (within assigned shift)    Comments:

## 2025-04-27 NOTE — OR NURSING
1336: Patient arrived to PACU from OR via gurney. Report received from anesthesia and RN. Cold pack applied.     1350: Report given to ROSA MARIA Fermin.

## 2025-04-27 NOTE — PROGRESS NOTES
Report received from PACU nurse, pt came in via hospital bed .   Pt alert and oriented, on 5 L oxymask.   Assessment done. Pain scale of 6/10, medicated per MAR.  Dressing dry and intact, kept monitored.  Fall precautions in place. Call light within reach.  Vs taken and monitored.   No further need at this time.

## 2025-04-27 NOTE — OP REPORT
Surgeon: Russ Fleming MD   Preoperative diagnosis: Acute cholecystitis   Postop diagnosis: Acute cholecystitis   Procedure: Robotic assisted laparoscopic cholecystectomy   Anesthesia: General endotracheal anesthesia   Anesthesiologist: Zion Mulligan MD  Indications: 29-year-old woman with symptomatic gallstones and cholecystitis.  Narrative: The procedure was discussed in detail with the patient including the robotic assisted laparoscopic approach, the potential for converting to an open procedure, and the associated risk of bleeding, infection, abscess, and hematoma. Also discussed the risk of postoperative bile leak, common bile duct stricture, common bile duct transection, and the significance of these complications. The patient understood all of the above and wished to proceed. The patient was placed under anesthesia by Dr. Mulligan. Patient's abdomen was prepped with chlorhexidine prep and sterile drapes. After a time out an supraumbilical incision to the left of the midline was made.  Through this incision a Veress needle was placed.  Low pressure was confirmed and a pneumoperitoneum was achieved with co2 insufflation to a pressure of 15 mmhg mercury.  Under direct visualization 1 lateral left-sided and 2 right-sided da Hernandez ports were placed.  The robot was docked and instruments were placed.  The gallbladder was identified and retracted superiorly and laterally. multiple adhesions were noted and these were carefully taken down. the base of the gallbladder was carefully dissected. the cystic duct was identified and could be seen to clearly exit the gallbladder and retract laterally along the gallbladder. In  a similar fashion the cystic artery was identified and dissected away from surrounding connective tissue. a critical view was obtained. subsequent to this 2 clips were placed proximally on the duct and 1 distally and it was divided with scissors. Similarly, the cystic artery was clipped twice proximally  and one distally and divided sharply with scissors. The gallbladder was then dissected off the liver bed and placed in a bag retrieval device. Hemostasis was assured with cautery. There was no evidence of bleeding or bile leak.  The gallbladder was removed through a port site.  Ports removed and the pneumoperitoneum was released.  The port that had been enlarged for removal of the gallbladder had its fascia approximated with an 0 Vicryl stitch using the Endo Close device.  The subcutaneous tissue was irrigated and the skin on all incisions was approximatedwith 4.0 vicryl  stitches. Dermabond was placed. The patient tolerated procedure well without apparent complication. Final counts were reported as correct.  Wound class was class II.

## 2025-04-27 NOTE — CARE PLAN
The patient is Stable - Low risk of patient condition declining or worsening    Shift Goals  Clinical Goals: Monitor for pain, NPO, remain free from falls  Patient Goals: surgery in am, sleep  Family Goals: N/A    Progress made toward(s) clinical / shift goals:    Pain medication available per MAR, otherwise no complaints of pain this shift. Patient has been strict NPO since midnight. Remains free from falls and calls appropriately and ambulates with steady gait self.     Patient is not progressing towards the following goals:

## 2025-04-27 NOTE — PROGRESS NOTES
Discussed proposed procedure including the risk of bleeding, infection, abscess, and hematoma.  We also discussed risk of a postoperative bile leak,, other stricture,, duct transection, and the significance of these complications.  She understands all the above and does wish to proceed.
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
## 2025-04-27 NOTE — PROGRESS NOTES
4 Eyes Skin Assessment Completed by ***, RN and ***, RN.    Head {Head:47427}  Ears {Ears:07454}  Nose {Nose:72887}  Mouth {Mouth:07026}  Neck {Neck:56970}  Breast/Chest {Breast/Chest:59090}  Shoulder Blades {Shoulder Blades:98231}  Spine {Spine:89895}  (R) Arm/Elbow/Hand {(R) Arm/Elbow/Hand:79098}  (L) Arm/Elbow/Hand {(L) Arm/Elbow/Hand:54765}  Abdomen {Abdomen:92877}  Groin {Groin:73828}  Scrotum/Coccyx/Buttocks {Scrotum/Coccyx/Buttocks:12833}  (R) Leg {(R) Le}  (L) Leg {(L) Le}  (R) Heel/Foot/Toe {(R) Heel/Foot/Toe:85855}  (L) Heel/Foot/Toe {(L) Heel/Foot/Toe:85694}          Devices In Places {Devices In Place:60270}      Interventions In Place {Interventions In Place:04948}    Possible Skin Injury {Possible Skin Injury:87941}    Pictures Uploaded Into Epic {Pictures Uploaded Into Epic:13498}  Wound Consult Placed {Wound Consult Placed:}  RN Wound Prevention Protocol Ordered {YES/NO:}

## 2025-04-27 NOTE — OR NURSING
Procedure, patient allergies, and NPO status verified. Home med rec reviewed with pt--medications, dosages, and last time taken correct per pt. Belongings secured. Patient verbalizes understanding of pain scale, expected course of stay, and plan of care. Surgical site verified with pt, IV access established, and SCD placed on BL legs.

## 2025-04-27 NOTE — ANESTHESIA PREPROCEDURE EVALUATION
Case: [id number] Date/Time: 04/27/25 1240    Procedure: CHOLECYSTECTOMY, ROBOT-ASSISTED, LAPAROSCOPIC, WITH INTRAOPERATIVE CHOLANGIOGRAM    Location:  OR 01 / SURGERY [facility]    Surgeons: Russ Fleming M.D.            Relevant Problems   PULMONARY   (positive) Asthma   (positive) Mild persistent asthma      GI   (positive) Gastroesophageal reflux disease without esophagitis      ENDO   (positive) Hyperthyroidism       Physical Exam    Airway   Mallampati: II  TM distance: >3 FB  Neck ROM: full       Cardiovascular - normal exam  Rhythm: regular  Rate: normal  (-) murmur     Dental - normal exam           Pulmonary - normal exam  Breath sounds clear to auscultation     Abdominal   (+) obese     Neurological - normal exam                   Anesthesia Plan    ASA 3   ASA physical status 3 criteria: morbid obesity - BMI greater than or equal to 40    Plan - general       Airway plan will be ETT          Induction: intravenous    Postoperative Plan: Postoperative administration of opioids is intended.    Pertinent diagnostic labs and testing reviewed    Informed Consent:    Anesthetic plan and risks discussed with patient.    Use of blood products discussed with: patient whom consented to blood products.

## 2025-04-27 NOTE — ANESTHESIA PROCEDURE NOTES
Airway    Date/Time: 4/27/2025 12:41 PM    Performed by: Zion Mulligan M.D.  Authorized by: Zion Mulligan M.D.    Location:  OR  Urgency:  Elective  Indications for Airway Management:  Anesthesia      Spontaneous Ventilation: absent    Sedation Level:  Deep  Preoxygenated: Yes    Patient Position:  Sniffing  Final Airway Type:  Endotracheal airway  Final Endotracheal Airway:  ETT  Cuffed: Yes    Technique Used for Successful ETT Placement:  Direct laryngoscopy    Insertion Site:  Oral  Blade Type:  Augie  Laryngoscope Blade/Videolaryngoscope Blade Size:  3  ETT Size (mm):  7.0  Measured from:  Teeth  ETT to Teeth (cm):  22  Placement Verified by: auscultation and capnometry    Cormack-Lehane Classification:  Grade I - full view of glottis  Number of Attempts at Approach:  1

## 2025-04-27 NOTE — PROGRESS NOTES
Bedside report received from night shift RN. Assumed care of patient. Pt resting comfortably in bed with no signs of distress noted. Breathing even and unlabored. Call light within reach. Bed locked in lowest position. No further needs at this time.

## 2025-04-27 NOTE — PROGRESS NOTES
4 Eyes Skin Assessment Completed by ***, RN and ***, RN.    Head {Head:13854}  Ears {Ears:39033}  Nose {Nose:12947}  Mouth {Mouth:14715}  Neck {Neck:56440}  Breast/Chest {Breast/Chest:13269}  Shoulder Blades {Shoulder Blades:83480}  Spine {Spine:55466}  (R) Arm/Elbow/Hand {(R) Arm/Elbow/Hand:90629}  (L) Arm/Elbow/Hand {(L) Arm/Elbow/Hand:41673}  Abdomen {Abdomen:09420}  Groin {Groin:17089}  Scrotum/Coccyx/Buttocks {Scrotum/Coccyx/Buttocks:15397}  (R) Leg {(R) Le}  (L) Leg {(L) Le}  (R) Heel/Foot/Toe {(R) Heel/Foot/Toe:26453}  (L) Heel/Foot/Toe {(L) Heel/Foot/Toe:70953}          Devices In Places {Devices In Place:38358}      Interventions In Place {Interventions In Place:91589}    Possible Skin Injury {Possible Skin Injury:07352}    Pictures Uploaded Into Epic {Pictures Uploaded Into Epic:36684}  Wound Consult Placed {Wound Consult Placed:}  RN Wound Prevention Protocol Ordered {YES/NO:}

## 2025-04-27 NOTE — ANESTHESIA POSTPROCEDURE EVALUATION
Patient: Damari Hansen    Procedure Summary       Date: 04/27/25 Room / Location:  OR  / SURGERY [facility]    Anesthesia Start: 1235 Anesthesia Stop: 1341    Procedure: CHOLECYSTECTOMY, ROBOT-ASSISTED, LAPAROSCOPIC, WITH INTRAOPERATIVE CHOLANGIOGRAM (Abdomen) Diagnosis:     Surgeons: Russ Fleming M.D. Responsible Provider: Zion Mulligan M.D.    Anesthesia Type: general ASA Status: 3            Final Anesthesia Type: general  Last vitals  BP   Blood Pressure: 95/62    Temp   36.4 °C (97.5 °F)    Pulse   68   Resp   18    SpO2   95 %      Anesthesia Post Evaluation    Patient location during evaluation: PACU  Patient participation: complete - patient participated  Level of consciousness: awake and alert  Pain score: 2    Airway patency: patent  Anesthetic complications: no  Cardiovascular status: hemodynamically stable  Respiratory status: acceptable  Hydration status: euvolemic    PONV: none          There were no known notable events for this encounter.     Nurse Pain Score: 0 (NPRS)

## 2025-04-27 NOTE — CARE PLAN
The patient is Stable - Low risk of patient condition declining or worsening    Shift Goals  Clinical Goals: Surgery today, pain control, remain NPO until surgery  Patient Goals: Pain control, surgery, go home  Family Goals: N/A    Progress made toward(s) clinical / shift goals:  Pain controlled with PRN medications, no falls or injuries, surgery completed this shift.     Patient is not progressing towards the following goals:

## 2025-04-27 NOTE — PROGRESS NOTES
Hospital Medicine Daily Progress Note    Date of Service  4/27/2025    Chief Complaint  Damari Hansen is a 29 y.o. female admitted 4/26/2025 with right upper quadrant/gastric abdominal pain, nausea, and vomiting which started after eating beef jerky the previous evening.  She is morbidly obese, and has a history of hyperthyroidism and GERD.    Hospital Course  On admission, patient was afebrile.  WBCs were 16.5, sodium was 132, LFTs were unremarkable. Ultrasound showed cholelithiasis with no evidence of cholecystitis.  CT abdomen and pelvis showed cholelithiasis with no other acute abnormality in the abdomen or pelvis.  General surgery were consulted.    Interval Problem Update  WBCs have normalized. Planned for cholecystectomy today.     I have discussed this patient's plan of care and discharge plan at IDT rounds today with Case Management, Nursing, Nursing leadership, and other members of the IDT team.    Consultants/Specialty  general surgery    Code Status  Full Code    Disposition  The patient is not medically cleared for discharge to home or a post-acute facility.  Anticipate discharge to: home with close outpatient follow-up    I have placed the appropriate orders for post-discharge needs.    Review of Systems  Review of Systems   Constitutional:  Positive for malaise/fatigue.   HENT: Negative.     Eyes: Negative.    Respiratory: Negative.     Cardiovascular: Negative.    Gastrointestinal:  Positive for abdominal pain.        Right-sided abdominal pain   Genitourinary: Negative.    Musculoskeletal: Negative.    Skin: Negative.    Neurological: Negative.    Endo/Heme/Allergies: Negative.    Psychiatric/Behavioral: Negative.          Physical Exam  Temp:  [36.2 °C (97.1 °F)-36.8 °C (98.3 °F)] 36.2 °C (97.1 °F)  Pulse:  [68-78] 69  Resp:  [18-20] 18  BP: (101-134)/(51-79) 102/51  SpO2:  [93 %-96 %] 95 %    Physical Exam  Constitutional:       Appearance: She is obese.   HENT:      Head: Normocephalic.       Nose: Nose normal.      Mouth/Throat:      Mouth: Mucous membranes are moist.   Eyes:      Pupils: Pupils are equal, round, and reactive to light.   Cardiovascular:      Rate and Rhythm: Normal rate.   Pulmonary:      Effort: Pulmonary effort is normal.   Abdominal:      Palpations: Abdomen is soft.      Tenderness: There is abdominal tenderness.      Comments: Tender in right mid abdomen   Musculoskeletal:      Cervical back: Normal range of motion.      Right lower leg: No edema.      Left lower leg: No edema.   Skin:     General: Skin is warm.   Neurological:      General: No focal deficit present.      Mental Status: She is alert.   Psychiatric:         Mood and Affect: Mood normal.         Fluids    Intake/Output Summary (Last 24 hours) at 4/27/2025 1108  Last data filed at 4/27/2025 1055  Gross per 24 hour   Intake 2547.36 ml   Output 1700 ml   Net 847.36 ml        Laboratory  Recent Labs     04/26/25  0043 04/27/25  0207   WBC 16.5* 8.0   RBC 5.47* 4.71   HEMOGLOBIN 15.5 13.3   HEMATOCRIT 46.8 41.3   MCV 85.6 87.7   MCH 28.3 28.2   MCHC 33.1 32.2   RDW 39.5 41.2   PLATELETCT 284 204   MPV 10.6 10.5     Recent Labs     04/26/25  0043 04/27/25  0207   SODIUM 132* 137   POTASSIUM 4.0 3.8   CHLORIDE 100 103   CO2 20 24   GLUCOSE 131* 84   BUN 14 8   CREATININE 0.95 0.81   CALCIUM 8.8 8.3*                   Imaging  CT-ABDOMEN-PELVIS WITH   Final Result      1.  No acute abnormality in the abdomen or pelvis.   2.  Cholelithiasis.      US-RUQ   Final Result      1.  Very difficult study due to large body habitus and poor acoustic windows.   2.  Cholelithiasis. No cholecystitis.   3.  Slightly increased hepatic echogenicity, suggestive of mild steatosis and/or hepatocellular disease.           Assessment/Plan  * Right upper quadrant pain- (present on admission)  Assessment & Plan  Right upper quadrant and epigastric pain after eating beef jerky.  Known history of cholelithiasis. CT abdomen and pelvis showed  cholelithiasis with no other acute abnormalities. LFTs were unremarkable. WBCs were elevated on admission. Patient was evaluated by general surgery. Patient has been planned for cholecystectomy today.     Gastroesophageal reflux disease without esophagitis- (present on admission)  Assessment & Plan  Continue omeprazole.    Class 3 severe obesity due to excess calories without serious comorbidity with body mass index (BMI) of 45.0 to 49.9 in adult- (present on admission)  Assessment & Plan  Morbidly obese with BMI of 46.62 kg/m².  Recommend outpatient follow-up for weight loss and management    Hyperthyroidism- (present on admission)  Assessment & Plan  History of hyperthyroidism. TSH from 4/26/2025 was 0.438, free T4 is 1.42. Continue outpatient follow-up         VTE prophylaxis: SCDs

## 2025-04-28 ENCOUNTER — PHARMACY VISIT (OUTPATIENT)
Dept: PHARMACY | Facility: MEDICAL CENTER | Age: 30
End: 2025-04-28
Payer: COMMERCIAL

## 2025-04-28 VITALS
OXYGEN SATURATION: 93 % | RESPIRATION RATE: 17 BRPM | HEART RATE: 85 BPM | DIASTOLIC BLOOD PRESSURE: 72 MMHG | WEIGHT: 293 LBS | HEIGHT: 69 IN | BODY MASS INDEX: 43.4 KG/M2 | SYSTOLIC BLOOD PRESSURE: 124 MMHG | TEMPERATURE: 98.2 F

## 2025-04-28 PROBLEM — R10.11 RIGHT UPPER QUADRANT PAIN: Status: RESOLVED | Noted: 2025-04-26 | Resolved: 2025-04-28

## 2025-04-28 LAB
ALBUMIN SERPL BCP-MCNC: 3.6 G/DL (ref 3.2–4.9)
ALBUMIN/GLOB SERPL: 1.3 G/DL
ALP SERPL-CCNC: 64 U/L (ref 30–99)
ALT SERPL-CCNC: 42 U/L (ref 2–50)
ANION GAP SERPL CALC-SCNC: 12 MMOL/L (ref 7–16)
AST SERPL-CCNC: 37 U/L (ref 12–45)
BILIRUB SERPL-MCNC: 0.5 MG/DL (ref 0.1–1.5)
BUN SERPL-MCNC: 7 MG/DL (ref 8–22)
CALCIUM ALBUM COR SERPL-MCNC: 8.8 MG/DL (ref 8.5–10.5)
CALCIUM SERPL-MCNC: 8.5 MG/DL (ref 8.5–10.5)
CHLORIDE SERPL-SCNC: 102 MMOL/L (ref 96–112)
CO2 SERPL-SCNC: 22 MMOL/L (ref 20–33)
CREAT SERPL-MCNC: 0.78 MG/DL (ref 0.5–1.4)
ERYTHROCYTE [DISTWIDTH] IN BLOOD BY AUTOMATED COUNT: 39.6 FL (ref 35.9–50)
GFR SERPLBLD CREATININE-BSD FMLA CKD-EPI: 104 ML/MIN/1.73 M 2
GLOBULIN SER CALC-MCNC: 2.8 G/DL (ref 1.9–3.5)
GLUCOSE SERPL-MCNC: 113 MG/DL (ref 65–99)
HCT VFR BLD AUTO: 42.8 % (ref 37–47)
HGB BLD-MCNC: 13.9 G/DL (ref 12–16)
MCH RBC QN AUTO: 28.3 PG (ref 27–33)
MCHC RBC AUTO-ENTMCNC: 32.5 G/DL (ref 32.2–35.5)
MCV RBC AUTO: 87.2 FL (ref 81.4–97.8)
PLATELET # BLD AUTO: 256 K/UL (ref 164–446)
PMV BLD AUTO: 10.8 FL (ref 9–12.9)
POTASSIUM SERPL-SCNC: 4.5 MMOL/L (ref 3.6–5.5)
PROT SERPL-MCNC: 6.4 G/DL (ref 6–8.2)
RBC # BLD AUTO: 4.91 M/UL (ref 4.2–5.4)
SODIUM SERPL-SCNC: 136 MMOL/L (ref 135–145)
WBC # BLD AUTO: 15.4 K/UL (ref 4.8–10.8)

## 2025-04-28 PROCEDURE — 85027 COMPLETE CBC AUTOMATED: CPT

## 2025-04-28 PROCEDURE — 80053 COMPREHEN METABOLIC PANEL: CPT

## 2025-04-28 PROCEDURE — 99239 HOSP IP/OBS DSCHRG MGMT >30: CPT | Performed by: INTERNAL MEDICINE

## 2025-04-28 PROCEDURE — 36415 COLL VENOUS BLD VENIPUNCTURE: CPT

## 2025-04-28 PROCEDURE — A9270 NON-COVERED ITEM OR SERVICE: HCPCS | Performed by: STUDENT IN AN ORGANIZED HEALTH CARE EDUCATION/TRAINING PROGRAM

## 2025-04-28 PROCEDURE — G0378 HOSPITAL OBSERVATION PER HR: HCPCS

## 2025-04-28 PROCEDURE — RXMED WILLOW AMBULATORY MEDICATION CHARGE: Performed by: INTERNAL MEDICINE

## 2025-04-28 PROCEDURE — 700105 HCHG RX REV CODE 258: Performed by: STUDENT IN AN ORGANIZED HEALTH CARE EDUCATION/TRAINING PROGRAM

## 2025-04-28 PROCEDURE — 700102 HCHG RX REV CODE 250 W/ 637 OVERRIDE(OP): Performed by: STUDENT IN AN ORGANIZED HEALTH CARE EDUCATION/TRAINING PROGRAM

## 2025-04-28 PROCEDURE — 94760 N-INVAS EAR/PLS OXIMETRY 1: CPT

## 2025-04-28 RX ORDER — OMEPRAZOLE 20 MG/1
20 CAPSULE, DELAYED RELEASE ORAL DAILY
Qty: 60 CAPSULE | Refills: 1 | Status: SHIPPED | OUTPATIENT
Start: 2025-04-28

## 2025-04-28 RX ADMIN — SODIUM CHLORIDE, POTASSIUM CHLORIDE, SODIUM LACTATE AND CALCIUM CHLORIDE: 600; 310; 30; 20 INJECTION, SOLUTION INTRAVENOUS at 06:31

## 2025-04-28 RX ADMIN — OMEPRAZOLE 20 MG: 20 CAPSULE, DELAYED RELEASE ORAL at 05:08

## 2025-04-28 RX ADMIN — OXYCODONE HYDROCHLORIDE 10 MG: 10 TABLET ORAL at 06:33

## 2025-04-28 RX ADMIN — OXYCODONE 5 MG: 5 TABLET ORAL at 01:34

## 2025-04-28 ASSESSMENT — PAIN DESCRIPTION - PAIN TYPE
TYPE: SURGICAL PAIN

## 2025-04-28 NOTE — DISCHARGE SUMMARY
Discharge Summary    CHIEF COMPLAINT ON ADMISSION  Chief Complaint   Patient presents with    Abdominal Pain     Pt reports she needs he gallbladder out but has not been able to get it done, states she last ate approx 1600 & has been having extreme abd pain starting at 1900    N/V     Unable to keep anything down    Chest Pain     Midsternal   Pt also reports bright red blood in vomit       Reason for Admission  Chest Pain:Blood In Vomit     Admission Date  4/26/2025    CODE STATUS  Full Code    HPI & HOSPITAL COURSE  Damari Hansen is a 29 y.o. female admitted 4/26/2025 with right upper quadrant/gastric abdominal pain, nausea, and vomiting which started after eating beef jerky the previous evening.  She is morbidly obese, and has a history of hyperthyroidism and GERD.     On admission, patient was afebrile.  WBCs were 16.5, sodium was 132, LFTs were unremarkable. Ultrasound showed cholelithiasis with no evidence of cholecystitis.  CT abdomen and pelvis showed cholelithiasis with no other acute abnormality in the abdomen or pelvis.  General surgery were consulted, patient underwent laparoscopic cholecystectomy on 4/27/2025.    Patient feels well this morning.  She is afebrile and hemodynamically stable.  LFTs are normal.  Has been advised to follow-up with her primary care physician within 2 to 3 days, and general surgery within 1 to 2 weeks.         Therefore, she is discharged in good and stable condition to home with close outpatient follow-up.      Discharge Date  4/28/2025    FOLLOW UP ITEMS POST DISCHARGE  PCP and General Surgery    DISCHARGE DIAGNOSES  Principal Problem (Resolved):    Right upper quadrant pain (POA: Yes)  Active Problems:    Hyperthyroidism (POA: Yes)    Class 3 severe obesity due to excess calories without serious comorbidity with body mass index (BMI) of 45.0 to 49.9 in adult (POA: Yes)    Gastroesophageal reflux disease without esophagitis (POA: Yes)      FOLLOW UP  No future  appointments.  No follow-up provider specified.    MEDICATIONS ON DISCHARGE     Medication List        CONTINUE taking these medications        Instructions   ASHWAGANDHA PO   Take 1 Tablet by mouth every morning.  Dose: 1 Tablet     ibuprofen 200 MG Tabs  Commonly known as: Motrin   Take 400 mg by mouth every 6 hours as needed for Mild Pain. Indications: Pain  Dose: 400 mg     loratadine 10 MG Tabs  Commonly known as: Claritin   Take 10 mg by mouth every morning.  Dose: 10 mg     Mirena (52 MG) 20 MCG/DAY IUD  Generic drug: levonorgestrel   1 Each by Intrauterine route see administration instructions. Change every 5 years, last changed 6 years (9/4/2024)  Dose: 1 Each     MULTIVITAMIN PO   Take 1 Tablet by mouth every morning.  Dose: 1 Tablet     omeprazole 20 MG delayed-release capsule  Commonly known as: PriLOSEC   Take 1 Capsule by mouth every day.  Dose: 20 mg              Allergies  Allergies   Allergen Reactions    Amoxicillin Hives and Rash     Rxn - 2016    Hydrocodone Unspecified     Hyperactiveness      Propylthiouracil Unspecified     Other reaction(s): difficulty tolerating       DIET  Orders Placed This Encounter   Procedures    Diet Order Diet: Regular     Standing Status:   Standing     Number of Occurrences:   1     Diet::   Regular [1]       ACTIVITY  As tolerated.    CONSULTATIONS  General Surgery    PROCEDURES  Laparoscopic cholecystectomy    LABORATORY  Lab Results   Component Value Date    SODIUM 136 04/28/2025    POTASSIUM 4.5 04/28/2025    CHLORIDE 102 04/28/2025    CO2 22 04/28/2025    GLUCOSE 113 (H) 04/28/2025    BUN 7 (L) 04/28/2025    CREATININE 0.78 04/28/2025        Lab Results   Component Value Date    WBC 15.4 (H) 04/28/2025    HEMOGLOBIN 13.9 04/28/2025    HEMATOCRIT 42.8 04/28/2025    PLATELETCT 256 04/28/2025        Total time of the discharge process exceeds 36 minutes.

## 2025-04-28 NOTE — CARE PLAN
The patient is Stable - Low risk of patient condition declining or worsening    Shift Goals  Clinical Goals: Patient pain will be managed at a tolerable level of 3/10 or less throughout night, Patient will remain free from fall or injury, Monitor lap sites  Patient Goals: Rest comfortably, pain management  Family Goals: n/a    Progress made toward(s) clinical / shift goals:  Patient's pain maintained at a tolerable level of 3/10 or less throughout shift with medication per MAR and rest. Pt did not sustain a fall or injury during shift. Able to rest comfortably throughout shift.     Patient is not progressing towards the following goals:

## 2025-04-28 NOTE — PROGRESS NOTES
Assumed care of pt at 0715. Received bedside report from Blank CRANE. Pt resting in bed. Pain is tolerable at this time. Dressings to abdomen are dry and intact with old drainage. Pt stated she has passed a little gas but has not had a BM yet. Discussed plan to monitor pain and vitals. No other needs at this time, call light in reach, bed in lowest position.

## 2025-04-28 NOTE — PROGRESS NOTES
Received report from day shift RN. Assumed patient care at 1900. Patient is A&O4, on 3L NC at 96%, no SOB noted. No signs of distress or discomfort. Patient complains of 8/10 pain in abdomen, medicated per MAR. Plan of care for the night discussed with patient. Patient verbalized understanding. Patient resting in bed comfortably. Safety precautions in place. All patient belongings and call light within reach. All needs addressed at this time. Patient will call with any needs.

## 2025-04-28 NOTE — PROGRESS NOTES
Discussed discharge instructions with patient. Pt verbalized understanding. Changed pt umbilical dressing as it was saturated per Dr. Pablo. IV removed, paperwork signed. Pt brought down to emergency exit via wheelchair with transport.

## 2025-04-28 NOTE — DISCHARGE INSTRUCTIONS
Please follow-up with general surgery within 1 to 2 weeks    Please follow-up with your primary care physician within 1 to 2 days

## 2025-04-28 NOTE — PROGRESS NOTES
4 Eyes Skin Assessment Completed by ROSA MARIA Parson and ROSA MARIA Melgar.    Head WDL  Ears WDL  Nose WDL  Mouth WDL  Neck WDL  Breast/Chest Redness under breasts  Shoulder Blades WDL  Spine WDL  (R) Arm/Elbow/Hand WDL  (L) Arm/Elbow/Hand WDL  Abdomen Scar, Lap sites with dermabond  Groin WDL  Scrotum/Coccyx/Buttocks WDL  (R) Leg WDL  (L) Leg WDL  (R) Heel/Foot/Toe Dry and Flaky, Cracked Heel, Scab to toe  (L) Heel/Foot/Toe Dry and Flaky, Cracked Heel          Devices In Places Pulse Ox and Nasal Cannula      Interventions In Place NC W/Ear Foams, Pillows, and Pressure Redistribution Mattress    Possible Skin Injury No    Pictures Uploaded Into Epic N/A  Wound Consult Placed N/A  RN Wound Prevention Protocol Ordered Yes

## 2025-04-28 NOTE — DIETARY
NUTRITION SERVICES: BMI - Pt with BMI >40 (=Body mass index is 46.69 kg/m².), class III obesity. Weight loss counseling not appropriate in acute care setting.     RECOMMEND - If appropriate at DC please refer to outpatient nutrition services for weight management.

## 2025-05-07 LAB — PATHOLOGY CONSULT NOTE: NORMAL

## 2025-05-22 ENCOUNTER — OFFICE VISIT (OUTPATIENT)
Dept: MEDICAL GROUP | Facility: LAB | Age: 30
End: 2025-05-22
Payer: COMMERCIAL

## 2025-05-22 VITALS
BODY MASS INDEX: 43.4 KG/M2 | DIASTOLIC BLOOD PRESSURE: 84 MMHG | SYSTOLIC BLOOD PRESSURE: 120 MMHG | OXYGEN SATURATION: 94 % | HEIGHT: 69 IN | HEART RATE: 114 BPM | WEIGHT: 293 LBS | TEMPERATURE: 99.2 F | RESPIRATION RATE: 20 BRPM

## 2025-05-22 DIAGNOSIS — J02.8 PHARYNGITIS DUE TO OTHER ORGANISM: Primary | ICD-10-CM

## 2025-05-22 LAB
FLUAV RNA SPEC QL NAA+PROBE: NEGATIVE
FLUBV RNA SPEC QL NAA+PROBE: POSITIVE
RSV RNA SPEC QL NAA+PROBE: NEGATIVE
S PYO DNA SPEC NAA+PROBE: NOT DETECTED
SARS-COV-2 RNA RESP QL NAA+PROBE: NEGATIVE

## 2025-05-22 PROCEDURE — 87651 STREP A DNA AMP PROBE: CPT | Performed by: FAMILY MEDICINE

## 2025-05-22 PROCEDURE — 0241U POCT CEPHEID COV-2, FLU A/B, RSV - PCR: CPT | Performed by: FAMILY MEDICINE

## 2025-05-22 PROCEDURE — 3074F SYST BP LT 130 MM HG: CPT | Performed by: FAMILY MEDICINE

## 2025-05-22 PROCEDURE — 99213 OFFICE O/P EST LOW 20 MIN: CPT | Performed by: FAMILY MEDICINE

## 2025-05-22 PROCEDURE — 3079F DIAST BP 80-89 MM HG: CPT | Performed by: FAMILY MEDICINE

## 2025-05-22 RX ORDER — OSELTAMIVIR PHOSPHATE 75 MG/1
75 CAPSULE ORAL 2 TIMES DAILY
Qty: 10 CAPSULE | Refills: 0 | Status: SHIPPED | OUTPATIENT
Start: 2025-05-22

## 2025-05-22 ASSESSMENT — FIBROSIS 4 INDEX: FIB4 SCORE: 0.69

## 2025-05-22 NOTE — PROGRESS NOTES
"Subjective:     Chief Complaint   Patient presents with    Fever    Sore Throat    Body Aches         HPI:   Damari presents today for sore throat, body aches, fever, congestion, productive cough.  This started about 2 days ago.  Her daughter was diagnosed with strep throat and then her mother tested positive yesterday for influenza B.  Monday she was feeling quite well and went to the gym and then by Tuesday all of the symptoms are present.  She denies any nausea vomiting or GI upset.  No diarrhea.    Current Medications and Prescriptions Ordered in Epic[1]      ROS:    GI: no nausea/vomiting, no diarrhea  : no dysuria  MSk: no myalgias  Skin: no rash  Neuro: no headaches, no numbness/tingling  Heme/Lymph: no easy bruising      Objective:     Exam:  /84   Pulse (!) 114   Temp 37.3 °C (99.2 °F)   Resp 20   Ht 1.753 m (5' 9\")   Wt (!) 140 kg (308 lb)   SpO2 94%   BMI 45.48 kg/m²  Body mass index is 45.48 kg/m².    Gen: Alert and oriented, No apparent distress.  HEENT: NCAT, EOMI, TMs are both retracted with mild erythema.  Oropharynx is erythematous.  No exudates noted.  Neck: Neck is supple with tender anterior cervical chain  lymphadenopathy.  Lungs: Normal effort, CTA bilaterally, no wheezes, rhonchi, or rales  CV: Regular rate and rhythm. No murmurs, rubs, or gallops.  Ext: No clubbing, cyanosis, edema.      Assessment & Plan:     29 y.o. female with the following -     1. Pharyngitis due to other organism (Primary)  discussed possibility for strep or viral illness.  We did discuss possible use of Tamiflu if influenza is positive and an antibiotic if the strep is positive.  Discussed supportive care with over-the-counter medications as well.  Try to stick to Tylenol for aches and pains with any viral illness.  Let her know results when they return.  - POCT CoV-2, Flu A/B, RSV by PCR  - POCT GROUP A STREP, PCR            No follow-ups on file.    Please note that this dictation was created using " voice recognition software. I have made every reasonable attempt to correct obvious errors, but I expect that there are errors of grammar and possibly content that I did not discover before finalizing the note.             [1]   Current Outpatient Medications Ordered in Epic   Medication Sig Dispense Refill    omeprazole (PRILOSEC) 20 MG delayed-release capsule Take 1 Capsule by mouth every day. 60 Capsule 1    loratadine (CLARITIN) 10 MG Tab Take 10 mg by mouth every morning.      ASHWAGANDHA PO Take 1 Tablet by mouth every morning.      Multiple Vitamin (MULTIVITAMIN PO) Take 1 Tablet by mouth every morning.      ibuprofen (MOTRIN) 200 MG Tab Take 400 mg by mouth every 6 hours as needed for Mild Pain. Indications: Pain      levonorgestrel (MIRENA, 52 MG,) 20 MCG/DAY IUD 1 Each by Intrauterine route see administration instructions. Change every 5 years, last changed 6 years (9/4/2024)       No current [facility]-ordered facility-administered medications on file.

## (undated) DEVICE — TOWELS CLOTH SURGICAL - (4/PK 20PK/CA)

## (undated) DEVICE — GOWN WARMING STANDARD FLEX - (30/CA)

## (undated) DEVICE — SUTURE 4-0 MONOCRYL PLUS PS-2 - 27 INCH (36/BX)

## (undated) DEVICE — CHLORAPREP 26 ML APPLICATOR - ORANGE TINT(25/CA)

## (undated) DEVICE — WATER IRRIGATION STERILE 1000ML (12EA/CA)

## (undated) DEVICE — SYRINGE 30 ML LS (56/BX)

## (undated) DEVICE — GLOVE BIOGEL SZ 8 SURGICAL PF LTX - (50PR/BX 4BX/CA)

## (undated) DEVICE — PACK DAVINCI GENERAL (3EA/CA)

## (undated) DEVICE — SEAL UNIVERSAL 5MM-12MM (10EA/BX)

## (undated) DEVICE — DERMABOND ADVANCED - (12EA/BX)

## (undated) DEVICE — CLIP HEMOLOCK PURPLE - (14/BX)

## (undated) DEVICE — OBTURATOR BLADELESS STANDARD 8MM (6EA/BX)

## (undated) DEVICE — SODIUM CHL IRRIGATION 0.9% 1000ML (12EA/CA)

## (undated) DEVICE — SUTURE GENERAL

## (undated) DEVICE — DRAPE ARM BOX OF 20

## (undated) DEVICE — BAG RETRIEVAL 5MM (10EA/BX)

## (undated) DEVICE — ELECTRODE DUAL RETURN W/ CORD - (50/PK)

## (undated) DEVICE — DRAPE COLUMN BOX OF 20

## (undated) DEVICE — SET LEADWIRE 5 LEAD BEDSIDE DISPOSABLE ECG (1SET OF 5/EA)

## (undated) DEVICE — SLEEVE, VASO, THIGH, MED

## (undated) DEVICE — COVER TIP ENDOWRIST HOT SHEAR - (10EA/BX) DA VINCI

## (undated) DEVICE — ANTI-FOG SOLUTION - 60BTL/CA

## (undated) DEVICE — SET SUCTION/IRRIGATION WITH DISPOSABLE TIP (6/CA )PART #0250-070-520 IS A SUB